# Patient Record
Sex: MALE | Race: WHITE | NOT HISPANIC OR LATINO | URBAN - METROPOLITAN AREA
[De-identification: names, ages, dates, MRNs, and addresses within clinical notes are randomized per-mention and may not be internally consistent; named-entity substitution may affect disease eponyms.]

---

## 2017-08-26 ENCOUNTER — EMERGENCY (EMERGENCY)
Facility: HOSPITAL | Age: 82
LOS: 1 days | Discharge: HOME | End: 2017-08-26
Attending: STUDENT IN AN ORGANIZED HEALTH CARE EDUCATION/TRAINING PROGRAM
Payer: MEDICARE

## 2017-08-26 VITALS
HEIGHT: 72 IN | DIASTOLIC BLOOD PRESSURE: 72 MMHG | WEIGHT: 199.96 LBS | SYSTOLIC BLOOD PRESSURE: 127 MMHG | HEART RATE: 84 BPM | TEMPERATURE: 98 F | OXYGEN SATURATION: 100 % | RESPIRATION RATE: 18 BRPM

## 2017-08-26 VITALS
OXYGEN SATURATION: 100 % | SYSTOLIC BLOOD PRESSURE: 140 MMHG | HEART RATE: 82 BPM | RESPIRATION RATE: 18 BRPM | TEMPERATURE: 98 F | DIASTOLIC BLOOD PRESSURE: 68 MMHG

## 2017-08-26 DIAGNOSIS — Z95.1 PRESENCE OF AORTOCORONARY BYPASS GRAFT: Chronic | ICD-10-CM

## 2017-08-26 PROCEDURE — 99284 EMERGENCY DEPT VISIT MOD MDM: CPT

## 2017-08-26 PROCEDURE — 96374 THER/PROPH/DIAG INJ IV PUSH: CPT

## 2017-08-26 PROCEDURE — 99284 EMERGENCY DEPT VISIT MOD MDM: CPT | Mod: 25

## 2017-08-26 RX ORDER — FAMOTIDINE 10 MG/ML
1 INJECTION INTRAVENOUS
Qty: 5 | Refills: 0 | OUTPATIENT
Start: 2017-08-26 | End: 2017-08-31

## 2017-08-26 RX ORDER — EPINEPHRINE 0.3 MG/.3ML
0.3 INJECTION INTRAMUSCULAR; SUBCUTANEOUS
Qty: 1 | Refills: 0 | OUTPATIENT
Start: 2017-08-26

## 2017-08-26 RX ORDER — DIPHENHYDRAMINE HCL 50 MG
1 CAPSULE ORAL
Qty: 16 | Refills: 0 | OUTPATIENT
Start: 2017-08-26 | End: 2017-08-30

## 2017-08-26 RX ADMIN — Medication 125 MILLIGRAM(S): at 20:43

## 2017-08-26 NOTE — ED ADULT TRIAGE NOTE - CHIEF COMPLAINT QUOTE
Stung by bee 3x to wrist and arm, pt hypotensive upon EMS arrival, pt took own benadryl 180mg which was , and was given Benadryl 50mg IV, 18g present to left AC. Received 700ml NS bolus PTA

## 2017-08-26 NOTE — ED PROVIDER NOTE - SKIN, MLM
Swelling with surround erythema to left forearm in three locations consistent with sting. no signs of infection. small area of swelling with surround erythema to left forearm in three locations consistent with sting. no signs of infection.

## 2017-08-26 NOTE — ED PROVIDER NOTE - OBJECTIVE STATEMENT
This is an 81 y/o M presenting to the ED complaining of bee sting to left forearm x 3 episodes today at 1630. He states that he was out in the yard when he disturbed a wasp nest. Pt states that he then ran into the house, put baking soda on the wound, wife gave fexofenadine and half an oxycodone. Patient reports pain was slightly relieved with oxycodone. Patient then got up to walk and then started to feel dizzy.  Since sting, pt reports swelling and pain to left forearm. EMS was then called and wife reports pt was spitting up sputum.  PCP: Dr. Melchor in [place] This is an 81 y/o M presenting to the ED complaining of sting to left forearm x 3 stings today at 1630. He states that he was out in the yard when he disturbed a wasp nest. Pt states that he then ran into the house, put baking soda on the wound, wife gave fexofenadine and half an oxycodone. Patient reports pain was slightly relieved with oxycodone. Patient then got up to walk and then started to feel dizzy. Since sting, pt reports swelling and pain to left forearm. EMS was then called and wife reports pt was spitting up sputum. At present, patient reports slight pain to LUE. Denies difficulty breathing, scratchy throat, and rash.  PCP: Dr. Melchor in [place]

## 2017-08-26 NOTE — ED ADULT NURSE NOTE - CHPI ED SYMPTOMS NEG
no nausea/no cough/no throat itching/no wheezing/no difficulty swallowing/no shortness of breath/no swelling of face, tongue/no vomiting/no difficulty breathing

## 2017-08-26 NOTE — ED ADULT NURSE NOTE - OBJECTIVE STATEMENT
Pt care assumed at 1855, presents to ED awake, alert and oriented x3 c/o allergic reaction. Pt reports he was working outside at his home and got stung by 3 wasps to his bilateral arms. Pt with redness and mild swelling to bilateral arms, warm to touch. Pt denies any throat itching or swelling. Pt denies any chest pain, sob or difficulty breathing. Pt denies any other complaints at this time, respirations even and unlabored. MD Zhong at bedside. Will continue to observe for any changes in symptoms.

## 2017-08-26 NOTE — ED PROVIDER NOTE - CHPI ED SYMPTOMS NEG
no wheezing/no difficulty swallowing/no nausea no difficulty swallowing/no difficulty breathing/no wheezing/no nausea/no rash/no throat itching

## 2017-08-26 NOTE — ED ADULT NURSE REASSESSMENT NOTE - NS ED NURSE REASSESS COMMENT FT1
Patient resting in bed, awake, alert and oriented X3, resp even/unlabored, lungs CTAB, VS stable, afebrile, denies any SOB, reports soreness to sting sites, will continue to monitor patient.

## 2018-07-29 ENCOUNTER — INPATIENT (INPATIENT)
Facility: HOSPITAL | Age: 83
LOS: 4 days | Discharge: NURSING, REHAB OR LONG-TERM CARE | DRG: 378 | End: 2018-08-03
Attending: INTERNAL MEDICINE | Admitting: GENERAL ACUTE CARE HOSPITAL
Payer: MEDICARE

## 2018-07-29 VITALS
HEART RATE: 84 BPM | WEIGHT: 154.98 LBS | RESPIRATION RATE: 18 BRPM | TEMPERATURE: 96 F | DIASTOLIC BLOOD PRESSURE: 54 MMHG | HEIGHT: 68 IN | SYSTOLIC BLOOD PRESSURE: 108 MMHG | OXYGEN SATURATION: 97 %

## 2018-07-29 DIAGNOSIS — K92.2 GASTROINTESTINAL HEMORRHAGE, UNSPECIFIED: ICD-10-CM

## 2018-07-29 DIAGNOSIS — Z95.1 PRESENCE OF AORTOCORONARY BYPASS GRAFT: Chronic | ICD-10-CM

## 2018-07-29 LAB
ALBUMIN SERPL ELPH-MCNC: 3.4 G/DL — SIGNIFICANT CHANGE UP (ref 3.3–5.2)
ALP SERPL-CCNC: 83 U/L — SIGNIFICANT CHANGE UP (ref 40–120)
ALT FLD-CCNC: 25 U/L — SIGNIFICANT CHANGE UP
ANION GAP SERPL CALC-SCNC: 15 MMOL/L — SIGNIFICANT CHANGE UP (ref 5–17)
ANISOCYTOSIS BLD QL: SLIGHT — SIGNIFICANT CHANGE UP
APTT BLD: 36.7 SEC — SIGNIFICANT CHANGE UP (ref 27.5–37.4)
AST SERPL-CCNC: 39 U/L — SIGNIFICANT CHANGE UP
BASOPHILS # BLD AUTO: 0 K/UL — SIGNIFICANT CHANGE UP (ref 0–0.2)
BASOPHILS # BLD AUTO: 0 K/UL — SIGNIFICANT CHANGE UP (ref 0–0.2)
BASOPHILS NFR BLD AUTO: 0 % — SIGNIFICANT CHANGE UP (ref 0–2)
BASOPHILS NFR BLD AUTO: 0.1 % — SIGNIFICANT CHANGE UP (ref 0–2)
BILIRUB SERPL-MCNC: 1.2 MG/DL — SIGNIFICANT CHANGE UP (ref 0.4–2)
BLD GP AB SCN SERPL QL: SIGNIFICANT CHANGE UP
BUN SERPL-MCNC: 61 MG/DL — HIGH (ref 8–20)
CALCIUM SERPL-MCNC: 8.8 MG/DL — SIGNIFICANT CHANGE UP (ref 8.6–10.2)
CHLORIDE SERPL-SCNC: 93 MMOL/L — LOW (ref 98–107)
CO2 SERPL-SCNC: 21 MMOL/L — LOW (ref 22–29)
CREAT SERPL-MCNC: 1.52 MG/DL — HIGH (ref 0.5–1.3)
EOSINOPHIL # BLD AUTO: 0 K/UL — SIGNIFICANT CHANGE UP (ref 0–0.5)
EOSINOPHIL # BLD AUTO: 0 K/UL — SIGNIFICANT CHANGE UP (ref 0–0.5)
EOSINOPHIL NFR BLD AUTO: 0 % — SIGNIFICANT CHANGE UP (ref 0–5)
EOSINOPHIL NFR BLD AUTO: 0 % — SIGNIFICANT CHANGE UP (ref 0–6)
GLUCOSE SERPL-MCNC: 138 MG/DL — HIGH (ref 70–115)
HCT VFR BLD CALC: 27.2 % — LOW (ref 42–52)
HCT VFR BLD CALC: 27.5 % — LOW (ref 42–52)
HCT VFR BLD CALC: 27.7 % — LOW (ref 42–52)
HCT VFR BLD CALC: 30.9 % — LOW (ref 42–52)
HGB BLD-MCNC: 10.4 G/DL — LOW (ref 14–18)
HGB BLD-MCNC: 9 G/DL — LOW (ref 14–18)
HGB BLD-MCNC: 9.3 G/DL — LOW (ref 14–18)
HGB BLD-MCNC: 9.4 G/DL — LOW (ref 14–18)
INR BLD: 3.28 RATIO — HIGH (ref 0.88–1.16)
LYMPHOCYTES # BLD AUTO: 0.9 K/UL — LOW (ref 1–4.8)
LYMPHOCYTES # BLD AUTO: 1 K/UL — SIGNIFICANT CHANGE UP (ref 1–4.8)
LYMPHOCYTES # BLD AUTO: 5 % — LOW (ref 20–55)
LYMPHOCYTES # BLD AUTO: 8.2 % — LOW (ref 20–55)
MACROCYTES BLD QL: SLIGHT — SIGNIFICANT CHANGE UP
MCHC RBC-ENTMCNC: 27.4 PG — SIGNIFICANT CHANGE UP (ref 27–31)
MCHC RBC-ENTMCNC: 28.1 PG — SIGNIFICANT CHANGE UP (ref 27–31)
MCHC RBC-ENTMCNC: 28.3 PG — SIGNIFICANT CHANGE UP (ref 27–31)
MCHC RBC-ENTMCNC: 33.1 G/DL — SIGNIFICANT CHANGE UP (ref 32–36)
MCHC RBC-ENTMCNC: 33.7 G/DL — SIGNIFICANT CHANGE UP (ref 32–36)
MCHC RBC-ENTMCNC: 33.9 G/DL — SIGNIFICANT CHANGE UP (ref 32–36)
MCV RBC AUTO: 82.9 FL — SIGNIFICANT CHANGE UP (ref 80–94)
MCV RBC AUTO: 83.4 FL — SIGNIFICANT CHANGE UP (ref 80–94)
MCV RBC AUTO: 83.5 FL — SIGNIFICANT CHANGE UP (ref 80–94)
MICROCYTES BLD QL: SLIGHT — SIGNIFICANT CHANGE UP
MONOCYTES # BLD AUTO: 0.9 K/UL — HIGH (ref 0–0.8)
MONOCYTES # BLD AUTO: 1.2 K/UL — HIGH (ref 0–0.8)
MONOCYTES NFR BLD AUTO: 6 % — SIGNIFICANT CHANGE UP (ref 3–10)
MONOCYTES NFR BLD AUTO: 7.5 % — SIGNIFICANT CHANGE UP (ref 3–10)
NEUTROPHILS # BLD AUTO: 10.5 K/UL — HIGH (ref 1.8–8)
NEUTROPHILS # BLD AUTO: 11.8 K/UL — HIGH (ref 1.8–8)
NEUTROPHILS NFR BLD AUTO: 83.9 % — HIGH (ref 37–73)
NEUTROPHILS NFR BLD AUTO: 89 % — HIGH (ref 37–73)
OB PNL STL: POSITIVE
OVALOCYTES BLD QL SMEAR: SLIGHT — SIGNIFICANT CHANGE UP
PLAT MORPH BLD: ABNORMAL
PLATELET # BLD AUTO: 204 K/UL — SIGNIFICANT CHANGE UP (ref 150–400)
PLATELET # BLD AUTO: 217 K/UL — SIGNIFICANT CHANGE UP (ref 150–400)
PLATELET # BLD AUTO: 232 K/UL — SIGNIFICANT CHANGE UP (ref 150–400)
PLATELET CLUMP BLD QL SMEAR: SIGNIFICANT CHANGE UP
POIKILOCYTOSIS BLD QL AUTO: SLIGHT — SIGNIFICANT CHANGE UP
POTASSIUM SERPL-MCNC: 5 MMOL/L — SIGNIFICANT CHANGE UP (ref 3.5–5.3)
POTASSIUM SERPL-SCNC: 5 MMOL/L — SIGNIFICANT CHANGE UP (ref 3.5–5.3)
PROT SERPL-MCNC: 7.3 G/DL — SIGNIFICANT CHANGE UP (ref 6.6–8.7)
PROTHROM AB SERPL-ACNC: 37 SEC — HIGH (ref 9.8–12.7)
RBC # BLD: 3.28 M/UL — LOW (ref 4.6–6.2)
RBC # BLD: 3.32 M/UL — LOW (ref 4.6–6.2)
RBC # BLD: 3.7 M/UL — LOW (ref 4.6–6.2)
RBC # FLD: 18.1 % — HIGH (ref 11–15.6)
RBC # FLD: 18.2 % — HIGH (ref 11–15.6)
RBC # FLD: 18.4 % — HIGH (ref 11–15.6)
RBC BLD AUTO: ABNORMAL
SCHISTOCYTES BLD QL AUTO: SLIGHT — SIGNIFICANT CHANGE UP
SODIUM SERPL-SCNC: 129 MMOL/L — LOW (ref 135–145)
TYPE + AB SCN PNL BLD: SIGNIFICANT CHANGE UP
WBC # BLD: 12.6 K/UL — HIGH (ref 4.8–10.8)
WBC # BLD: 13.3 K/UL — HIGH (ref 4.8–10.8)
WBC # BLD: 14 K/UL — HIGH (ref 4.8–10.8)
WBC # FLD AUTO: 12.6 K/UL — HIGH (ref 4.8–10.8)
WBC # FLD AUTO: 13.3 K/UL — HIGH (ref 4.8–10.8)
WBC # FLD AUTO: 14 K/UL — HIGH (ref 4.8–10.8)

## 2018-07-29 PROCEDURE — 99223 1ST HOSP IP/OBS HIGH 75: CPT

## 2018-07-29 PROCEDURE — 93010 ELECTROCARDIOGRAM REPORT: CPT

## 2018-07-29 PROCEDURE — 99285 EMERGENCY DEPT VISIT HI MDM: CPT

## 2018-07-29 PROCEDURE — 70450 CT HEAD/BRAIN W/O DYE: CPT | Mod: 26

## 2018-07-29 PROCEDURE — 74174 CTA ABD&PLVS W/CONTRAST: CPT | Mod: 26

## 2018-07-29 RX ORDER — INSULIN LISPRO 100/ML
VIAL (ML) SUBCUTANEOUS
Qty: 0 | Refills: 0 | Status: DISCONTINUED | OUTPATIENT
Start: 2018-07-29 | End: 2018-08-03

## 2018-07-29 RX ORDER — DEXTROSE 50 % IN WATER 50 %
25 SYRINGE (ML) INTRAVENOUS ONCE
Qty: 0 | Refills: 0 | Status: DISCONTINUED | OUTPATIENT
Start: 2018-07-29 | End: 2018-08-03

## 2018-07-29 RX ORDER — LISINOPRIL 2.5 MG/1
2.5 TABLET ORAL DAILY
Qty: 0 | Refills: 0 | Status: DISCONTINUED | OUTPATIENT
Start: 2018-07-29 | End: 2018-07-29

## 2018-07-29 RX ORDER — SODIUM CHLORIDE 9 MG/ML
1000 INJECTION INTRAMUSCULAR; INTRAVENOUS; SUBCUTANEOUS
Qty: 0 | Refills: 0 | Status: DISCONTINUED | OUTPATIENT
Start: 2018-07-29 | End: 2018-07-31

## 2018-07-29 RX ORDER — DEXTROSE 50 % IN WATER 50 %
15 SYRINGE (ML) INTRAVENOUS ONCE
Qty: 0 | Refills: 0 | Status: DISCONTINUED | OUTPATIENT
Start: 2018-07-29 | End: 2018-08-03

## 2018-07-29 RX ORDER — METOPROLOL TARTRATE 50 MG
25 TABLET ORAL
Qty: 0 | Refills: 0 | Status: DISCONTINUED | OUTPATIENT
Start: 2018-07-29 | End: 2018-08-03

## 2018-07-29 RX ORDER — SODIUM CHLORIDE 9 MG/ML
1000 INJECTION, SOLUTION INTRAVENOUS
Qty: 0 | Refills: 0 | Status: DISCONTINUED | OUTPATIENT
Start: 2018-07-29 | End: 2018-08-03

## 2018-07-29 RX ORDER — WARFARIN SODIUM 2.5 MG/1
4 TABLET ORAL ONCE
Qty: 0 | Refills: 0 | Status: DISCONTINUED | OUTPATIENT
Start: 2018-07-29 | End: 2018-07-29

## 2018-07-29 RX ORDER — DEXTROSE 50 % IN WATER 50 %
12.5 SYRINGE (ML) INTRAVENOUS ONCE
Qty: 0 | Refills: 0 | Status: DISCONTINUED | OUTPATIENT
Start: 2018-07-29 | End: 2018-08-03

## 2018-07-29 RX ORDER — ALLOPURINOL 300 MG
75 TABLET ORAL DAILY
Qty: 0 | Refills: 0 | Status: DISCONTINUED | OUTPATIENT
Start: 2018-07-29 | End: 2018-07-30

## 2018-07-29 RX ORDER — SODIUM CHLORIDE 9 MG/ML
500 INJECTION, SOLUTION INTRAVENOUS ONCE
Qty: 0 | Refills: 0 | Status: COMPLETED | OUTPATIENT
Start: 2018-07-29 | End: 2018-07-29

## 2018-07-29 RX ORDER — ACETAMINOPHEN 500 MG
650 TABLET ORAL EVERY 6 HOURS
Qty: 0 | Refills: 0 | Status: DISCONTINUED | OUTPATIENT
Start: 2018-07-29 | End: 2018-08-03

## 2018-07-29 RX ORDER — ATORVASTATIN CALCIUM 80 MG/1
40 TABLET, FILM COATED ORAL AT BEDTIME
Qty: 0 | Refills: 0 | Status: DISCONTINUED | OUTPATIENT
Start: 2018-07-29 | End: 2018-08-03

## 2018-07-29 RX ORDER — GLUCAGON INJECTION, SOLUTION 0.5 MG/.1ML
1 INJECTION, SOLUTION SUBCUTANEOUS ONCE
Qty: 0 | Refills: 0 | Status: DISCONTINUED | OUTPATIENT
Start: 2018-07-29 | End: 2018-08-03

## 2018-07-29 RX ORDER — PANTOPRAZOLE SODIUM 20 MG/1
40 TABLET, DELAYED RELEASE ORAL
Qty: 0 | Refills: 0 | Status: DISCONTINUED | OUTPATIENT
Start: 2018-07-29 | End: 2018-08-02

## 2018-07-29 RX ADMIN — SODIUM CHLORIDE 500 MILLILITER(S): 9 INJECTION, SOLUTION INTRAVENOUS at 13:43

## 2018-07-29 RX ADMIN — PANTOPRAZOLE SODIUM 40 MILLIGRAM(S): 20 TABLET, DELAYED RELEASE ORAL at 21:26

## 2018-07-29 RX ADMIN — SODIUM CHLORIDE 75 MILLILITER(S): 9 INJECTION INTRAMUSCULAR; INTRAVENOUS; SUBCUTANEOUS at 21:27

## 2018-07-29 RX ADMIN — Medication 75 MILLIGRAM(S): at 21:26

## 2018-07-29 RX ADMIN — ATORVASTATIN CALCIUM 40 MILLIGRAM(S): 80 TABLET, FILM COATED ORAL at 21:26

## 2018-07-29 RX ADMIN — SODIUM CHLORIDE 1500 MILLILITER(S): 9 INJECTION, SOLUTION INTRAVENOUS at 12:57

## 2018-07-29 RX ADMIN — Medication 25 MILLIGRAM(S): at 21:26

## 2018-07-29 NOTE — ED PROVIDER NOTE - GASTROINTESTINAL, MLM
Abdomen soft, non-tender, no guarding.  Rectal:  External hemorrhoid, nonbleeding.  Brown stool with bloody streaking

## 2018-07-29 NOTE — ED PROVIDER NOTE - OBJECTIVE STATEMENT
82 y/o M, with hx of DM, mitral valve replacement, packemaker, and CABG, presents to the ED c/o rectal bleeding, onset this morning.  As per wife, pt has been passing large amounts of blood from his rectum.  Wife notes bright red blood, with some small clots, but states that pt's stool is dark brown, not black.  Wife also states that pt has had diarrhea for the past 6 weeks.  Pt denies a colonoscopy 2 weeks ago.  Pt currently takes 4mg of Warfarin.  Pt recently stopped Warfarin 5 days ago due to complications and started again yesterday on a lower dose.  Denies hx of GI bleed.  Wife also states that pt appears to be losing weight.  Denies hx of abd surgery.  Wife also states that pt recently had a CT and was diagnosed with mild emphysema and a small node on his right lung.  Wife also notes recent urinary incontinence.  Cardiologist: Dr. Ewing.  Denies f/c/n/v/cp/sob/palpitations/ cough/rash/headache/dizziness/abd.pain/c/dysuria/hematuria 82 y/o M, with hx of DM, mitral valve replacement, packemaker, and CABG, presents to the ED c/o rectal bleeding, onset this morning.  As per wife, pt has been passing large amounts of blood from his rectum.  Wife notes bright red blood, with some small clots, but states that pt's stool is dark brown, not black.  Wife also states that pt has had diarrhea for the past 6 weeks.  Pt declined a colonoscopy 2 weeks ago.  Pt currently takes 4mg of Warfarin.  Pt recently stopped Warfarin 5 days ago due to complications INR 6 at that time and started again yesterday on a lower dose of 4mg.  Denies hx of GI bleed.  Wife also states that pt appears to be losing weight and getting more weak.  Denies hx of abd surgery.  Wife also states that pt recently had a CT and was diagnosed with mild emphysema and a small node on his right lung.  Wife also notes recent urinary incontinence.  Cardiologist: Dr. Ewing.  Denies f/c/n/v/cp/sob/palpitations/ cough/rash/headache/dizziness/abd.pain/c/dysuria/hematuria

## 2018-07-29 NOTE — ED PROVIDER NOTE - NS ED ROS FT
Denies f/c/n/v/cp/sob/palpitations/ cough/rash/headache/dizziness/abd.pain/c/dysuria/hematuria   +GENERALIZED WEAKNESS, RECTAL BLEEDING, DIARRHEA/ WEIGHT LOSS Denies f/c/n/v/cp/sob/palpitations/ cough/rash/headache/dizziness/abd.pain/c/dysuria/hematuria   +GENERALIZED WEAKNESS, RECTAL BLEEDING, DIARRHEA/ WEIGHT LOSS/URINARY INCONTINENCE

## 2018-07-29 NOTE — CONSULT NOTE ADULT - ATTENDING COMMENTS
as above    # No VTE on therapeutic coumadin    # social work re discharge planning as wife cannot manage aone    # will need PT evaluation once GIB stable as above    # No VTE on therapeutic coumadin    # social work re discharge planning as wife cannot manage aone    # will need PT evaluation once GIB stable    # med rec and inpatient certification done as above    # No VTE on therapeutic coumadin    # social work re discharge planning as wife cannot manage alone    # will need PT evaluation once GIB stable    # med rec and inpatient certification done

## 2018-07-29 NOTE — H&P ADULT - ASSESSMENT
Patient is a 83 yr old male with PMH of CAD s/p CABG, DM II, s/p MVR on coumadin, and s/p PPM who presented to [facility] ED with complaints of bright red blood per rectum since yesterday evening.    1. Lower GI bleed  -admit to monitored bed with   -patient with 6 week history of diarrhea and now with BRBPR  -Hb 10.4 --> 9.0  -Occult blood positive   -CTA negative   -Protonix IV BID  -Hold Coumadin   -Clear liquid diet  -No active bleeding or abdominal pain in the ED; patient currently hemodynamically stable  -Type and Screen & Serial H/H  -Judicious hydration  -GI consulted    2. 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 MVR  -on Coumadin but therapeutic INR  -hold Coumadin and check INR in AM    4. DM  -check HbA1c  -hold oral agents  -ISS    5. CAD s/p CABG  -asymptomatic  -check TNI and EKG  -not on ASA or plavix  -continue metoprolol and statin    6. Leukocytosis  -likely reactive  -monitor temp curve and WBC count  -check UA, CXR  -blood cultures if febrile    7. Hyponatremia  -likely secondary to poor solute intake and hypovolemia  -check serum osm, urine osm and lytes  -check orthostatics  -trial if NS  -strict I/Os, daily weights  -repeat BMP in AM    8. Forgetfulness  -likely secondary to dementia  -needs formal cognitive function testing as outpatient  -check B12, TSH, RPR  -CT head - negative for acute pathology    DVT prophylaxis - SCDs Patient is a 83 yr old male with PMH of CAD s/p CABG, DM II, s/p MVR on coumadin, and s/p PPM who presented to [facility] ED with complaints of bright red blood per rectum since yesterday evening.    1. Lower GI bleed  -admit to monitored bed with   -patient with 6 week history of diarrhea and now with BRBPR  -Hb 10.4 --> 9.0  -Occult blood positive   -CTA negative   -Protonix IV BID  -Hold Coumadin   -Clear liquid diet  -No active bleeding or abdominal pain in the ED; patient currently hemodynamically stable  -Type and Screen & Serial H/H  -Judicious hydration  -GI consulted    2. 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 MVR  -on Coumadin but therapeutic INR  -hold Coumadin and check INR in AM    4. DM  -check HbA1c  -hold oral agents  -ISS    5. CAD s/p CABG  -asymptomatic  -check TNI and EKG  -not on ASA or plavix  -continue metoprolol and statin    6. Leukocytosis  -likely reactive  -monitor temp curve and WBC count  -check UA, CXR  -blood cultures if febrile    7. Hyponatremia  -likely secondary to poor solute intake and hypovolemia  -check serum osm, urine osm and lytes  -check orthostatics  -trial if NS  -strict I/Os, daily weights  -repeat BMP in AM    8. Forgetfulness  -likely secondary to dementia  -needs formal cognitive function testing as outpatient  -check B12, TSH, RPR  -CT head - negative for acute pathology    9. Chronic pain syndrome  -continue lyrica  -eventual PT evaluation    DVT prophylaxis - SCDs

## 2018-07-29 NOTE — CONSULT NOTE ADULT - SUBJECTIVE AND OBJECTIVE BOX
Wife contributed more to the story than pt    83 yr old male w CAD s/p CABG, DM II, Diarrhea,  s/p MVR on coumadin, PPM who presented to [facility] ED w bright red blood per rectum and recent fall       Wife indicated that pt had 6 weeks of diarrhea that was worked up w CTA as outpt; no reason seen for diarrhea; refused colonoscopy;  last study >10 yrs ago  Suddenly last night and this AM, pt experienced nausea and had multiple BMs w stool and sometimes just blood.  She reported that blood was bright.  Patient had N, no vomiting.  Used 5 rolls of toilet paper trying to clean up from passing the blood.  No real clots seen.  She insisted on the ED  Last had INR 07-24 INR 8;  help coumadin x 3 days then decreased dose from 5 mg to 4 mg as instructed Weakness to the point she cannot get him up when he falls. Memory is "getting forgetful".     In ED, stool brown w guaiac +.  Hb 10.5 then 9 then CTA done; no source for bleeding   Last fell on o7-27 w/o LOC so ED MD ordered head CT- results pending    PCP Dr. Santino Ewing  GI Dr. Mohamud?         PAST MED HX  Anaphylaxis to wasps  CAD s/p CABG   DM II  Diarrhea x 6 weeks CTA outpt neg  Hyperlipidemia HLD  Lung nodule (3 cm seen on CT) w mild COPD  Mental confusion  s/p MVR on coumadin  s/p PPM    PAST SURG HX  CABG 1996  PPM 10 yrs ago  MVR 5 yrs ago    FAM HX  Mother w DM  no hx CAD, HTN, ulcers, colon CA    SOCIAL HX    lives w wife  nonsmoker      ROS  GEN no fever or chills; no travel hx or sick contacts   + intermittent dizziness and weakness  + 8 lb weight loss  HEENT + hit his head w/o LOC on 07-27  RESP no cough or SOB  CARD no palpitations, no chest pain  GI + nausea no vomiting, no abd pain + diarrhea x 6 weeks ,   now blood per rectum; had refused routine colonoscopy and more recent request for study 2 weeks ago  MSK some intermittent joint pain  NEURO  developing more forgetfulness    PHYSiCAL        Tele-evaluation precludes physical exam. Pertinent physical exam findings  Pt appear frail, appears pale, comfortable on stretcher wearing NC/supplemental oxygen, in NAD  Vital Signs Last 24 Hrs  T(C): 36.4 (29 Jul 2018 15:59), Max: 36.7 (29 Jul 2018 11:13)  T(F): 97.5 (29 Jul 2018 15:59), Max: 98 (29 Jul 2018 11:13)  HR: 65 (29 Jul 2018 15:59) (65 - 88)  BP: 110/63 (29 Jul 2018 15:59) (108/54 - 110/63)  BP(mean): --  RR: 18 (29 Jul 2018 15:59) (18 - 18)  SpO2: 94% (29 Jul 2018 15:59) (94% - 98%)        LABS AND IMAGING DATA:                        9.0    12.6  )-----------( 204      ( 29 Jul 2018 14:28 )     down from 10.5             27.2     07-29    129<L>  |  93<L>  |  61.0<H>  ----------------------------<  138<H>  5.0   |  21.0<L>  |  1.52<H>    Ca    8.8      29 Jul 2018 09:37    TPro  7.3  /  Alb  3.4  /  TBili  1.2  /  DBili  x   /  AST  39  /  ALT  25  /  AlkPhos  83  07-       CT ANGIO ABD PELV (W)AW IC                        PROCEDURE DATE:  07/29/2018    INTERPRETATION:  CLINICAL INFORMATION: Bright red blood per rectum.  FINDINGS:    LOWER CHEST: Cardiac valve repair. Partially imaged pacing wires.    LIVER: Within normal limits.  BILE DUCTS: Normal caliber.   GALLBLADDER: Within normal limits.  SPLEEN: Within normal limits.  PANCREAS: Within normal limits.  ADRENALS: Within normal limits.  KIDNEYS/URETERS: Within normal limits.     BLADDER: Within normal limits.  REPRODUCTIVE ORGANS: The prostate is enlarged.    BOWEL: No bowel obstruction. Normal appendix. No active contrast   extravasation to suggest a source of active GI bleed.   PERITONEUM: No ascites.  VESSELS:  Atherosclerotic change of the abdominal aorta and its branches.  RETROPERITONEUM: No lymphadenopathy.    ABDOMINAL WALL: Within normal limits.  BONES: Degenerative changes of the spine. Sternotomy.    IMPRESSION: No active contrast extravasation to suggest a source of   active GI bleed. Wife contributed more to the story than pt    83 yr old male w CAD s/p CABG, DM II, Diarrhea,  s/p MVR on coumadin, PPM who presented to [facility] ED w bright red blood per rectum and recent fall       Wife indicated that pt had 6 weeks of diarrhea that was worked up w CTscan as outpt; no reason seen for diarrhea; refused colonoscopy;  last study >10 yrs ago  Suddenly last night and this AM, pt experienced nausea and had multiple BMs w stool and sometimes just blood.  She reported that blood was bright.  Patient had N, no vomiting.  Used 5 rolls of toilet paper trying to clean up from passing the blood.  No real clots seen.  She insisted on the ED  Last had INR 07-24 INR 8;  help coumadin x 3 days then decreased dose from 5 mg to 4 mg as instructed Weakness to the point she cannot get him up when he falls. Memory is "getting forgetful".     In ED, stool brown w guaiac +.  Hb 10.5 then 9 then CTA done; no source for bleeding   Last fell on o7-27 w/o LOC so ED MD ordered head CT- results pending    PCP Dr. Santino Ewing  GI Dr. Mohamud?         PAST MED HX  Anaphylaxis to wasps  CAD s/p CABG   DM II  Diarrhea x 6 weeks CTA outpt neg  Hyperlipidemia HLD  Lung nodule (3 cm seen on CT) w mild COPD  Mental confusion  s/p MVR on coumadin  s/p PPM    PAST SURG HX  CABG 1996  PPM 10 yrs ago  MVR 5 yrs ago    FAM HX  Mother w DM  no hx CAD, HTN, ulcers, colon CA    SOCIAL HX    lives w wife  nonsmoker      ROS  GEN no fever or chills; no travel hx or sick contacts   + intermittent dizziness and weakness  + 8 lb weight loss  HEENT + hit his head w/o LOC on 07-27  RESP no cough or SOB  CARD no palpitations, no chest pain  GI + nausea no vomiting, no abd pain + diarrhea x 6 weeks ,   now blood per rectum; had refused routine colonoscopy and more recent request for study 2 weeks ago  MSK some intermittent joint pain  NEURO  developing more forgetfulness    PHYSiCAL        Tele-evaluation precludes physical exam. Pertinent physical exam findings  Pt appear frail, appears pale, comfortable on stretcher wearing NC/supplemental oxygen, in NAD  Vital Signs Last 24 Hrs  T(C): 36.4 (29 Jul 2018 15:59), Max: 36.7 (29 Jul 2018 11:13)  T(F): 97.5 (29 Jul 2018 15:59), Max: 98 (29 Jul 2018 11:13)  HR: 65 (29 Jul 2018 15:59) (65 - 88)  BP: 110/63 (29 Jul 2018 15:59) (108/54 - 110/63)  BP(mean): --  RR: 18 (29 Jul 2018 15:59) (18 - 18)  SpO2: 94% (29 Jul 2018 15:59) (94% - 98%)        LABS AND IMAGING DATA:                        9.0    12.6  )-----------( 204      ( 29 Jul 2018 14:28 )     down from 10.5             27.2     07-29    129<L>  |  93<L>  |  61.0<H>  ----------------------------<  138<H>  5.0   |  21.0<L>  |  1.52<H>    Ca    8.8      29 Jul 2018 09:37    TPro  7.3  /  Alb  3.4  /  TBili  1.2  /  DBili  x   /  AST  39  /  ALT  25  /  AlkPhos  83  07-       CT ANGIO ABD PELV (W)AW IC                        PROCEDURE DATE:  07/29/2018    INTERPRETATION:  CLINICAL INFORMATION: Bright red blood per rectum.  FINDINGS:    LOWER CHEST: Cardiac valve repair. Partially imaged pacing wires.    LIVER: Within normal limits.  BILE DUCTS: Normal caliber.   GALLBLADDER: Within normal limits.  SPLEEN: Within normal limits.  PANCREAS: Within normal limits.  ADRENALS: Within normal limits.  KIDNEYS/URETERS: Within normal limits.     BLADDER: Within normal limits.  REPRODUCTIVE ORGANS: The prostate is enlarged.    BOWEL: No bowel obstruction. Normal appendix. No active contrast   extravasation to suggest a source of active GI bleed.   PERITONEUM: No ascites.  VESSELS:  Atherosclerotic change of the abdominal aorta and its branches.  RETROPERITONEUM: No lymphadenopathy.    ABDOMINAL WALL: Within normal limits.  BONES: Degenerative changes of the spine. Sternotomy.    IMPRESSION: No active contrast extravasation to suggest a source of   active GI bleed.      HEAD CT NON-CONTRAST FINDINGS:     There is no evidence of mass or acute intracranial hemorrhage. Chronic   left frontal lobe infarct with associated ex vacuo dilatation of the   frontal horn of the left lateral ventricle. Ventricles and sulci are   otherwise normal in size and configuration for the patient's stated age.   No midline shift or other significant mass effect is noted. There is no   CT evidence of acute territorial infarct.     The visualized paranasal sinuses and tympanomastoid spaces are clear.   Orbits and orbital contents are unremarkable.    There is no depressed calvarial fracture.    IMPRESSION:     No evidence of acute intracranial hemorrhage, midline shift or CT   evidence of acute territorial infarct.    If the patient's symptoms persist, consider short interval follow-up head   CT or brain MRI if there are no MRI contraindications.

## 2018-07-29 NOTE — ED ADULT TRIAGE NOTE - CHIEF COMPLAINT QUOTE
Patient is awake ad oriented times 4, complains of feeling like he has to have a bowel movement and not passing much stool, patient has had some stool and some bright red blood, patient reports the urgency to have a bm in triage

## 2018-07-29 NOTE — CONSULT NOTE ADULT - ASSESSMENT
83 yr old male w CAD s/p CABG, DM II, Diarrhea,  s/p MVR on coumadin, PPM who presented to [facility] ED w bright red blood per rectum and recent fall    1) GI bleed  -reported as bright red blood + recent prolonged diarrhea  -progressive weakness and weight loss  -CT angio no significant findings/bleeding site  no pain  no added BPR in ED  1. admit to telemetry  2. CBC tonight and in AM  3. clears  4. protonix 40 mg IV q 12  5. to consider GI consult  6. bedrest  7. continue pulse ox  8. continue supplemental O2    2) recent fall s/o injury to head  CT pending       3)s/p MVR on coumadin  mechanical valve reported  1. continue coumadin 4 mg  2. repeat PT/INR in AM  3. Monitor for rectal bleeding and or abd or chest pain  4. telemetry  5. EKG    4) CAD s/p CABG, PPM  1. continue home meds  metoprolol  pravastatin 40 mg  lisinopril 2.5  2. check ekg    5) Chronic pain  1. continue lyrica    6) Gout  continue allopurinol 75 mg 83 yr old male w CAD s/p CABG, DM II, Diarrhea,  s/p MVR on coumadin, PPM who presented to [facility] ED w bright red blood per rectum and recent fall    1) GI bleed  -reported as bright red blood + recent prolonged diarrhea  -progressive weakness and weight loss  -CT angio no significant findings/bleeding site  no pain  no added BPR in ED  1. admit to telemetry  2. CBC tonight and in AM  3. clears  4. protonix 40 mg IV q 12  5. to consider GI consult  6. bedrest  7. continue pulse ox  8. continue supplemental O2    2) recent fall s/o injury to head  CT pending       3)s/p MVR on coumadin  mechanical valve reported  1. continue coumadin 4 mg  2. repeat PT/INR in AM  3. Monitor for rectal bleeding and or abd or chest pain  4. telemetry  5. EKG    4) CAD s/p CABG, PPM  1. continue home meds  metoprolol  pravastatin 40 mg  lisinopril 2.5  2. check ekg    5) Chronic pain  1. continue lyrica    6) DM II  1. hold metformin  2. clear po  3. BGM  4. ISS only for now    7) Gout  continue allopurinol 75 mg 83 yr old male w CAD s/p CABG, DM II, Diarrhea,  s/p MVR on coumadin, PPM who presented to [facility] ED w bright red blood per rectum and recent fall    1) GI bleed  -reported as bright red blood + recent prolonged diarrhea  -progressive weakness and weight loss  -CT angio no significant findings/bleeding site  no pain  no added BPR in ED  1. admit to telemetry  2. CBC tonight and in AM  3. clears  4. protonix 40 mg IV q 12  5. to consider GI consult  6. bedrest  7. continue pulse ox  8. continue supplemental O2    2) recent fall s/o injury to head  CT no acute change       3)s/p MVR on coumadin  mechanical valve reported  1. continue coumadin 4 mg  2. repeat PT/INR in AM  3. Monitor for rectal bleeding and or abd or chest pain  4. telemetry  5. EKG    4) CAD s/p CABG, PPM  1. continue home meds  metoprolol  pravastatin 40 mg  lisinopril 2.5  2. check ekg    5) Chronic pain  1. continue lyrica    6) DM II  1. hold metformin  2. clear po  3. BGM  4. ISS only for now    7) Gout  continue allopurinol 75 mg

## 2018-07-29 NOTE — H&P ADULT - HISTORY OF PRESENT ILLNESS
Patient is a 83 yr old male with PMH of CAD s/p CABG, DM II, s/p MVR on coumadin, and s/p PPM who presented to [facility] ED with complaints of bright red blood per rectum since yesterday evening. Patient is a poor historian and likely has dementia, therefore most information was obtained from the patient's wife. Patient recently has been having ongoing diarrhea (non-bloody) for approximately 6 weeks for which he had an outpatient CT scan that did not reveal any specific etiology of the diarrhea. Patient had refused an outpatient colonoscopy (last study > 10 years ago). As per patient's wife, patient had multiple episodes of bloody bowel movements (formed) and nausea yesterday evening and this morning. Patient described the blood as bright red. Denies associated vomiting or abdominal pain. Recently, patient also sustained a fall and hit the left side of his head. Patient denied any prodromal symptoms or LOC at that time. On further evaluation, patient recently had his Coumadin dose decreased after having a supratherapeutic INR. Specifically, his last INR on 7/24 was 8 and he was instructed to hold his Coumadin for 3 days. In the ED, patient had a positive occult blood. Initial Hb was 10.5 and repeat was 9. Vitals remained stable with no evidence of bleeding in the ED. CTA was done which did not reveal a source of bleeding.  Patient is hemodynamically stable and currently denies chest pain, SOB, nausea, vomiting, abdominal pain, dizziness or lightheadedness.

## 2018-07-29 NOTE — INPATIENT CERTIFICATION FOR MEDICARE PATIENTS - RISKS OF ADVERSE EVENTS
Concern for neurologic deterioration/Concern for renal deterioration/Other:/Concern for delay in diagnosis and treatment/risk of hemorrhaging/Concern for cardiopulmonary deterioration Other:/Concern for cardiopulmonary deterioration/Concern for neurologic deterioration/bleeding/Concern for delay in diagnosis and treatment/Concern for renal deterioration

## 2018-07-30 DIAGNOSIS — K92.2 GASTROINTESTINAL HEMORRHAGE, UNSPECIFIED: ICD-10-CM

## 2018-07-30 LAB
ANION GAP SERPL CALC-SCNC: 11 MMOL/L — SIGNIFICANT CHANGE UP (ref 5–17)
APPEARANCE UR: CLEAR — SIGNIFICANT CHANGE UP
BASOPHILS # BLD AUTO: 0 K/UL — SIGNIFICANT CHANGE UP (ref 0–0.2)
BASOPHILS NFR BLD AUTO: 0.1 % — SIGNIFICANT CHANGE UP (ref 0–2)
BILIRUB UR-MCNC: NEGATIVE — SIGNIFICANT CHANGE UP
BUN SERPL-MCNC: 40 MG/DL — HIGH (ref 8–20)
CALCIUM SERPL-MCNC: 8.3 MG/DL — LOW (ref 8.6–10.2)
CHLORIDE SERPL-SCNC: 99 MMOL/L — SIGNIFICANT CHANGE UP (ref 98–107)
CO2 SERPL-SCNC: 23 MMOL/L — SIGNIFICANT CHANGE UP (ref 22–29)
COLOR SPEC: YELLOW — SIGNIFICANT CHANGE UP
CORTIS AM PEAK SERPL-MCNC: 16.6 UG/DL — SIGNIFICANT CHANGE UP (ref 6–18.4)
CREAT ?TM UR-MCNC: 77 MG/DL — SIGNIFICANT CHANGE UP
CREAT SERPL-MCNC: 1.19 MG/DL — SIGNIFICANT CHANGE UP (ref 0.5–1.3)
DIFF PNL FLD: ABNORMAL
EOSINOPHIL # BLD AUTO: 0 K/UL — SIGNIFICANT CHANGE UP (ref 0–0.5)
EOSINOPHIL NFR BLD AUTO: 0.1 % — SIGNIFICANT CHANGE UP (ref 0–5)
EPI CELLS # UR: SIGNIFICANT CHANGE UP
GLUCOSE BLDC GLUCOMTR-MCNC: 132 MG/DL — HIGH (ref 70–99)
GLUCOSE BLDC GLUCOMTR-MCNC: 135 MG/DL — HIGH (ref 70–99)
GLUCOSE BLDC GLUCOMTR-MCNC: 328 MG/DL — HIGH (ref 70–99)
GLUCOSE SERPL-MCNC: 129 MG/DL — HIGH (ref 70–115)
GLUCOSE UR QL: NEGATIVE MG/DL — SIGNIFICANT CHANGE UP
HBA1C BLD-MCNC: 6.2 % — HIGH (ref 4–5.6)
HCT VFR BLD CALC: 26.3 % — LOW (ref 42–52)
HCT VFR BLD CALC: 27 % — LOW (ref 42–52)
HCT VFR BLD CALC: 29.4 % — LOW (ref 42–52)
HGB BLD-MCNC: 8.8 G/DL — LOW (ref 14–18)
HGB BLD-MCNC: 9 G/DL — LOW (ref 14–18)
HGB BLD-MCNC: 9.7 G/DL — LOW (ref 14–18)
INR BLD: 3.72 RATIO — HIGH (ref 0.88–1.16)
KETONES UR-MCNC: NEGATIVE — SIGNIFICANT CHANGE UP
LACTATE SERPL-SCNC: 0.8 MMOL/L — SIGNIFICANT CHANGE UP (ref 0.5–2)
LEUKOCYTE ESTERASE UR-ACNC: NEGATIVE — SIGNIFICANT CHANGE UP
LYMPHOCYTES # BLD AUTO: 0.9 K/UL — LOW (ref 1–4.8)
LYMPHOCYTES # BLD AUTO: 8 % — LOW (ref 20–55)
MAGNESIUM SERPL-MCNC: 2 MG/DL — SIGNIFICANT CHANGE UP (ref 1.6–2.6)
MCHC RBC-ENTMCNC: 27.9 PG — SIGNIFICANT CHANGE UP (ref 27–31)
MCHC RBC-ENTMCNC: 33.3 G/DL — SIGNIFICANT CHANGE UP (ref 32–36)
MCV RBC AUTO: 83.6 FL — SIGNIFICANT CHANGE UP (ref 80–94)
MONOCYTES # BLD AUTO: 0.7 K/UL — SIGNIFICANT CHANGE UP (ref 0–0.8)
MONOCYTES NFR BLD AUTO: 6.2 % — SIGNIFICANT CHANGE UP (ref 3–10)
NEUTROPHILS # BLD AUTO: 9.9 K/UL — HIGH (ref 1.8–8)
NEUTROPHILS NFR BLD AUTO: 85.3 % — HIGH (ref 37–73)
NITRITE UR-MCNC: NEGATIVE — SIGNIFICANT CHANGE UP
OSMOLALITY SERPL: 310 MOSM/KG — HIGH (ref 280–300)
OSMOLALITY UR: 581 MOSM/KG — SIGNIFICANT CHANGE UP (ref 300–1000)
PH UR: 5 — SIGNIFICANT CHANGE UP (ref 5–8)
PHOSPHATE SERPL-MCNC: 4 MG/DL — SIGNIFICANT CHANGE UP (ref 2.4–4.7)
PLATELET # BLD AUTO: 209 K/UL — SIGNIFICANT CHANGE UP (ref 150–400)
POTASSIUM SERPL-MCNC: 4.5 MMOL/L — SIGNIFICANT CHANGE UP (ref 3.5–5.3)
POTASSIUM SERPL-SCNC: 4.5 MMOL/L — SIGNIFICANT CHANGE UP (ref 3.5–5.3)
PROT UR-MCNC: 30 MG/DL
PROTHROM AB SERPL-ACNC: 42 SEC — HIGH (ref 9.8–12.7)
RBC # BLD: 3.23 M/UL — LOW (ref 4.6–6.2)
RBC # FLD: 18.6 % — HIGH (ref 11–15.6)
RBC CASTS # UR COMP ASSIST: ABNORMAL /HPF (ref 0–4)
SODIUM SERPL-SCNC: 133 MMOL/L — LOW (ref 135–145)
SODIUM UR-SCNC: 32 MMOL/L — SIGNIFICANT CHANGE UP
SP GR SPEC: 1.01 — SIGNIFICANT CHANGE UP (ref 1.01–1.02)
T3 SERPL-MCNC: 63 NG/DL — LOW (ref 80–200)
T4 AB SER-ACNC: 4.7 UG/DL — SIGNIFICANT CHANGE UP (ref 4.5–12)
TROPONIN T SERPL-MCNC: 0.09 NG/ML — HIGH (ref 0–0.06)
TROPONIN T SERPL-MCNC: 0.1 NG/ML — HIGH (ref 0–0.06)
TSH SERPL-MCNC: 4.18 UIU/ML — SIGNIFICANT CHANGE UP (ref 0.27–4.2)
URATE SERPL-MCNC: 5.8 MG/DL — SIGNIFICANT CHANGE UP (ref 3.4–7)
UROBILINOGEN FLD QL: NEGATIVE MG/DL — SIGNIFICANT CHANGE UP
VIT B12 SERPL-MCNC: 585 PG/ML — SIGNIFICANT CHANGE UP (ref 232–1245)
WBC # BLD: 11.6 K/UL — HIGH (ref 4.8–10.8)
WBC # FLD AUTO: 11.6 K/UL — HIGH (ref 4.8–10.8)
WBC UR QL: NEGATIVE — SIGNIFICANT CHANGE UP

## 2018-07-30 PROCEDURE — 93010 ELECTROCARDIOGRAM REPORT: CPT

## 2018-07-30 PROCEDURE — 71045 X-RAY EXAM CHEST 1 VIEW: CPT | Mod: 26

## 2018-07-30 PROCEDURE — 99233 SBSQ HOSP IP/OBS HIGH 50: CPT

## 2018-07-30 PROCEDURE — 99223 1ST HOSP IP/OBS HIGH 75: CPT

## 2018-07-30 RX ORDER — ALLOPURINOL 300 MG
75 TABLET ORAL
Qty: 0 | Refills: 0 | COMMUNITY

## 2018-07-30 RX ORDER — ALLOPURINOL 300 MG
150 TABLET ORAL
Qty: 0 | Refills: 0 | COMMUNITY

## 2018-07-30 RX ORDER — ALLOPURINOL 300 MG
150 TABLET ORAL DAILY
Qty: 0 | Refills: 0 | Status: DISCONTINUED | OUTPATIENT
Start: 2018-07-30 | End: 2018-08-03

## 2018-07-30 RX ORDER — OXYCODONE AND ACETAMINOPHEN 5; 325 MG/1; MG/1
1 TABLET ORAL EVERY 6 HOURS
Qty: 0 | Refills: 0 | Status: DISCONTINUED | OUTPATIENT
Start: 2018-07-30 | End: 2018-08-03

## 2018-07-30 RX ADMIN — Medication 4: at 18:42

## 2018-07-30 RX ADMIN — PANTOPRAZOLE SODIUM 40 MILLIGRAM(S): 20 TABLET, DELAYED RELEASE ORAL at 05:27

## 2018-07-30 RX ADMIN — ATORVASTATIN CALCIUM 40 MILLIGRAM(S): 80 TABLET, FILM COATED ORAL at 21:30

## 2018-07-30 RX ADMIN — OXYCODONE AND ACETAMINOPHEN 1 TABLET(S): 5; 325 TABLET ORAL at 20:04

## 2018-07-30 RX ADMIN — Medication 75 MILLIGRAM(S): at 18:29

## 2018-07-30 RX ADMIN — OXYCODONE AND ACETAMINOPHEN 1 TABLET(S): 5; 325 TABLET ORAL at 21:00

## 2018-07-30 RX ADMIN — Medication 150 MILLIGRAM(S): at 18:30

## 2018-07-30 RX ADMIN — OXYCODONE AND ACETAMINOPHEN 1 TABLET(S): 5; 325 TABLET ORAL at 14:03

## 2018-07-30 RX ADMIN — PANTOPRAZOLE SODIUM 40 MILLIGRAM(S): 20 TABLET, DELAYED RELEASE ORAL at 18:30

## 2018-07-30 RX ADMIN — SODIUM CHLORIDE 75 MILLILITER(S): 9 INJECTION INTRAMUSCULAR; INTRAVENOUS; SUBCUTANEOUS at 05:27

## 2018-07-30 RX ADMIN — Medication 75 MILLIGRAM(S): at 21:30

## 2018-07-30 RX ADMIN — Medication 75 MILLIGRAM(S): at 05:27

## 2018-07-30 RX ADMIN — Medication 650 MILLIGRAM(S): at 01:56

## 2018-07-30 RX ADMIN — Medication 25 MILLIGRAM(S): at 18:29

## 2018-07-30 NOTE — ED ADULT NURSE REASSESSMENT NOTE - COMFORT CARE
repositioned/plan of care explained/warm blanket provided/side rails up/treatment delay explained/wait time explained

## 2018-07-30 NOTE — CONSULT NOTE ADULT - PROBLEM SELECTOR RECOMMENDATION 9
Patient with rectal bleeding. Scant blood on rectal this am  loose yellow stool  check stool studies  clear liquid diet  Bleeding maybe hemorrrhoidal, mild colitis, mass,   H+H fairly stable. Please keep INR in low therapeutic range.   As per chart, pt refused colonoscopy recently. MAy consider virtual colon if stool studies negative and if pt is agreeable ( coumadin would not need to be held)

## 2018-07-30 NOTE — CONSULT NOTE ADULT - SUBJECTIVE AND OBJECTIVE BOX
Patient is a 83y old  Male who presents with a chief complaint of BRBPR (29 Jul 2018 20:26)      HPI:  Patient is a 83 yr old male with PMH of CAD s/p CABG, DM II, s/p MVR on coumadin, and s/p PPM ,?COPD    Poor historian corwin monk presented to [facility] ED with complaints of bright red blood per rectum since yesterday .   Patient knows is here for rectal bleeding, but cannot recall the details. As per Er chart, pt had   at least 6 bloody BM's with significant blood. diarrhea for 6 weeks. Patient is denying diarrhea to me. AS per chart he was offered colonoscopy , but refused. Has not had colonscopy for many  years.   Had supratherapeutic INR last week ( INR of 8 as per primary ). Coumadin was held a few days. Wife told primary  that out patient CT was negative for source of diarrhea. Unknown if he had stool studies as outpatient. Pt denies fever, or abdominal pain. No nausea or vomiting. No CP, no SOB. I  call and left message with wife to call me back.     REVIEW OF SYSTEMS:  Constitutional: No fever, weight loss or fatigue  ENMT:  No difficulty hearing, tinnitus, vertigo; No sinus or throat pain  Respiratory: No cough, wheezing, chills or hemoptysis  Cardiovascular: No chest pain, palpitations, dizziness or leg swelling  Gastrointestinal: No abdominal or epigastric pain. No nausea, vomiting or hematemesis; No diarrhea or constipation. + hematochezia.- possibly diarrhea  Skin: No itching, burning, rashes or lesions   Musculoskeletal: No joint pain or swelling; No muscle, back or extremity pain    PAST MEDICAL & SURGICAL HISTORY:  Chronic pain  Insomnia  Diabetes  Mitral valve replaced  Pacemaker  S/P CABG (coronary artery bypass graft)      FAMILY HISTORY:  No pertinent family history in first degree relatives      SOCIAL HISTORY:  Smoking Status: [ ] Current, [ ] Former, [ ] Never  Pack Years:  [  ] EtOH-no  [  ] IVDA-no    MEDICATIONS:  MEDICATIONS  (STANDING):  allopurinol 75 milliGRAM(s) Oral daily  atorvastatin 40 milliGRAM(s) Oral at bedtime  dextrose 5%. 1000 milliLiter(s) (50 mL/Hr) IV Continuous <Continuous>  dextrose 50% Injectable 12.5 Gram(s) IV Push once  dextrose 50% Injectable 25 Gram(s) IV Push once  dextrose 50% Injectable 25 Gram(s) IV Push once  insulin lispro (HumaLOG) corrective regimen sliding scale   SubCutaneous three times a day before meals  metoprolol tartrate 25 milliGRAM(s) Oral two times a day  pantoprazole  Injectable 40 milliGRAM(s) IV Push two times a day  pregabalin 75 milliGRAM(s) Oral three times a day  sodium chloride 0.9%. 1000 milliLiter(s) (75 mL/Hr) IV Continuous <Continuous>    MEDICATIONS  (PRN):  acetaminophen   Tablet 650 milliGRAM(s) Oral every 6 hours PRN For Temp greater than 38 C (100.4 F)  dextrose 40% Gel 15 Gram(s) Oral once PRN Blood Glucose LESS THAN 70 milliGRAM(s)/deciliter  glucagon  Injectable 1 milliGRAM(s) IntraMuscular once PRN Glucose LESS THAN 70 milligrams/deciliter      Allergies    No Known Allergies    Intolerances        Vital Signs Last 24 Hrs  T(C): 37.1 (30 Jul 2018 04:07), Max: 38.9 (30 Jul 2018 01:59)  T(F): 98.7 (30 Jul 2018 04:07), Max: 102 (30 Jul 2018 01:59)  HR: 64 (30 Jul 2018 05:25) (64 - 88)  BP: 93/58 (30 Jul 2018 05:25) (93/58 - 112/62)  BP(mean): --  RR: 20 (30 Jul 2018 05:25) (18 - 20)  SpO2: 94% (30 Jul 2018 05:25) (92% - 98%)        PHYSICAL EXAM:    General: Well developed; well nourished; in no acute distress  HEENT: MMM, conjunctiva and sclera clear  H- RRR  L- CTA  Gastrointestinal: Soft, non-tender non-distended; Normal bowel sounds; No rebound or guarding  Extremities: Normal range of motion, No clubbing, cyanosis or edema  Neurological: Alert and oriented x3  Skin: Warm and dry. No obvious rash  rectal- yellow stool ,  blood tinged. + internal hemorrhoids      LABS:                        9.3    13.3  )-----------( 217      ( 29 Jul 2018 21:29 )             27.5     30 Jul 2018 06:25    133    |  99     |  40.0   ----------------------------<  129    4.5     |  23.0   |  1.19     Ca    8.3        30 Jul 2018 06:25  Phos  4.0       30 Jul 2018 06:25  Mg     2.0       30 Jul 2018 06:25    TPro  7.3    /  Alb  3.4    /  TBili  1.2    /  DBili  x      /  AST  39     /  ALT  25     /  AlkPhos  83     / Amylase x      /Lipase x      29 Jul 2018 09:37              RADIOLOGY & ADDITIONAL STUDIES:     < from: CT Angio Abdomen and Pelvis w/ IV Cont (07.29.18 @ 11:57) >  IMPRESSION: No active contrast extravasation to suggest a source of   active GI bleed.    < end of copied text >

## 2018-07-30 NOTE — CONSULT NOTE ADULT - SUBJECTIVE AND OBJECTIVE BOX
[facility]                                          [address], [address], [place] [address]                                               PHONE: [phone]    FAX: [phone] [address], [address], [place] [address]                                       PHONE: [phone]    FAX: [phone]  *******************************************************************************    Reason for Consult: Mechanical MVR; GIB    HPI:  DAVID LUIS is a 83y man with significant history of HTN, DMII, HLD, CAD s/p remote CABG, mechanical MVR (2012), SSS s/p PPM (2009), and moderate b/l carotid disease, who presents to the ER for evaluation of bright red blood per rectum. The patient states that he was experiencing bloody bowel movements for the last several days but states that this appears to have stopped at this time.  The patient states that he was told last week that his INR was >8.  He held coumadin for 2 days and subsequent INR was 3.7 and coumadin was restarted at lower dose of 4 mg daily.  He is accompanied by his wife and daughter who state that the patient has been falling frequently and has been increasingly weak.  The patient denies any chest pain, palpitations, shortness of breath or difficulty breathing.  No syncope or near syncope.      PAST MEDICAL & SURGICAL HISTORY:  Chronic pain  Insomnia  Diabetes  Mitral valve replaced  Pacemaker  S/P CABG (coronary artery bypass graft)      No Known Allergies      MEDICATIONS  (STANDING):  allopurinol 150 milliGRAM(s) Oral daily  atorvastatin 40 milliGRAM(s) Oral at bedtime  dextrose 5%. 1000 milliLiter(s) (50 mL/Hr) IV Continuous <Continuous>  dextrose 50% Injectable 12.5 Gram(s) IV Push once  dextrose 50% Injectable 25 Gram(s) IV Push once  dextrose 50% Injectable 25 Gram(s) IV Push once  insulin lispro (HumaLOG) corrective regimen sliding scale   SubCutaneous three times a day before meals  metoprolol tartrate 25 milliGRAM(s) Oral two times a day  pantoprazole  Injectable 40 milliGRAM(s) IV Push two times a day  pregabalin 75 milliGRAM(s) Oral three times a day  sodium chloride 0.9%. 1000 milliLiter(s) (75 mL/Hr) IV Continuous <Continuous>    MEDICATIONS  (PRN):  acetaminophen   Tablet 650 milliGRAM(s) Oral every 6 hours PRN For Temp greater than 38 C (100.4 F)  dextrose 40% Gel 15 Gram(s) Oral once PRN Blood Glucose LESS THAN 70 milliGRAM(s)/deciliter  glucagon  Injectable 1 milliGRAM(s) IntraMuscular once PRN Glucose LESS THAN 70 milligrams/deciliter  oxyCODONE    5 mG/acetaminophen 325 mG 1 Tablet(s) Oral every 6 hours PRN Severe Pain (7 - 10)      Social History: no active tobacco / EtOH / IVDA    Family History: No pertinent family history in first degree relatives      ROS: As noted above, otherwise unremarkable.    Vital Signs Last 24 Hrs  T(C): 37 (30 Jul 2018 08:04), Max: 38.9 (30 Jul 2018 01:59)  T(F): 98.6 (30 Jul 2018 08:04), Max: 102 (30 Jul 2018 01:59)  HR: 66 (30 Jul 2018 08:04) (64 - 71)  BP: 101/50 (30 Jul 2018 08:04) (93/58 - 112/62)  BP(mean): --  RR: 18 (30 Jul 2018 08:04) (18 - 20)  SpO2: 96% (30 Jul 2018 08:04) (92% - 96%)    I&O's Detail    I&O's Summary    PHYSICAL EXAM:  General: Frail elderly man, comfortable in bed in nad  HEENT: Head: normocephalic, atraumatic  Eyes: Pupils equal and reactive  Neck: Supple, no carotid bruit, no JVD, no HJR  CARDIOVASCULAR: Normal S1 and S2, no murmur, rub, or gallop  LUNGS: Clear to auscultation bilaterally, no rales, rhonchi or wheeze  ABDOMEN: Soft, nontender, non-distended, positive bowel sounds, no mass or bruit  EXTREMITIES: No edema, distal pulses WNL  SKIN: Warm and dry with normal turgor  NEURO: Alert & oriented x 3, grossly intact  PSYCH: normal mood and affect    LABS:                        9.7    x     )-----------( x        ( 30 Jul 2018 12:39 )             29.4     07-30    133<L>  |  99  |  40.0<H>  ----------------------------<  129<H>  4.5   |  23.0  |  1.19    Ca    8.3<L>      30 Jul 2018 06:25  Phos  4.0     07-30  Mg     2.0     07-30    TPro  7.3  /  Alb  3.4  /  TBili  1.2  /  DBili  x   /  AST  39  /  ALT  25  /  AlkPhos  83  07-29    CARDIAC MARKERS ( 30 Jul 2018 14:59 )  x     / 0.10 ng/mL / x     / x     / x      CARDIAC MARKERS ( 30 Jul 2018 06:25 )  x     / 0.09 ng/mL / x     / x     / x          PT/INR - ( 30 Jul 2018 06:25 )   PT: 42.0 sec;   INR: 3.72 ratio         PTT - ( 29 Jul 2018 09:37 )  PTT:36.7 sec  Thyroid Stimulating Hormone, Serum: 4.18 uIU/mL (07-30 @ 06:25)      RADIOLOGY & ADDITIONAL STUDIES:    ECG: V paced rhythm @70/min; occasional PVC's    ECHO (06/2017): mild concentric LVH; normal LV systolic function E 55-60%; mildly dilated LA, mechanical MVR; mildly elevated PASP.     NUCLEAR STRESS TEST (09/2016): normal rest-stress myocardial perfusion; normal LV function EF 76%.       Assessment and Plan:  In summary,   DAVID LUIS is a 83y man with significant history of HTN, DMII, HLD, CAD s/p remote CABG, mechanical MVR (2012), SSS s/p PPM (2009), and moderate b/l carotid disease, admitted with bright red blood per rectum and weakness.    - Monitor on telemetry  - The patient has been chest pain free since admission.  Mildly elevated flat-trending troponin is nonspecific.  Doubt acute MI.  Continue medical management fo known severe CAD.   - Echocardiogram from 06/2017 reviewed as above.  No need to repeat at this time.    - No evidence of ischemia or CHF clinically, eventual ischemic evaluation (likely as outpatient)  - Rhythm/hemodynamics stable = continue current doses for now and titrate PRN  - The patient is s/p mechanical mitral valve replacement, maintained on coumadin with goal INR of 2.5-3.5.  This should be continued.  GI following regarding BRBPR. Patient initially refused colonoscopy but now appears willing to proceed.  There is no cardiac contraindication to proceeding with colonoscopy.      We will follow with you as needed.  Please do not hesitate to call with any questions or concerns.    Thank you for allowing me to participate in the care of your patient.      Sincerely,    Olegario Alvarado M.D.

## 2018-07-30 NOTE — ED ADULT NURSE REASSESSMENT NOTE - NS ED NURSE REASSESS COMMENT FT1
Pt with large void in diaper, cleaned, linens and gown changed. Scant amount of red blood when buttocks wiped, no signs of active bleeding. Dressing to lower sacrum/coccyx clean, dry and intact. Pt moves well in bed, all leads changed, am care provided, pt made comfortable on stretcher, call bell within reach, fall precautions in place.
Pt's wife at bedside and states pt refused Endoscopy/Colonoscopy this morning when test was offered per wife pt is agreeing to testing now, Dr Curran' office contacted and message left with Olga . Family made aware.
pt a&ox2-3 at times. pt resting in bed, denies any episode of bloody stool at this time. pt c/o 7/10 bilateral leg pain from bed. labs drawn by , updated on plan of care, call bell in reach
Pt resting comfortably on stretcher, resp even and unlabored, color good, incontinence care provided, linens changed, pt with scant amount of bright red blood when wiping rectum, MD aware, no intervention at this time, red blanchable area to coccyx, mepilex placed for precaution, pt medicated as per MD orders regarding fever, awaiting admit bed, will continue to monitor
pt a&ox2-3, states he is feeling much better. no episodes of rectal bleeding at this time. vss. afebrile. denies any pain/discomfort. will monitor
spoke with ahmet in lab regarding 0400 labs, will send tech
Pt received in the stretcher resting comfortably, no distress noted, no chest pain reported, awaiting blood work results, will continue to  monitor

## 2018-07-30 NOTE — PROGRESS NOTE ADULT - ASSESSMENT
Patient is a 83 yr old male with PMH of CAD s/p CABG, DM II, s/p MVR on coumadin, and s/p PPM who presented to [facility] ED with complaints of bright red blood per rectum since yesterday evening.    1. Lower GI bleed  -patient with 6 week history of diarrhea and now with BRBPR  -Hb 10.4 --> 9.0  -Occult blood positive   -CTA negative   -Protonix IV BID  -Hold Coumadin given supra-therapeutic INR  -Clear liquid diet  -Stool studies per GI  -No active bleeding or abdominal pain in the ED; patient currently hemodynamically stable  -Type and Screen & Serial H/H  -Continue Judicious hydration  -GI consult appreciated; possible colonoscopy if patient is agreeable    2. 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 MVR  -on Coumadin but supra-therapeutic INR  -hold Coumadin and check INR in AM    4. DM  -check HbA1c  -hold oral agents  -ISS    5. CAD s/p CABG  -asymptomatic  -TNI elevated, likely demand ischemia in light of GI bleed  -EKG - occasional PVCs  -cardio recommendations appreciated  -not on ASA or plavix  -continue metoprolol and statin    6. Leukocytosis  -likely reactive  -monitor temp curve and WBC count  -check UA negative   -CXR negative  -blood cultures if febrile    7. Hyponatremia - improving  -likely secondary to poor solute intake and hypovolemia  -urine studies reviewed  -check orthostatics  -continuous judicious hydration  -strict I/Os, daily weights  -repeat BMP in AM    8. Forgetfulness  -likely secondary to dementia  -needs formal cognitive function testing as outpatient  -check B12, TSH WNL  -f/u RPR  -CT head - negative for acute pathology    9. Chronic pain syndrome  -continue lyrica  -add percocet PRN  -eventual PT evaluation    DVT prophylaxis - SCDs

## 2018-07-30 NOTE — PROGRESS NOTE ADULT - SUBJECTIVE AND OBJECTIVE BOX
CHIEF COMPLAINT/INTERVAL HISTORY:    Patient is a 83y old  Male who presents with a chief complaint of BRBPR (2018 20:26)    SUBJECTIVE & OBJECTIVE: Pt seen and examined at bedside. No overnight events. No further episodes of bright red per rectum. H/H stable. Seen by GI, stool studies ordered. Cardio consulted given elevated TNI.     ROS: No chest pain, palpitations, SOB, light headedness, dizziness, headache, nausea/vomiting, fevers/chills, abdominal pain, dysuria or increased urinary frequency.    ICU Vital Signs Last 24 Hrs  T(C): 37 (2018 18:18), Max: 38.9 (2018 01:59)  T(F): 98.6 (2018 18:18), Max: 102 (2018 01:59)  HR: 62 (2018 18:18) (62 - 71)  BP: 110/54 (2018 18:18) (93/58 - 112/62)  RR: 18 (2018 18:18) (18 - 20)  SpO2: 96% (2018 18:18) (92% - 96%)    MEDICATIONS  (STANDING):  allopurinol 150 milliGRAM(s) Oral daily  atorvastatin 40 milliGRAM(s) Oral at bedtime  dextrose 5%. 1000 milliLiter(s) (50 mL/Hr) IV Continuous <Continuous>  dextrose 50% Injectable 12.5 Gram(s) IV Push once  dextrose 50% Injectable 25 Gram(s) IV Push once  dextrose 50% Injectable 25 Gram(s) IV Push once  insulin lispro (HumaLOG) corrective regimen sliding scale   SubCutaneous three times a day before meals  metoprolol tartrate 25 milliGRAM(s) Oral two times a day  pantoprazole  Injectable 40 milliGRAM(s) IV Push two times a day  pregabalin 75 milliGRAM(s) Oral three times a day  sodium chloride 0.9%. 1000 milliLiter(s) (75 mL/Hr) IV Continuous <Continuous>    MEDICATIONS  (PRN):  acetaminophen   Tablet 650 milliGRAM(s) Oral every 6 hours PRN For Temp greater than 38 C (100.4 F)  dextrose 40% Gel 15 Gram(s) Oral once PRN Blood Glucose LESS THAN 70 milliGRAM(s)/deciliter  glucagon  Injectable 1 milliGRAM(s) IntraMuscular once PRN Glucose LESS THAN 70 milligrams/deciliter  oxyCODONE    5 mG/acetaminophen 325 mG 1 Tablet(s) Oral every 6 hours PRN Severe Pain (7 - 10)      LABS:                        9.7    x     )-----------( x        ( 2018 12:39 )             29.4     07-30    133<L>  |  99  |  40.0<H>  ----------------------------<  129<H>  4.5   |  23.0  |  1.19    Ca    8.3<L>      2018 06:25  Phos  4.0     07-30  Mg     2.0     07-30    TPro  7.3  /  Alb  3.4  /  TBili  1.2  /  DBili  x   /  AST  39  /  ALT  25  /  AlkPhos  83  07-29    PT/INR - ( 2018 06:25 )   PT: 42.0 sec;   INR: 3.72 ratio         PTT - ( 2018 09:37 )  PTT:36.7 sec  Urinalysis Basic - ( 2018 01:54 )    Color: Yellow / Appearance: Clear / S.010 / pH: x  Gluc: x / Ketone: Negative  / Bili: Negative / Urobili: Negative mg/dL   Blood: x / Protein: 30 mg/dL / Nitrite: Negative   Leuk Esterase: Negative / RBC: 6-10 /HPF / WBC Negative   Sq Epi: x / Non Sq Epi: Occasional / Bacteria: x        CAPILLARY BLOOD GLUCOSE      POCT Blood Glucose.: 328 mg/dL (2018 17:37)  POCT Blood Glucose.: 132 mg/dL (2018 13:13)  POCT Blood Glucose.: 135 mg/dL (2018 08:09)      PHYSICAL EXAM:    GENERAL: elderly male, laying in bed, NAD  HEAD:  Atraumatic, Normocephalic  EYES: EOMI, PERRLA, conjunctiva and sclera clear  ENMT: Moist mucous membranes  NECK: Supple  NERVOUS SYSTEM:  Alert & Oriented X3  CHEST/LUNG: Clear to auscultation bilaterally  HEART: Regular rate and rhythm; +S1/S2  ABDOMEN: Soft, Nontender, Nondistended; Bowel sounds present  EXTREMITIES:  + pedal edema

## 2018-07-31 DIAGNOSIS — R19.7 DIARRHEA, UNSPECIFIED: ICD-10-CM

## 2018-07-31 LAB
ACETONE SERPL-MCNC: NEGATIVE — SIGNIFICANT CHANGE UP
ANION GAP SERPL CALC-SCNC: 14 MMOL/L — SIGNIFICANT CHANGE UP (ref 5–17)
APTT BLD: 45.8 SEC — HIGH (ref 27.5–37.4)
BASOPHILS # BLD AUTO: 0 K/UL — SIGNIFICANT CHANGE UP (ref 0–0.2)
BASOPHILS NFR BLD AUTO: 0.1 % — SIGNIFICANT CHANGE UP (ref 0–2)
BUN SERPL-MCNC: 32 MG/DL — HIGH (ref 8–20)
CALCIUM SERPL-MCNC: 7.9 MG/DL — LOW (ref 8.6–10.2)
CHLORIDE SERPL-SCNC: 98 MMOL/L — SIGNIFICANT CHANGE UP (ref 98–107)
CO2 SERPL-SCNC: 20 MMOL/L — LOW (ref 22–29)
CREAT SERPL-MCNC: 1.27 MG/DL — SIGNIFICANT CHANGE UP (ref 0.5–1.3)
EOSINOPHIL # BLD AUTO: 0 K/UL — SIGNIFICANT CHANGE UP (ref 0–0.5)
EOSINOPHIL NFR BLD AUTO: 0 % — SIGNIFICANT CHANGE UP (ref 0–5)
GLUCOSE BLDC GLUCOMTR-MCNC: 147 MG/DL — HIGH (ref 70–99)
GLUCOSE BLDC GLUCOMTR-MCNC: 171 MG/DL — HIGH (ref 70–99)
GLUCOSE BLDC GLUCOMTR-MCNC: 204 MG/DL — HIGH (ref 70–99)
GLUCOSE SERPL-MCNC: 134 MG/DL — HIGH (ref 70–115)
HCT VFR BLD CALC: 26.6 % — LOW (ref 42–52)
HGB BLD-MCNC: 8.9 G/DL — LOW (ref 14–18)
INR BLD: 6.21 RATIO — CRITICAL HIGH (ref 0.88–1.16)
LYMPHOCYTES # BLD AUTO: 0.6 K/UL — LOW (ref 1–4.8)
LYMPHOCYTES # BLD AUTO: 4.9 % — LOW (ref 20–55)
MAGNESIUM SERPL-MCNC: 1.7 MG/DL — SIGNIFICANT CHANGE UP (ref 1.6–2.6)
MCHC RBC-ENTMCNC: 28 PG — SIGNIFICANT CHANGE UP (ref 27–31)
MCHC RBC-ENTMCNC: 33.5 G/DL — SIGNIFICANT CHANGE UP (ref 32–36)
MCV RBC AUTO: 83.6 FL — SIGNIFICANT CHANGE UP (ref 80–94)
MONOCYTES # BLD AUTO: 1 K/UL — HIGH (ref 0–0.8)
MONOCYTES NFR BLD AUTO: 7.6 % — SIGNIFICANT CHANGE UP (ref 3–10)
NEUTROPHILS # BLD AUTO: 11.7 K/UL — HIGH (ref 1.8–8)
NEUTROPHILS NFR BLD AUTO: 87.1 % — HIGH (ref 37–73)
PHOSPHATE SERPL-MCNC: 3.6 MG/DL — SIGNIFICANT CHANGE UP (ref 2.4–4.7)
PLATELET # BLD AUTO: 192 K/UL — SIGNIFICANT CHANGE UP (ref 150–400)
POTASSIUM SERPL-MCNC: 4.8 MMOL/L — SIGNIFICANT CHANGE UP (ref 3.5–5.3)
POTASSIUM SERPL-SCNC: 4.8 MMOL/L — SIGNIFICANT CHANGE UP (ref 3.5–5.3)
PROCALCITONIN SERPL-MCNC: 1.18 NG/ML — SIGNIFICANT CHANGE UP (ref 0–0.04)
PROTHROM AB SERPL-ACNC: 70.9 SEC — HIGH (ref 9.8–12.7)
RAPID RVP RESULT: SIGNIFICANT CHANGE UP
RBC # BLD: 3.18 M/UL — LOW (ref 4.6–6.2)
RBC # FLD: 18.8 % — HIGH (ref 11–15.6)
SODIUM SERPL-SCNC: 132 MMOL/L — LOW (ref 135–145)
T PALLIDUM AB TITR SER: NEGATIVE — SIGNIFICANT CHANGE UP
TROPONIN T SERPL-MCNC: 0.1 NG/ML — HIGH (ref 0–0.06)
TROPONIN T SERPL-MCNC: 0.16 NG/ML — HIGH (ref 0–0.06)
TROPONIN T SERPL-MCNC: 0.22 NG/ML — HIGH (ref 0–0.06)
WBC # BLD: 13.4 K/UL — HIGH (ref 4.8–10.8)
WBC # FLD AUTO: 13.4 K/UL — HIGH (ref 4.8–10.8)

## 2018-07-31 PROCEDURE — 99233 SBSQ HOSP IP/OBS HIGH 50: CPT

## 2018-07-31 PROCEDURE — 93010 ELECTROCARDIOGRAM REPORT: CPT

## 2018-07-31 RX ORDER — PHYTONADIONE (VIT K1) 5 MG
5 TABLET ORAL ONCE
Qty: 0 | Refills: 0 | Status: COMPLETED | OUTPATIENT
Start: 2018-07-31 | End: 2018-07-31

## 2018-07-31 RX ORDER — MAGNESIUM SULFATE 500 MG/ML
1 VIAL (ML) INJECTION ONCE
Qty: 0 | Refills: 0 | Status: COMPLETED | OUTPATIENT
Start: 2018-07-31 | End: 2018-07-31

## 2018-07-31 RX ADMIN — SODIUM CHLORIDE 75 MILLILITER(S): 9 INJECTION INTRAMUSCULAR; INTRAVENOUS; SUBCUTANEOUS at 05:14

## 2018-07-31 RX ADMIN — Medication 75 MILLIGRAM(S): at 22:49

## 2018-07-31 RX ADMIN — OXYCODONE AND ACETAMINOPHEN 1 TABLET(S): 5; 325 TABLET ORAL at 08:56

## 2018-07-31 RX ADMIN — Medication 50 GRAM(S): at 11:38

## 2018-07-31 RX ADMIN — ATORVASTATIN CALCIUM 40 MILLIGRAM(S): 80 TABLET, FILM COATED ORAL at 22:49

## 2018-07-31 RX ADMIN — Medication 75 MILLIGRAM(S): at 13:43

## 2018-07-31 RX ADMIN — Medication 25 MILLIGRAM(S): at 05:13

## 2018-07-31 RX ADMIN — Medication 25 MILLIGRAM(S): at 22:49

## 2018-07-31 RX ADMIN — Medication 1: at 18:05

## 2018-07-31 RX ADMIN — PANTOPRAZOLE SODIUM 40 MILLIGRAM(S): 20 TABLET, DELAYED RELEASE ORAL at 18:06

## 2018-07-31 RX ADMIN — OXYCODONE AND ACETAMINOPHEN 1 TABLET(S): 5; 325 TABLET ORAL at 07:56

## 2018-07-31 RX ADMIN — Medication 2: at 12:46

## 2018-07-31 RX ADMIN — OXYCODONE AND ACETAMINOPHEN 1 TABLET(S): 5; 325 TABLET ORAL at 16:05

## 2018-07-31 RX ADMIN — Medication 75 MILLIGRAM(S): at 05:13

## 2018-07-31 RX ADMIN — OXYCODONE AND ACETAMINOPHEN 1 TABLET(S): 5; 325 TABLET ORAL at 22:49

## 2018-07-31 RX ADMIN — Medication 101 MILLIGRAM(S): at 09:45

## 2018-07-31 RX ADMIN — PANTOPRAZOLE SODIUM 40 MILLIGRAM(S): 20 TABLET, DELAYED RELEASE ORAL at 05:13

## 2018-07-31 RX ADMIN — Medication 650 MILLIGRAM(S): at 05:13

## 2018-07-31 NOTE — PHYSICAL THERAPY INITIAL EVALUATION ADULT - ADDITIONAL COMMENTS
Pt lives with wife in a 1 story house with  steps to enter. Pt amb with RW and reports being Modified Independent with all PTA, without devices.

## 2018-07-31 NOTE — PROGRESS NOTE ADULT - SUBJECTIVE AND OBJECTIVE BOX
CHIEF COMPLAINT/INTERVAL HISTORY:    Patient is a 83y old  Male who presents with a chief complaint of BRBPR (2018 20:26)    SUBJECTIVE & OBJECTIVE: Pt seen and examined at bedside. No overnight events. Denies any active complaints, including chest pain, SOB, abdominal pain or further BRBPR. Diet advanced today. TNI trending up but asymptomatic; obtain EKG.    ROS: No chest pain, palpitations, SOB, light headedness, dizziness, headache, nausea/vomiting, fevers/chills, abdominal pain, dysuria or increased urinary frequency.    ICU Vital Signs Last 24 Hrs  T(C): 35.9 (2018 11:56), Max: 38.1 (2018 04:57)  T(F): 96.6 (2018 11:56), Max: 100.5 (2018 04:57)  HR: 61 (2018 11:56) (60 - 84)  BP: 105/55 (2018 11:56) (94/51 - 147/70)  RR: 17 (2018 11:56) (17 - 20)  SpO2: 93% (2018 11:56) (85% - 96%)    MEDICATIONS  (STANDING):  allopurinol 150 milliGRAM(s) Oral daily  atorvastatin 40 milliGRAM(s) Oral at bedtime  dextrose 5%. 1000 milliLiter(s) (50 mL/Hr) IV Continuous <Continuous>  dextrose 50% Injectable 12.5 Gram(s) IV Push once  dextrose 50% Injectable 25 Gram(s) IV Push once  dextrose 50% Injectable 25 Gram(s) IV Push once  insulin lispro (HumaLOG) corrective regimen sliding scale   SubCutaneous three times a day before meals  metoprolol tartrate 25 milliGRAM(s) Oral two times a day  pantoprazole  Injectable 40 milliGRAM(s) IV Push two times a day  pregabalin 75 milliGRAM(s) Oral three times a day    MEDICATIONS  (PRN):  acetaminophen   Tablet 650 milliGRAM(s) Oral every 6 hours PRN For Temp greater than 38 C (100.4 F)  dextrose 40% Gel 15 Gram(s) Oral once PRN Blood Glucose LESS THAN 70 milliGRAM(s)/deciliter  glucagon  Injectable 1 milliGRAM(s) IntraMuscular once PRN Glucose LESS THAN 70 milligrams/deciliter  oxyCODONE    5 mG/acetaminophen 325 mG 1 Tablet(s) Oral every 6 hours PRN Severe Pain (7 - 10)      LABS:                        8.9    13.4  )-----------( 192      ( 2018 07:44 )             26.6     07-    132<L>  |  98  |  32.0<H>  ----------------------------<  134<H>  4.8   |  20.0<L>  |  1.27    Ca    7.9<L>      2018 07:44  Phos  3.6       Mg     1.7     31      PT/INR - ( 2018 07:44 )   PT: 70.9 sec;   INR: 6.21 ratio         PTT - ( 2018 07:44 )  PTT:45.8 sec  Urinalysis Basic - ( 2018 01:54 )    Color: Yellow / Appearance: Clear / S.010 / pH: x  Gluc: x / Ketone: Negative  / Bili: Negative / Urobili: Negative mg/dL   Blood: x / Protein: 30 mg/dL / Nitrite: Negative   Leuk Esterase: Negative / RBC: 6-10 /HPF / WBC Negative   Sq Epi: x / Non Sq Epi: Occasional / Bacteria: x        CAPILLARY BLOOD GLUCOSE      POCT Blood Glucose.: 204 mg/dL (2018 11:43)  POCT Blood Glucose.: 147 mg/dL (2018 08:01)  POCT Blood Glucose.: 328 mg/dL (2018 17:37)  POCT Blood Glucose.: 132 mg/dL (2018 13:13)      PHYSICAL EXAM:    GENERAL: elderly male, laying in bed, NAD  HEAD:  Atraumatic, Normocephalic  EYES: EOMI, PERRLA, conjunctiva and sclera clear  ENMT: Moist mucous membranes  NECK: Supple  NERVOUS SYSTEM:  Alert & Oriented X3  CHEST/LUNG: Clear to auscultation bilaterally  HEART: Regular rate and rhythm; +S1/S2  ABDOMEN: Soft, Nontender, Nondistended; Bowel sounds present  EXTREMITIES:  + pedal edema

## 2018-07-31 NOTE — PROGRESS NOTE ADULT - SUBJECTIVE AND OBJECTIVE BOX
Pt seen and examined. He has had no further BM's or hematochezia. No stool studies collected as pt. has had no BM's.     REVIEW OF SYSTEMS:  Constitutional: No fever, weight loss or fatigue  Cardiovascular: No chest pain, palpitations, dizziness or leg swelling  Gastrointestinal: No abdominal or epigastric pain. No nausea, vomiting or hematemesis; No diarrhea or constipation. No melena or hematochezia.  Skin: No itching, burning, rashes or lesions       MEDICATIONS:  MEDICATIONS  (STANDING):  allopurinol 150 milliGRAM(s) Oral daily  atorvastatin 40 milliGRAM(s) Oral at bedtime  dextrose 5%. 1000 milliLiter(s) (50 mL/Hr) IV Continuous <Continuous>  dextrose 50% Injectable 12.5 Gram(s) IV Push once  dextrose 50% Injectable 25 Gram(s) IV Push once  dextrose 50% Injectable 25 Gram(s) IV Push once  insulin lispro (HumaLOG) corrective regimen sliding scale   SubCutaneous three times a day before meals  metoprolol tartrate 25 milliGRAM(s) Oral two times a day  pantoprazole  Injectable 40 milliGRAM(s) IV Push two times a day  pregabalin 75 milliGRAM(s) Oral three times a day  sodium chloride 0.9%. 1000 milliLiter(s) (75 mL/Hr) IV Continuous <Continuous>    MEDICATIONS  (PRN):  acetaminophen   Tablet 650 milliGRAM(s) Oral every 6 hours PRN For Temp greater than 38 C (100.4 F)  dextrose 40% Gel 15 Gram(s) Oral once PRN Blood Glucose LESS THAN 70 milliGRAM(s)/deciliter  glucagon  Injectable 1 milliGRAM(s) IntraMuscular once PRN Glucose LESS THAN 70 milligrams/deciliter  oxyCODONE    5 mG/acetaminophen 325 mG 1 Tablet(s) Oral every 6 hours PRN Severe Pain (7 - 10)      Allergies    No Known Allergies    Intolerances        Vital Signs Last 24 Hrs  T(C): 38.1 (2018 04:57), Max: 38.1 (2018 04:57)  T(F): 100.5 (2018 04:57), Max: 100.5 (2018 04:57)  HR: 84 (2018 04:57) (61 - 84)  BP: 147/70 (2018 04:57) (100/52 - 147/70)  BP(mean): --  RR: 18 (2018 04:57) (18 - 18)  SpO2: 85% (2018 04:57) (85% - 96%)      PHYSICAL EXAM:    General: Well developed; well nourished; in no acute distress  HEENT: MMM, conjunctiva pink and sclera anicteric.  Lungs: clear to auscultation bilaterally.  Cor: RRR S1, S2 only.  Gastrointestinal: Abdomen: Soft non-tender non-distended; Normal bowel sounds; No hepatosplenomegaly  Extremities: no cyanosis, clubbing or edema.  Skin: Warm and dry. No obvious rash  Neuro: Pt. a + o x 3    LABS:  CBC Full  -  ( 2018 12:39 )  WBC Count : x  Hemoglobin : 9.7 g/dL  Hematocrit : 29.4 %  Platelet Count - Automated : x  Mean Cell Volume : x  Mean Cell Hemoglobin : x  Mean Cell Hemoglobin Concentration : x  Auto Neutrophil # : x  Auto Lymphocyte # : x  Auto Monocyte # : x  Auto Eosinophil # : x  Auto Basophil # : x  Auto Neutrophil % : x  Auto Lymphocyte % : x  Auto Monocyte % : x  Auto Eosinophil % : x  Auto Basophil % : x    07-30    133<L>  |  99  |  40.0<H>  ----------------------------<  129<H>  4.5   |  23.0  |  1.19    Ca    8.3<L>      2018 06:25  Phos  4.0     07-30  Mg     2.0     07-30    TPro  7.3  /  Alb  3.4  /  TBili  1.2  /  DBili  x   /  AST  39  /  ALT  25  /  AlkPhos  83  07-29    PT/INR - ( 2018 06:25 )   PT: 42.0 sec;   INR: 3.72 ratio         PTT - ( 2018 09:37 )  PTT:36.7 sec      Urinalysis Basic - ( 2018 01:54 )    Color: Yellow / Appearance: Clear / S.010 / pH: x  Gluc: x / Ketone: Negative  / Bili: Negative / Urobili: Negative mg/dL   Blood: x / Protein: 30 mg/dL / Nitrite: Negative   Leuk Esterase: Negative / RBC: 6-10 /HPF / WBC Negative   Sq Epi: x / Non Sq Epi: Occasional / Bacteria: x                RADIOLOGY & ADDITIONAL STUDIES (The following images were personally reviewed):

## 2018-07-31 NOTE — PROVIDER CONTACT NOTE (CRITICAL VALUE NOTIFICATION) - SITUATION
Critical value received , MD made aware, order for STAT EKG received and carry out , pt has no complaints of chest pain at this time
INR: 6.21

## 2018-07-31 NOTE — PROGRESS NOTE ADULT - PROBLEM SELECTOR PLAN 1
Possible ischemic colitis. Pt. is presently asymptomatic. Diet to be advanced to low residue. If tolerated consider D/C home later today with OPT f/u with Dr. Curran in 4 to 6 weeks.

## 2018-07-31 NOTE — PROGRESS NOTE ADULT - ATTENDING COMMENTS
Addendum: EKG Reviewed - Afutter with prolonged QT. Medications reviewed; no obvious offending agents. Will replete Mag and repeat EKG. Continue Telemonitoring and Follow up with cardiology.

## 2018-07-31 NOTE — PROGRESS NOTE ADULT - ASSESSMENT
Patient is a 83 yr old male with PMH of CAD s/p CABG, DM II, s/p MVR on coumadin, and s/p PPM who presented to [facility] ED with complaints of bright red blood per rectum since yesterday evening.    1. Lower GI bleed  -patient with 6 week history of diarrhea and now with BRBPR  -Hb 10.4 --> 8.9  -Occult blood positive   -CTA negative   -Protonix IV BID  -Hold Coumadin given supra-therapeutic INR  -Advance to low residue diet today  -Stool studies per GI  -No active bleeding or abdominal pain in the ED; patient currently hemodynamically stable  -Serial H/H  -GI consult appreciated; no indication for inpatient colonoscopy given resolution of symptoms    2. 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 MVR  -on Coumadin but supra-therapeutic INR  -Coumadin on hold but INR rising; therefore will give 1 dose of IV Vitamin K and check INR in AM    4. DM  -hold oral agents  -ISS    5. CAD s/p CABG  -asymptomatic  -TNI trending up; likely demand ischemia in light of GI bleed  -EKG - occasional PVCs  -not on ASA or plavix  -continue metoprolol and statin  -no indication for repeat EKG and ischemic eval as outpatient per cardio  -f/u further cardio recommendations  today    6. Leukocytosis  -likely reactive  -afebrile and denies any active complaints  -UA negative   -CXR negative  -check RVP  -check blood cultures if febrile    7. Hyponatremia - improved  -likely secondary to poor solute intake    -urine studies reviewed  -orthostatics negative  -d/c IVF and encourage PO intake  -strict I/Os, daily weights  -repeat BMP in AM    8. Forgetfulness  -likely secondary to dementia  -needs formal cognitive function testing as outpatient  -check B12, TSH WNL  -f/u RPR  -CT head - negative for acute pathology    9. Chronic pain syndrome  -continue lyrica  -add percocet PRN  -PT evaluation - ANT    DVT prophylaxis - SCDs    SW on board to arrange for placement      Attending Attestation:   Plan discussed with patient, RN and SW Patient is a 83 yr old male with PMH of CAD s/p CABG, DM II, s/p MVR on coumadin, and s/p PPM who presented to [facility] ED with complaints of bright red blood per rectum since yesterday evening.    1. Lower GI bleed  -patient with 6 week history of diarrhea and now with BRBPR  -Hb 10.4 --> 8.9  -Occult blood positive   -CTA negative   -Protonix IV BID  -Hold Coumadin given supra-therapeutic INR  -Advance to low residue diet today  -Stool studies per GI  -No active bleeding or abdominal pain in the ED; patient currently hemodynamically stable  -Serial H/H  -GI consult appreciated; no indication for inpatient colonoscopy given resolution of symptoms    2. 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 MVR  -on Coumadin but supra-therapeutic INR  -Coumadin on hold but INR rising; therefore will give 1 dose of IV Vitamin K and check INR in AM    4. DM  -hold oral agents  -ISS    5. CAD s/p CABG  -asymptomatic  -TNI trending up; likely demand ischemia in light of GI bleed  -EKG - occasional PVCs  -not on ASA or plavix  -continue metoprolol and statin  -no indication for repeat EKG and ischemic eval as outpatient per cardio  -f/u further cardio recommendations  today    6. Leukocytosis  -likely reactive  -afebrile and denies any active complaints  -UA negative   -CXR negative  -check RVP and procal  -check blood cultures if febrile    7. Hyponatremia - improved  -likely secondary to poor solute intake    -urine studies reviewed  -orthostatics negative  -d/c IVF and encourage PO intake  -strict I/Os, daily weights  -repeat BMP in AM    8. Forgetfulness  -likely secondary to dementia  -needs formal cognitive function testing as outpatient  -check B12, TSH WNL  -RPR - negative  -CT head - negative for acute pathology    9. Chronic pain syndrome  -continue lyrica  -percocet PRN  -PT evaluation - ANT    10. Hypomagnesemia  -replete    DVT prophylaxis - SCDs    SW on board to arrange for placement      Attending Attestation:   Plan discussed with patient, RN and SW

## 2018-08-01 ENCOUNTER — OTHER (OUTPATIENT)
Age: 83
End: 2018-08-01

## 2018-08-01 DIAGNOSIS — K62.5 HEMORRHAGE OF ANUS AND RECTUM: ICD-10-CM

## 2018-08-01 PROBLEM — Z00.00 ENCOUNTER FOR PREVENTIVE HEALTH EXAMINATION: Status: ACTIVE | Noted: 2018-08-01

## 2018-08-01 LAB
ANION GAP SERPL CALC-SCNC: 12 MMOL/L — SIGNIFICANT CHANGE UP (ref 5–17)
ANISOCYTOSIS BLD QL: SLIGHT — SIGNIFICANT CHANGE UP
BASOPHILS # BLD AUTO: 0 K/UL — SIGNIFICANT CHANGE UP (ref 0–0.2)
BUN SERPL-MCNC: 35 MG/DL — HIGH (ref 8–20)
CALCIUM SERPL-MCNC: 7.8 MG/DL — LOW (ref 8.6–10.2)
CHLORIDE SERPL-SCNC: 97 MMOL/L — LOW (ref 98–107)
CO2 SERPL-SCNC: 21 MMOL/L — LOW (ref 22–29)
CREAT SERPL-MCNC: 1.29 MG/DL — SIGNIFICANT CHANGE UP (ref 0.5–1.3)
ELLIPTOCYTES BLD QL SMEAR: SLIGHT — SIGNIFICANT CHANGE UP
EOSINOPHIL # BLD AUTO: 0 K/UL — SIGNIFICANT CHANGE UP (ref 0–0.5)
GLUCOSE BLDC GLUCOMTR-MCNC: 167 MG/DL — HIGH (ref 70–99)
GLUCOSE BLDC GLUCOMTR-MCNC: 181 MG/DL — HIGH (ref 70–99)
GLUCOSE BLDC GLUCOMTR-MCNC: 185 MG/DL — HIGH (ref 70–99)
GLUCOSE BLDC GLUCOMTR-MCNC: 224 MG/DL — HIGH (ref 70–99)
GLUCOSE SERPL-MCNC: 169 MG/DL — HIGH (ref 70–115)
HCT VFR BLD CALC: 26.1 % — LOW (ref 42–52)
HGB BLD-MCNC: 8.9 G/DL — LOW (ref 14–18)
HYPOCHROMIA BLD QL: SLIGHT — SIGNIFICANT CHANGE UP
INR BLD: 1.43 RATIO — HIGH (ref 0.88–1.16)
LYMPHOCYTES # BLD AUTO: 0.9 K/UL — LOW (ref 1–4.8)
LYMPHOCYTES # BLD AUTO: 7 % — LOW (ref 20–55)
MACROCYTES BLD QL: SLIGHT — SIGNIFICANT CHANGE UP
MAGNESIUM SERPL-MCNC: 2 MG/DL — SIGNIFICANT CHANGE UP (ref 1.6–2.6)
MCHC RBC-ENTMCNC: 28.7 PG — SIGNIFICANT CHANGE UP (ref 27–31)
MCHC RBC-ENTMCNC: 34.1 G/DL — SIGNIFICANT CHANGE UP (ref 32–36)
MCV RBC AUTO: 84.2 FL — SIGNIFICANT CHANGE UP (ref 80–94)
MICROCYTES BLD QL: SLIGHT — SIGNIFICANT CHANGE UP
MONOCYTES # BLD AUTO: 1 K/UL — HIGH (ref 0–0.8)
MONOCYTES NFR BLD AUTO: 7 % — SIGNIFICANT CHANGE UP (ref 3–10)
NEUTROPHILS # BLD AUTO: 12.5 K/UL — HIGH (ref 1.8–8)
NEUTROPHILS NFR BLD AUTO: 81 % — HIGH (ref 37–73)
NEUTS BAND # BLD: 4 % — SIGNIFICANT CHANGE UP (ref 0–8)
OVALOCYTES BLD QL SMEAR: SLIGHT — SIGNIFICANT CHANGE UP
PHOSPHATE SERPL-MCNC: 3.9 MG/DL — SIGNIFICANT CHANGE UP (ref 2.4–4.7)
PLAT MORPH BLD: NORMAL — SIGNIFICANT CHANGE UP
PLATELET # BLD AUTO: 198 K/UL — SIGNIFICANT CHANGE UP (ref 150–400)
POIKILOCYTOSIS BLD QL AUTO: SLIGHT — SIGNIFICANT CHANGE UP
POTASSIUM SERPL-MCNC: 4.7 MMOL/L — SIGNIFICANT CHANGE UP (ref 3.5–5.3)
POTASSIUM SERPL-SCNC: 4.7 MMOL/L — SIGNIFICANT CHANGE UP (ref 3.5–5.3)
PROTHROM AB SERPL-ACNC: 15.8 SEC — HIGH (ref 9.8–12.7)
RBC # BLD: 3.1 M/UL — LOW (ref 4.6–6.2)
RBC # FLD: 18.8 % — HIGH (ref 11–15.6)
RBC BLD AUTO: ABNORMAL
SCHISTOCYTES BLD QL AUTO: SLIGHT — SIGNIFICANT CHANGE UP
SODIUM SERPL-SCNC: 130 MMOL/L — LOW (ref 135–145)
TROPONIN T SERPL-MCNC: 0.13 NG/ML — HIGH (ref 0–0.06)
VARIANT LYMPHS # BLD: 1 % — SIGNIFICANT CHANGE UP (ref 0–6)
WBC # BLD: 14.4 K/UL — HIGH (ref 4.8–10.8)
WBC # FLD AUTO: 14.4 K/UL — HIGH (ref 4.8–10.8)

## 2018-08-01 PROCEDURE — 99232 SBSQ HOSP IP/OBS MODERATE 35: CPT

## 2018-08-01 PROCEDURE — 93010 ELECTROCARDIOGRAM REPORT: CPT

## 2018-08-01 RX ORDER — WARFARIN SODIUM 2.5 MG/1
4 TABLET ORAL ONCE
Qty: 0 | Refills: 0 | Status: COMPLETED | OUTPATIENT
Start: 2018-08-01 | End: 2018-08-01

## 2018-08-01 RX ADMIN — Medication 1: at 17:35

## 2018-08-01 RX ADMIN — WARFARIN SODIUM 4 MILLIGRAM(S): 2.5 TABLET ORAL at 11:19

## 2018-08-01 RX ADMIN — Medication 75 MILLIGRAM(S): at 13:27

## 2018-08-01 RX ADMIN — PANTOPRAZOLE SODIUM 40 MILLIGRAM(S): 20 TABLET, DELAYED RELEASE ORAL at 17:28

## 2018-08-01 RX ADMIN — OXYCODONE AND ACETAMINOPHEN 1 TABLET(S): 5; 325 TABLET ORAL at 11:10

## 2018-08-01 RX ADMIN — OXYCODONE AND ACETAMINOPHEN 1 TABLET(S): 5; 325 TABLET ORAL at 17:27

## 2018-08-01 RX ADMIN — ATORVASTATIN CALCIUM 40 MILLIGRAM(S): 80 TABLET, FILM COATED ORAL at 21:41

## 2018-08-01 RX ADMIN — Medication 25 MILLIGRAM(S): at 17:27

## 2018-08-01 RX ADMIN — Medication 2: at 11:17

## 2018-08-01 RX ADMIN — Medication 150 MILLIGRAM(S): at 11:11

## 2018-08-01 RX ADMIN — PANTOPRAZOLE SODIUM 40 MILLIGRAM(S): 20 TABLET, DELAYED RELEASE ORAL at 05:39

## 2018-08-01 RX ADMIN — Medication 75 MILLIGRAM(S): at 21:39

## 2018-08-01 RX ADMIN — Medication 25 MILLIGRAM(S): at 05:39

## 2018-08-01 RX ADMIN — Medication 1: at 07:47

## 2018-08-01 NOTE — PROGRESS NOTE ADULT - PROBLEM SELECTOR PLAN 1
No overt bleeding. Rectal shows no blood today  check hemoglobin today  low fiber diet  Supratherapeutic INR- coumadin held.   No need forurgent  inpatient colonoscopy ( pt with MVR on coumdin.  If family wishes colonoscopy to be done, pt would need virtual colon which could be done as outpatient). will sign off.  F/U in office in 4 weeks

## 2018-08-01 NOTE — PROGRESS NOTE ADULT - SUBJECTIVE AND OBJECTIVE BOX
Patient is a 83y old  Male who presents with a chief complaint of BRBPR (29 Jul 2018 20:26)      HPI:  Patient is a 83 yr old male with PMH of CAD s/p CABG, DM II, s/p MVR on coumadin, and s/p PPM - no further diarrhea or rectal bleeding as per patient ( though he is a poor historian). Hgb today pending. No stool studies were collected. Diet advanced to low fiber and pt states he tolerated low fiber.     REVIEW OF SYSTEMS:  Constitutional: No fever, weight loss or fatigue  ENMT:  No difficulty hearing, tinnitus, vertigo; No sinus or throat pain  Respiratory: No cough, wheezing, chills or hemoptysis  Cardiovascular: No chest pain, palpitations, dizziness or leg swelling  Gastrointestinal: No abdominal or epigastric pain. No nausea, vomiting or hematemesis; No diarrhea or constipation. No melena or hematochezia.  Skin: No itching, burning, rashes or lesions   Musculoskeletal: No joint pain or swelling; No muscle, back or extremity pain    PAST MEDICAL & SURGICAL HISTORY:  Chronic pain  Insomnia  Diabetes  Mitral valve replaced  Pacemaker  S/P CABG (coronary artery bypass graft)      FAMILY HISTORY:  No pertinent family history in first degree relatives      SOCIAL HISTORY:  Smoking Status: [ ] Current, [ ] Former, [ ] Never  Pack Years:  [  ] EtOH-no  [  ] IVDA-no    MEDICATIONS:  MEDICATIONS  (STANDING):  allopurinol 150 milliGRAM(s) Oral daily  atorvastatin 40 milliGRAM(s) Oral at bedtime  dextrose 5%. 1000 milliLiter(s) (50 mL/Hr) IV Continuous <Continuous>  dextrose 50% Injectable 12.5 Gram(s) IV Push once  dextrose 50% Injectable 25 Gram(s) IV Push once  dextrose 50% Injectable 25 Gram(s) IV Push once  insulin lispro (HumaLOG) corrective regimen sliding scale   SubCutaneous three times a day before meals  metoprolol tartrate 25 milliGRAM(s) Oral two times a day  pantoprazole  Injectable 40 milliGRAM(s) IV Push two times a day  pregabalin 75 milliGRAM(s) Oral three times a day    MEDICATIONS  (PRN):  acetaminophen   Tablet 650 milliGRAM(s) Oral every 6 hours PRN For Temp greater than 38 C (100.4 F)  dextrose 40% Gel 15 Gram(s) Oral once PRN Blood Glucose LESS THAN 70 milliGRAM(s)/deciliter  glucagon  Injectable 1 milliGRAM(s) IntraMuscular once PRN Glucose LESS THAN 70 milligrams/deciliter  oxyCODONE    5 mG/acetaminophen 325 mG 1 Tablet(s) Oral every 6 hours PRN Severe Pain (7 - 10)      Allergies    No Known Allergies    Intolerances        Vital Signs Last 24 Hrs  T(C): 37.2 (01 Aug 2018 05:02), Max: 37.2 (01 Aug 2018 05:02)  T(F): 98.9 (01 Aug 2018 05:02), Max: 98.9 (01 Aug 2018 05:02)  HR: 60 (01 Aug 2018 05:02) (60 - 70)  BP: 115/61 (01 Aug 2018 05:02) (94/51 - 141/70)  BP(mean): --  RR: 18 (01 Aug 2018 05:02) (17 - 20)  SpO2: 96% (01 Aug 2018 05:02) (93% - 96%)        PHYSICAL EXAM:    General: Well developed; well nourished; in no acute distress  HEENT: MMM, conjunctiva and sclera clear  H- RRR  L- CTA  Gastrointestinal: Soft, non-tender non-distended; Normal bowel sounds; No rebound or guarding  Extremities: Normal range of motion, No clubbing, cyanosis or edema  Neurological: Alert and oriented x3  Skin: Warm and dry. No obvious rash  rectal - this am - no overt bleeding. + hemorrhoids.       LABS:                        8.9    13.4  )-----------( 192      ( 31 Jul 2018 07:44 )             26.6     31 Jul 2018 07:44    132    |  98     |  32.0   ----------------------------<  134    4.8     |  20.0   |  1.27     Ca    7.9        31 Jul 2018 07:44  Phos  3.6       31 Jul 2018 07:44  Mg     1.7       31 Jul 2018 07:44                RADIOLOGY & ADDITIONAL STUDIES: Please note- pt was seen by Dr Curran. I was not able to invalidate my noted

## 2018-08-01 NOTE — PROGRESS NOTE ADULT - SUBJECTIVE AND OBJECTIVE BOX
Pt seen and examined f/u blood per rectum  This morning he denies any abdominal pain, nausea or vomiting. No BMs that he is aware of. Rectal by myself this AM shows form light brown stool with no blood whatsoever. INR very high. H/H essentially stable as of yesterday.    REVIEW OF SYSTEMS:    CONSTITUTIONAL: No fever, weight loss, or fatigue  EYES: No eye pain, visual disturbances, or discharge  ENMT:  No difficulty hearing, tinnitus, vertigo; No sinus or throat pain  RESPIRATORY: No cough, wheezing, chills or hemoptysis; No shortness of breath  CARDIOVASCULAR: No chest pain, palpitations, dizziness, or leg swelling  GASTROINTESTINAL: No abdominal or epigastric pain. No nausea, vomiting, or hematemesis; No diarrhea or constipation. No melena or hematochezia.    MEDICATIONS:  MEDICATIONS  (STANDING):  allopurinol 150 milliGRAM(s) Oral daily  atorvastatin 40 milliGRAM(s) Oral at bedtime  dextrose 5%. 1000 milliLiter(s) (50 mL/Hr) IV Continuous <Continuous>  dextrose 50% Injectable 12.5 Gram(s) IV Push once  dextrose 50% Injectable 25 Gram(s) IV Push once  dextrose 50% Injectable 25 Gram(s) IV Push once  insulin lispro (HumaLOG) corrective regimen sliding scale   SubCutaneous three times a day before meals  metoprolol tartrate 25 milliGRAM(s) Oral two times a day  pantoprazole  Injectable 40 milliGRAM(s) IV Push two times a day  pregabalin 75 milliGRAM(s) Oral three times a day    MEDICATIONS  (PRN):  acetaminophen   Tablet 650 milliGRAM(s) Oral every 6 hours PRN For Temp greater than 38 C (100.4 F)  dextrose 40% Gel 15 Gram(s) Oral once PRN Blood Glucose LESS THAN 70 milliGRAM(s)/deciliter  glucagon  Injectable 1 milliGRAM(s) IntraMuscular once PRN Glucose LESS THAN 70 milligrams/deciliter  oxyCODONE    5 mG/acetaminophen 325 mG 1 Tablet(s) Oral every 6 hours PRN Severe Pain (7 - 10)      Allergies    No Known Allergies    Intolerances        Vital Signs Last 24 Hrs  T(C): 37.2 (01 Aug 2018 05:02), Max: 37.2 (01 Aug 2018 05:02)  T(F): 98.9 (01 Aug 2018 05:02), Max: 98.9 (01 Aug 2018 05:02)  HR: 60 (01 Aug 2018 05:02) (60 - 70)  BP: 115/61 (01 Aug 2018 05:02) (94/51 - 141/70)  BP(mean): --  RR: 18 (01 Aug 2018 05:02) (17 - 20)  SpO2: 96% (01 Aug 2018 05:02) (93% - 96%)      PHYSICAL EXAM:    General: Elderly Well developed; well nourished; in no acute distress  HEENT: MMM, conjunctiva and sclera clear  Gastrointestinal:Abdomen: Soft non-tender non-distended; Normal bowel sounds; No hepatosplenomegaly  Extremities: no cyanosis, clubbing or edema.  Skin: Warm and dry. No obvious rash  Rectal: firm light brown stool with no blood    LABS:      CBC Full  -  ( 31 Jul 2018 07:44 )  WBC Count : 13.4 K/uL  Hemoglobin : 8.9 g/dL  Hematocrit : 26.6 %  Platelet Count - Automated : 192 K/uL  Mean Cell Volume : 83.6 fl  Mean Cell Hemoglobin : 28.0 pg  Mean Cell Hemoglobin Concentration : 33.5 g/dL  Auto Neutrophil # : 11.7 K/uL  Auto Lymphocyte # : 0.6 K/uL  Auto Monocyte # : 1.0 K/uL  Auto Eosinophil # : 0.0 K/uL  Auto Basophil # : 0.0 K/uL  Auto Neutrophil % : 87.1 %  Auto Lymphocyte % : 4.9 %  Auto Monocyte % : 7.6 %  Auto Eosinophil % : 0.0 %  Auto Basophil % : 0.1 %    07-31    132<L>  |  98  |  32.0<H>  ----------------------------<  134<H>  4.8   |  20.0<L>  |  1.27    Ca    7.9<L>      31 Jul 2018 07:44  Phos  3.6     07-31  Mg     1.7     07-31      PT/INR - ( 31 Jul 2018 07:44 )   PT: 70.9 sec;   INR: 6.21 ratio         PTT - ( 31 Jul 2018 07:44 )  PTT:45.8 sec

## 2018-08-01 NOTE — PROGRESS NOTE ADULT - SUBJECTIVE AND OBJECTIVE BOX
CHIEF COMPLAINT/INTERVAL HISTORY:    Patient is a 83y old  Male who presents with a chief complaint of BRBPR (29 Jul 2018 20:26)    SUBJECTIVE & OBJECTIVE: Pt seen and examined at bedside. No overnight events. Denies any active complaints. Sodium down to 130 but patient is drinking plenty of liquids. INR reversed with vitamin K; 1.43 today. Will start coumadin. SW on board to arrange for ANT.    ROS: No chest pain, palpitations, SOB, light headedness, dizziness, headache, nausea/vomiting, fevers/chills, abdominal pain, dysuria or increased urinary frequency.    ICU Vital Signs Last 24 Hrs  T(C): 36.4 (01 Aug 2018 07:52), Max: 37.2 (01 Aug 2018 05:02)  T(F): 97.6 (01 Aug 2018 07:52), Max: 98.9 (01 Aug 2018 05:02)  HR: 61 (01 Aug 2018 07:52) (60 - 70)  BP: 131/61 (01 Aug 2018 07:52) (115/61 - 141/70)  RR: 18 (01 Aug 2018 07:52) (18 - 18)  SpO2: 93% (01 Aug 2018 07:52) (93% - 96%)    MEDICATIONS  (STANDING):  allopurinol 150 milliGRAM(s) Oral daily  atorvastatin 40 milliGRAM(s) Oral at bedtime  dextrose 5%. 1000 milliLiter(s) (50 mL/Hr) IV Continuous <Continuous>  dextrose 50% Injectable 12.5 Gram(s) IV Push once  dextrose 50% Injectable 25 Gram(s) IV Push once  dextrose 50% Injectable 25 Gram(s) IV Push once  insulin lispro (HumaLOG) corrective regimen sliding scale   SubCutaneous three times a day before meals  metoprolol tartrate 25 milliGRAM(s) Oral two times a day  pantoprazole  Injectable 40 milliGRAM(s) IV Push two times a day  pregabalin 75 milliGRAM(s) Oral three times a day    MEDICATIONS  (PRN):  acetaminophen   Tablet 650 milliGRAM(s) Oral every 6 hours PRN For Temp greater than 38 C (100.4 F)  dextrose 40% Gel 15 Gram(s) Oral once PRN Blood Glucose LESS THAN 70 milliGRAM(s)/deciliter  glucagon  Injectable 1 milliGRAM(s) IntraMuscular once PRN Glucose LESS THAN 70 milligrams/deciliter  oxyCODONE    5 mG/acetaminophen 325 mG 1 Tablet(s) Oral every 6 hours PRN Severe Pain (7 - 10)      LABS:                        8.9    14.4  )-----------( 198      ( 01 Aug 2018 08:01 )             26.1     08-01    130<L>  |  97<L>  |  35.0<H>  ----------------------------<  169<H>  4.7   |  21.0<L>  |  1.29    Ca    7.8<L>      01 Aug 2018 08:01  Phos  3.9     08-01  Mg     2.0     08-01      PT/INR - ( 01 Aug 2018 08:01 )   PT: 15.8 sec;   INR: 1.43 ratio         PTT - ( 31 Jul 2018 07:44 )  PTT:45.8 sec      CAPILLARY BLOOD GLUCOSE      POCT Blood Glucose.: 181 mg/dL (01 Aug 2018 13:18)  POCT Blood Glucose.: 224 mg/dL (01 Aug 2018 11:16)  POCT Blood Glucose.: 185 mg/dL (01 Aug 2018 07:46)  POCT Blood Glucose.: 171 mg/dL (31 Jul 2018 18:02)       PHYSICAL EXAM:    GENERAL: elderly male, laying in bed, NAD  HEAD:  Atraumatic, Normocephalic  EYES: EOMI, PERRLA, conjunctiva and sclera clear  ENMT: Moist mucous membranes  NECK: Supple  NERVOUS SYSTEM:  Alert & Oriented X3  CHEST/LUNG: Clear to auscultation bilaterally  HEART: Regular rate and rhythm; +S1/S2  ABDOMEN: Soft, Nontender, Nondistended; Bowel sounds present  EXTREMITIES:  + pedal edema

## 2018-08-01 NOTE — PROGRESS NOTE ADULT - ASSESSMENT
Patient is a 83 yr old male with PMH of CAD s/p CABG, DM II, s/p MVR on coumadin, and s/p PPM who presented to [facility] ED with complaints of bright red blood per rectum since yesterday evening.    1. Lower GI bleed  -patient with 6 week history of diarrhea and recently BRBPR which has resolved  -Hb 10.4 --> 8.9  -Occult blood positive   -CTA negative   -Change Protonix to daily  -Resume Coumadin given subtherapeutic INR  -Tolerating low residue diet  -Stool studies cancelled; no diarrhea  -No active bleeding or abdominal pain in the ED   -GI consult appreciated; no indication for inpatient colonoscopy given resolution of symptoms    2. 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 MVR  -s/p vitamin K on 7/31 given supratherapeutic INR  -INR 1.43 today; resume coumadin    4. DM  -hold oral agents  -ISS    5. CAD s/p CABG  -asymptomatic  -TNI trending up; likely demand ischemia in light of GI bleed  -EKG - occasional PVCs but with QT prolongation yesterday; repeat EKG ordered   -not on ASA or plavix  -continue metoprolol and statin  -no indication for repeat EKG and ischemic eval as outpatient per cardio  -f/u further cardio recommendations     6. Leukocytosis  -likely reactive due to bleed  -afebrile and denies any active complaints  -UA negative   -CXR negative  -RVP negative  -procal elevated but likely reactive  -check blood cultures if febrile    7. Hyponatremia   -likely secondary to poor solute intake  which initially improved with IVF  -however, recent drop in sodium due to increased intake of free water  -fluid restrict  -repeat urine studies   -orthostatics negative  -strict I/Os, daily weights  -repeat BMP in AM    8. Forgetfulness  -likely secondary to dementia  -needs formal cognitive function testing as outpatient  -B12, TSH WNL  -RPR - negative  -CT head - negative for acute pathology    9. Chronic pain syndrome  -continue lyrica  -percocet PRN  -PT evaluation - ANT    10. Hypomagnesemia  -repleted    DVT prophylaxis - resume Coumadin    SW on board to arrange for placement

## 2018-08-01 NOTE — PROGRESS NOTE ADULT - PROBLEM SELECTOR PLAN 1
resolved and probably hemorrhoidal but cant be certain without colonoscopy which he has refused. Currently asymptomatic but INR is very high. Suggest PO pantoprazole for cytoprotection and maximize therapeutic INR status. No plans for further GI intervention at this time. OK to D/C from GI standpoint once INR is somwhat lower. F/U with Dr. Curran as outpatient. resolved and probably hemorrhoidal but cant be certain without colonoscopy which he has refused. Diarrhea, if ever present, has resolved with firm stool in vault. Currently asymptomatic but INR is very high. Suggest PO pantoprazole for cytoprotection and maximize therapeutic INR status. No plans for further GI intervention at this time. OK to D/C from GI standpoint once INR is somwhat lower. F/U with Dr. Curran as outpatient.

## 2018-08-02 LAB
ANION GAP SERPL CALC-SCNC: 11 MMOL/L — SIGNIFICANT CHANGE UP (ref 5–17)
APTT BLD: 33.2 SEC — SIGNIFICANT CHANGE UP (ref 27.5–37.4)
BASOPHILS # BLD AUTO: 0 K/UL — SIGNIFICANT CHANGE UP (ref 0–0.2)
BASOPHILS NFR BLD AUTO: 0.1 % — SIGNIFICANT CHANGE UP (ref 0–2)
BUN SERPL-MCNC: 34 MG/DL — HIGH (ref 8–20)
CALCIUM SERPL-MCNC: 7.9 MG/DL — LOW (ref 8.6–10.2)
CHLORIDE SERPL-SCNC: 98 MMOL/L — SIGNIFICANT CHANGE UP (ref 98–107)
CO2 SERPL-SCNC: 21 MMOL/L — LOW (ref 22–29)
CREAT SERPL-MCNC: 1.12 MG/DL — SIGNIFICANT CHANGE UP (ref 0.5–1.3)
EOSINOPHIL # BLD AUTO: 0.1 K/UL — SIGNIFICANT CHANGE UP (ref 0–0.5)
EOSINOPHIL NFR BLD AUTO: 1.1 % — SIGNIFICANT CHANGE UP (ref 0–5)
GLUCOSE BLDC GLUCOMTR-MCNC: 135 MG/DL — HIGH (ref 70–99)
GLUCOSE BLDC GLUCOMTR-MCNC: 154 MG/DL — HIGH (ref 70–99)
GLUCOSE BLDC GLUCOMTR-MCNC: 172 MG/DL — HIGH (ref 70–99)
GLUCOSE BLDC GLUCOMTR-MCNC: 218 MG/DL — HIGH (ref 70–99)
GLUCOSE SERPL-MCNC: 140 MG/DL — HIGH (ref 70–115)
HCT VFR BLD CALC: 27.9 % — LOW (ref 42–52)
HGB BLD-MCNC: 9.2 G/DL — LOW (ref 14–18)
INR BLD: 1.73 RATIO — HIGH (ref 0.88–1.16)
LYMPHOCYTES # BLD AUTO: 0.8 K/UL — LOW (ref 1–4.8)
LYMPHOCYTES # BLD AUTO: 6.8 % — LOW (ref 20–55)
MAGNESIUM SERPL-MCNC: 2.1 MG/DL — SIGNIFICANT CHANGE UP (ref 1.6–2.6)
MCHC RBC-ENTMCNC: 27.8 PG — SIGNIFICANT CHANGE UP (ref 27–31)
MCHC RBC-ENTMCNC: 33 G/DL — SIGNIFICANT CHANGE UP (ref 32–36)
MCV RBC AUTO: 84.3 FL — SIGNIFICANT CHANGE UP (ref 80–94)
MONOCYTES # BLD AUTO: 0.7 K/UL — SIGNIFICANT CHANGE UP (ref 0–0.8)
MONOCYTES NFR BLD AUTO: 5.7 % — SIGNIFICANT CHANGE UP (ref 3–10)
NEUTROPHILS # BLD AUTO: 10.6 K/UL — HIGH (ref 1.8–8)
NEUTROPHILS NFR BLD AUTO: 86 % — HIGH (ref 37–73)
PHOSPHATE SERPL-MCNC: 3.5 MG/DL — SIGNIFICANT CHANGE UP (ref 2.4–4.7)
PLATELET # BLD AUTO: 200 K/UL — SIGNIFICANT CHANGE UP (ref 150–400)
POTASSIUM SERPL-MCNC: 5.3 MMOL/L — SIGNIFICANT CHANGE UP (ref 3.5–5.3)
POTASSIUM SERPL-SCNC: 5.3 MMOL/L — SIGNIFICANT CHANGE UP (ref 3.5–5.3)
PROTHROM AB SERPL-ACNC: 19.2 SEC — HIGH (ref 9.8–12.7)
RBC # BLD: 3.31 M/UL — LOW (ref 4.6–6.2)
RBC # FLD: 19.4 % — HIGH (ref 11–15.6)
SODIUM SERPL-SCNC: 130 MMOL/L — LOW (ref 135–145)
WBC # BLD: 12.3 K/UL — HIGH (ref 4.8–10.8)
WBC # FLD AUTO: 12.3 K/UL — HIGH (ref 4.8–10.8)

## 2018-08-02 PROCEDURE — 99232 SBSQ HOSP IP/OBS MODERATE 35: CPT

## 2018-08-02 PROCEDURE — 93010 ELECTROCARDIOGRAM REPORT: CPT

## 2018-08-02 RX ORDER — WARFARIN SODIUM 2.5 MG/1
4 TABLET ORAL ONCE
Qty: 0 | Refills: 0 | Status: COMPLETED | OUTPATIENT
Start: 2018-08-02 | End: 2018-08-02

## 2018-08-02 RX ORDER — PANTOPRAZOLE SODIUM 20 MG/1
40 TABLET, DELAYED RELEASE ORAL
Qty: 0 | Refills: 0 | Status: DISCONTINUED | OUTPATIENT
Start: 2018-08-02 | End: 2018-08-03

## 2018-08-02 RX ADMIN — PANTOPRAZOLE SODIUM 40 MILLIGRAM(S): 20 TABLET, DELAYED RELEASE ORAL at 17:17

## 2018-08-02 RX ADMIN — Medication 150 MILLIGRAM(S): at 17:18

## 2018-08-02 RX ADMIN — Medication 2: at 17:19

## 2018-08-02 RX ADMIN — WARFARIN SODIUM 4 MILLIGRAM(S): 2.5 TABLET ORAL at 22:47

## 2018-08-02 RX ADMIN — Medication 75 MILLIGRAM(S): at 22:48

## 2018-08-02 RX ADMIN — OXYCODONE AND ACETAMINOPHEN 1 TABLET(S): 5; 325 TABLET ORAL at 22:00

## 2018-08-02 RX ADMIN — OXYCODONE AND ACETAMINOPHEN 1 TABLET(S): 5; 325 TABLET ORAL at 00:50

## 2018-08-02 RX ADMIN — Medication 25 MILLIGRAM(S): at 17:17

## 2018-08-02 RX ADMIN — Medication 1: at 08:15

## 2018-08-02 RX ADMIN — Medication 25 MILLIGRAM(S): at 05:54

## 2018-08-02 RX ADMIN — PANTOPRAZOLE SODIUM 40 MILLIGRAM(S): 20 TABLET, DELAYED RELEASE ORAL at 05:54

## 2018-08-02 RX ADMIN — OXYCODONE AND ACETAMINOPHEN 1 TABLET(S): 5; 325 TABLET ORAL at 21:06

## 2018-08-02 RX ADMIN — ATORVASTATIN CALCIUM 40 MILLIGRAM(S): 80 TABLET, FILM COATED ORAL at 22:48

## 2018-08-02 RX ADMIN — Medication 75 MILLIGRAM(S): at 05:54

## 2018-08-02 NOTE — PROGRESS NOTE ADULT - ASSESSMENT
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 No further episodes of bleeding in the hospital. Cleared for discharge from GI perspective with outpatient colonsocopy. Hospital course complicated by hyponatremia which initially appropriately corrected with NS but has started to decrease due to increased intake of free water; patient placed on free water restriction but not adhering. Also, patient developed QT prolongation; no offending agents or electrolyte issues identified. Discussed with [facility] cardiology today; patient is paced therefore no further cardiac intervention needed. Patient is now medically cleared for discharge; SW on board to arrange for ANT.    1. Lower GI bleed - resolved  -patient with 6 week history of diarrhea and recently BRBPR which has resolved  -Hb 10.4 --> 9.2  -Occult blood positive   -CTA negative   -Change Protonix to daily  -Coumadin resumed and INR appropriately increase  -Tolerating low residue diet  -Stool studies cancelled; no diarrhea  -No active bleeding or abdominal pain    -GI consult appreciated; no indication for inpatient colonoscopy given resolution of symptoms    2. 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 MVR  -s/p vitamin K on 7/31 given supratherapeutic INR  -INR 1.7 today; continue coumadin to keep INR 2.5-3.5    4. DM  -hold oral agents  -ISS    5. CAD s/p CABG  -asymptomatic  -elevated TNI likely demand ischemia due to GI bleed  -EKG - occasional PVCs but with QT prolongation; paced rhythm   -not on ASA or plavix  -continue metoprolol and statin  -no indication for repeat EKG and ischemic eval as outpatient per cardio  -discussed with Dr. Alvarado today; cleared for discharge from cardiac prespective    6. Leukocytosis  -likely reactive due to bleed  -afebrile and denies any active complaints  -UA negative   -CXR negative  -RVP negative  -procal elevated but likely reactive  -check blood cultures if febrile    7. Hyponatremia   -likely secondary to poor solute intake  which initially improved with IVF  -however, recent drop in sodium due to increased intake of free water  -fluid restrict (patient with over 2 liters of fluid at bedside today)  -repeat urine studies   -orthostatics negative  -strict I/Os, daily weights    8. Forgetfulness  -likely secondary to dementia  -needs formal cognitive function testing as outpatient  -B12, TSH WNL  -RPR - negative  -CT head - negative for acute pathology    9. Chronic pain syndrome  -continue lyrica  -percocet PRN  -PT evaluation - ANT    DVT prophylaxis - Coumadin    Medically cleared for discharge. SW on board to arrange for placement --> ANT. Wife updated.      Attending Attestation:   Plan discussed with patient, RN, SW

## 2018-08-02 NOTE — PROGRESS NOTE ADULT - SUBJECTIVE AND OBJECTIVE BOX
CHIEF COMPLAINT/INTERVAL HISTORY:    Patient is a 83y old  Male who presents with a chief complaint of BRBPR (29 Jul 2018 20:26)    SUBJECTIVE & OBJECTIVE: Pt seen and examined at bedside. No overnight events. Denies any active complaints. Still drinking plenty of fluids therefore sodium remains at 130. INR improving. QTc prolonged but discussed with cardiology; EKG paced therefore patient is stable for discharge. SW on board to arrange for ANT.     ROS: No chest pain, palpitations, SOB, light headedness, dizziness, headache, nausea/vomiting, fevers/chills, abdominal pain, dysuria or increased urinary frequency.    ICU Vital Signs Last 24 Hrs  T(C): 36.4 (02 Aug 2018 07:41), Max: 36.9 (01 Aug 2018 15:54)  T(F): 97.6 (02 Aug 2018 07:41), Max: 98.4 (01 Aug 2018 15:54)  HR: 82 (02 Aug 2018 12:25) (59 - 82)  BP: 105/48 (02 Aug 2018 07:41) (103/58 - 118/55)  RR: 18 (02 Aug 2018 07:41) (18 - 20)  SpO2: 93% (02 Aug 2018 12:25) (93% - 97%)    MEDICATIONS  (STANDING):  allopurinol 150 milliGRAM(s) Oral daily  atorvastatin 40 milliGRAM(s) Oral at bedtime  dextrose 5%. 1000 milliLiter(s) (50 mL/Hr) IV Continuous <Continuous>  dextrose 50% Injectable 12.5 Gram(s) IV Push once  dextrose 50% Injectable 25 Gram(s) IV Push once  dextrose 50% Injectable 25 Gram(s) IV Push once  insulin lispro (HumaLOG) corrective regimen sliding scale   SubCutaneous three times a day before meals  metoprolol tartrate 25 milliGRAM(s) Oral two times a day  pantoprazole  Injectable 40 milliGRAM(s) IV Push two times a day  pregabalin 75 milliGRAM(s) Oral three times a day  warfarin 4 milliGRAM(s) Oral once    MEDICATIONS  (PRN):  acetaminophen   Tablet 650 milliGRAM(s) Oral every 6 hours PRN For Temp greater than 38 C (100.4 F)  dextrose 40% Gel 15 Gram(s) Oral once PRN Blood Glucose LESS THAN 70 milliGRAM(s)/deciliter  glucagon  Injectable 1 milliGRAM(s) IntraMuscular once PRN Glucose LESS THAN 70 milligrams/deciliter  oxyCODONE    5 mG/acetaminophen 325 mG 1 Tablet(s) Oral every 6 hours PRN Severe Pain (7 - 10)      LABS:                        9.2    12.3  )-----------( 200      ( 02 Aug 2018 08:23 )             27.9     08-02    130<L>  |  98  |  34.0<H>  ----------------------------<  140<H>  5.3   |  21.0<L>  |  1.12    Ca    7.9<L>      02 Aug 2018 08:23  Phos  3.5     08-02  Mg     2.1     08-02      PT/INR - ( 02 Aug 2018 08:23 )   PT: 19.2 sec;   INR: 1.73 ratio         PTT - ( 02 Aug 2018 08:23 )  PTT:33.2 sec      CAPILLARY BLOOD GLUCOSE      POCT Blood Glucose.: 135 mg/dL (02 Aug 2018 11:43)  POCT Blood Glucose.: 154 mg/dL (02 Aug 2018 07:48)  POCT Blood Glucose.: 167 mg/dL (01 Aug 2018 17:34)  POCT Blood Glucose.: 181 mg/dL (01 Aug 2018 13:18)      PHYSICAL EXAM:    GENERAL: elderly male, laying in bed, NAD  HEAD:  Atraumatic, Normocephalic  EYES: EOMI, PERRLA, conjunctiva and sclera clear  ENMT: Moist mucous membranes  NECK: Supple  NERVOUS SYSTEM:  Alert & Oriented X3  CHEST/LUNG: Clear to auscultation bilaterally  HEART: Irregular; +S1/S2  ABDOMEN: Soft, Nontender, Nondistended; Bowel sounds present  EXTREMITIES:  + pedal edema

## 2018-08-03 ENCOUNTER — TRANSCRIPTION ENCOUNTER (OUTPATIENT)
Age: 83
End: 2018-08-03

## 2018-08-03 VITALS
SYSTOLIC BLOOD PRESSURE: 112 MMHG | HEART RATE: 62 BPM | OXYGEN SATURATION: 94 % | DIASTOLIC BLOOD PRESSURE: 62 MMHG | TEMPERATURE: 98 F | RESPIRATION RATE: 18 BRPM

## 2018-08-03 LAB
ANION GAP SERPL CALC-SCNC: 8 MMOL/L — SIGNIFICANT CHANGE UP (ref 5–17)
APTT BLD: 35.8 SEC — SIGNIFICANT CHANGE UP (ref 27.5–37.4)
BASOPHILS # BLD AUTO: 0 K/UL — SIGNIFICANT CHANGE UP (ref 0–0.2)
BASOPHILS NFR BLD AUTO: 0.1 % — SIGNIFICANT CHANGE UP (ref 0–2)
BUN SERPL-MCNC: 31 MG/DL — HIGH (ref 8–20)
CALCIUM SERPL-MCNC: 7.7 MG/DL — LOW (ref 8.6–10.2)
CHLORIDE SERPL-SCNC: 98 MMOL/L — SIGNIFICANT CHANGE UP (ref 98–107)
CO2 SERPL-SCNC: 24 MMOL/L — SIGNIFICANT CHANGE UP (ref 22–29)
CREAT SERPL-MCNC: 1.27 MG/DL — SIGNIFICANT CHANGE UP (ref 0.5–1.3)
EOSINOPHIL # BLD AUTO: 0.1 K/UL — SIGNIFICANT CHANGE UP (ref 0–0.5)
EOSINOPHIL NFR BLD AUTO: 0.5 % — SIGNIFICANT CHANGE UP (ref 0–5)
GLUCOSE BLDC GLUCOMTR-MCNC: 151 MG/DL — HIGH (ref 70–99)
GLUCOSE BLDC GLUCOMTR-MCNC: 167 MG/DL — HIGH (ref 70–99)
GLUCOSE BLDC GLUCOMTR-MCNC: 227 MG/DL — HIGH (ref 70–99)
GLUCOSE SERPL-MCNC: 131 MG/DL — HIGH (ref 70–115)
HCT VFR BLD CALC: 26.3 % — LOW (ref 42–52)
HGB BLD-MCNC: 8.8 G/DL — LOW (ref 14–18)
INR BLD: 2.5 RATIO — HIGH (ref 0.88–1.16)
LYMPHOCYTES # BLD AUTO: 1.1 K/UL — SIGNIFICANT CHANGE UP (ref 1–4.8)
LYMPHOCYTES # BLD AUTO: 8.9 % — LOW (ref 20–55)
MAGNESIUM SERPL-MCNC: 1.9 MG/DL — SIGNIFICANT CHANGE UP (ref 1.8–2.6)
MCHC RBC-ENTMCNC: 28 PG — SIGNIFICANT CHANGE UP (ref 27–31)
MCHC RBC-ENTMCNC: 33.5 G/DL — SIGNIFICANT CHANGE UP (ref 32–36)
MCV RBC AUTO: 83.8 FL — SIGNIFICANT CHANGE UP (ref 80–94)
MONOCYTES # BLD AUTO: 0.8 K/UL — SIGNIFICANT CHANGE UP (ref 0–0.8)
MONOCYTES NFR BLD AUTO: 6.3 % — SIGNIFICANT CHANGE UP (ref 3–10)
NEUTROPHILS # BLD AUTO: 10 K/UL — HIGH (ref 1.8–8)
NEUTROPHILS NFR BLD AUTO: 83.9 % — HIGH (ref 37–73)
PHOSPHATE SERPL-MCNC: 3.2 MG/DL — SIGNIFICANT CHANGE UP (ref 2.4–4.7)
PLATELET # BLD AUTO: 186 K/UL — SIGNIFICANT CHANGE UP (ref 150–400)
POTASSIUM SERPL-MCNC: 4.6 MMOL/L — SIGNIFICANT CHANGE UP (ref 3.5–5.3)
POTASSIUM SERPL-SCNC: 4.6 MMOL/L — SIGNIFICANT CHANGE UP (ref 3.5–5.3)
PROTHROM AB SERPL-ACNC: 28 SEC — HIGH (ref 9.8–12.7)
RBC # BLD: 3.14 M/UL — LOW (ref 4.6–6.2)
RBC # FLD: 19.6 % — HIGH (ref 11–15.6)
SODIUM SERPL-SCNC: 130 MMOL/L — LOW (ref 135–145)
WBC # BLD: 11.9 K/UL — HIGH (ref 4.8–10.8)
WBC # FLD AUTO: 11.9 K/UL — HIGH (ref 4.8–10.8)

## 2018-08-03 PROCEDURE — 99239 HOSP IP/OBS DSCHRG MGMT >30: CPT

## 2018-08-03 RX ORDER — WARFARIN SODIUM 2.5 MG/1
2 TABLET ORAL ONCE
Qty: 0 | Refills: 0 | Status: DISCONTINUED | OUTPATIENT
Start: 2018-08-03 | End: 2018-08-03

## 2018-08-03 RX ORDER — PANTOPRAZOLE SODIUM 20 MG/1
1 TABLET, DELAYED RELEASE ORAL
Qty: 0 | Refills: 0 | COMMUNITY
Start: 2018-08-03

## 2018-08-03 RX ADMIN — PANTOPRAZOLE SODIUM 40 MILLIGRAM(S): 20 TABLET, DELAYED RELEASE ORAL at 05:12

## 2018-08-03 RX ADMIN — Medication 1: at 08:23

## 2018-08-03 RX ADMIN — OXYCODONE AND ACETAMINOPHEN 1 TABLET(S): 5; 325 TABLET ORAL at 10:57

## 2018-08-03 RX ADMIN — Medication 150 MILLIGRAM(S): at 11:04

## 2018-08-03 RX ADMIN — Medication 75 MILLIGRAM(S): at 05:13

## 2018-08-03 RX ADMIN — Medication 2: at 11:05

## 2018-08-03 RX ADMIN — Medication 75 MILLIGRAM(S): at 14:33

## 2018-08-03 NOTE — PROGRESS NOTE ADULT - ASSESSMENT
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 No further episodes of bleeding in the hospital. Cleared for discharge from GI perspective with outpatient colonsocopy. Hospital course complicated by hyponatremia which initially appropriately corrected with NS but has started to decrease due to increased intake of free water; patient placed on free water restriction but not adhering. Also, patient developed QT prolongation; no offending agents or electrolyte issues identified. Discussed with [facility] cardiology today; patient is paced therefore no further cardiac intervention needed. Patient is now medically cleared for discharge; SW on board to arrange for ANT.    1. Lower GI bleed - resolved  -patient with 6 week history of diarrhea and recently BRBPR which has resolved  -Hb 10.4 --> 9.2  -Occult blood positive   -CTA negative   -Change Protonix to daily  -Coumadin resumed and INR appropriately increase  -Tolerating low residue diet  -Stool studies cancelled; no diarrhea  -No active bleeding or abdominal pain    -GI consult appreciated; no indication for inpatient colonoscopy given resolution of symptoms    2. 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 MVR  -s/p vitamin K on 7/31 given supratherapeutic INR  -continue coumadin to keep INR 2.5-3.5    4. DM  -hold oral agents  -ISS    5. CAD s/p CABG  -asymptomatic  -elevated TNI likely demand ischemia due to GI bleed  -EKG - occasional PVCs but with QT prolongation; paced rhythm   -not on ASA or plavix  -continue metoprolol and statin  -no indication for repeat EKG and ischemic eval as outpatient per cardio  -discussed with Dr. Alvarado ; cleared for discharge from cardiac perspective    6. Leukocytosis  -likely reactive due to bleed  -afebrile and denies any active complaints  -UA negative   -CXR negative  -RVP negative  -procal elevated but likely reactive    7. Hyponatremia   -likely secondary to poor solute intake  which initially improved with IVF  -however, recent drop in sodium due to increased intake of free water  -fluid restrict (patient with over 2 liters of fluid at bedside today)  -repeat urine studies   -orthostatics negative  -strict I/Os, daily weights    8. Forgetfulness  -likely secondary to dementia  -needs formal cognitive function testing as outpatient  -B12, TSH WNL  -RPR - negative  -CT head - negative for acute pathology    9. Chronic pain syndrome  -continue lyrica  -percocet PRN  -PT evaluation - ANT    DVT prophylaxis - Coumadin    Medically cleared for discharge. SW on board to arrange for placement --> ANT.

## 2018-08-03 NOTE — DISCHARGE NOTE ADULT - CARE PROVIDER_API CALL
Juju Curran (DO), Gastroenterology  [address]  [place] [address]  Phone: [phone]  Fax: [phone]    Olegario Alvarado), Cardiovascular Disease; Internal Medicine  [address]  [place] [address]  Phone: [phone]  Fax: [phone]

## 2018-08-03 NOTE — DISCHARGE NOTE ADULT - CARE PLAN
Principal Discharge DX:	Gastrointestinal hemorrhage, unspecified gastrointestinal hemorrhage type  Goal:	monitor for recurrent symptoms  Assessment and plan of treatment:	PO protonix to continue  Secondary Diagnosis:	Mitral valve replaced  Assessment and plan of treatment:	Continue coumadin. Monitor pt/inr  INR goal of 2.5-3.5

## 2018-08-03 NOTE — DISCHARGE NOTE ADULT - HOSPITAL COURSE
Patient is a 83 yr old male with PMH of CAD s/p CABG, DM II, s/p MVR on coumadin, and s/p PPM who presented to [facility] ED with complaints of bright red blood per rectum and found to have a supratherapeutic INR requiring reversal with IV Vitamin K. No further episodes of bleeding in the hospital. Cleared for discharge from GI perspective with outpatient colonsocopy. Hospital course complicated by hyponatremia which initially appropriately corrected with NS but has started to decrease due to increased intake of free water; patient placed on free water restriction but not adhering. Also, patient developed QT prolongation; no offending agents or electrolyte issues identified. Discussed with [facility] cardiology today; patient is paced therefore no further cardiac intervention needed. Patient is now medically cleared for discharge; SW on board to arrange for ANT.    50 mins  spent coordinating care and discharge. Patient is a 83 yr old male with PMH of CAD s/p CABG, DM II, s/p MVR on coumadin, and s/p PPM who presented to [facility] ED with complaints of bright red blood per rectum and found to have a supratherapeutic INR requiring reversal with IV Vitamin K. No further episodes of bleeding in the hospital. Cleared for discharge from GI perspective with outpatient colonsocopy. Hospital course complicated by hyponatremia which initially appropriately corrected with NS but has started to decrease due to increased intake of free water; patient placed on free water restriction but not adhering. Also, patient developed QT prolongation; no offending agents or electrolyte issues identified. Discussed with [facility] cardiology today; patient is paced therefore no further cardiac intervention needed. Patient is now medically cleared for discharge; SW on board to arrange for ANT.    50 mins  spent coordinating care and discharge. Plan discussed with patient and his wife in detail

## 2018-08-03 NOTE — PROGRESS NOTE ADULT - SUBJECTIVE AND OBJECTIVE BOX
CC: Hip pain    INTERVAL HPI/OVERNIGHT EVENTS:  Patient seen and examined, complaints of bilateral hip pain which he has had for a few months now. Denies abdominal pain, nausea or vomiting. No urinary or bowel complaints.     Vital Signs Last 24 Hrs  T(C): 36.6 (03 Aug 2018 08:02), Max: 37.4 (02 Aug 2018 23:10)  T(F): 97.9 (03 Aug 2018 08:02), Max: 99.4 (02 Aug 2018 23:10)  HR: 86 (03 Aug 2018 08:26) (55 - 86)  BP: 107/53 (03 Aug 2018 08:02) (107/53 - 118/56)  BP(mean): --  RR: 18 (03 Aug 2018 08:02) (18 - 18)  SpO2: 94% (03 Aug 2018 08:26) (92% - 97%)    PHYSICAL EXAM:    GENERAL: NAD, AOX3  HEAD:  Atraumatic, Normocephalic  EYES: EOMI, PERRLA, conjunctiva and sclera clear  ENMT: Moist mucous membranes  CHEST/LUNG: Clear to auscultation bilaterally; No rales, rhonchi, wheezing, or rubs  HEART: Regular rate and rhythm; No murmurs, rubs, or gallops  ABDOMEN: Soft, Nontender, Nondistended; Bowel sounds present  EXTREMITIES:  2+ Peripheral Pulses, No clubbing, cyanosis, or edema        MEDICATIONS  (STANDING):  allopurinol 150 milliGRAM(s) Oral daily  atorvastatin 40 milliGRAM(s) Oral at bedtime  dextrose 5%. 1000 milliLiter(s) (50 mL/Hr) IV Continuous <Continuous>  dextrose 50% Injectable 12.5 Gram(s) IV Push once  dextrose 50% Injectable 25 Gram(s) IV Push once  dextrose 50% Injectable 25 Gram(s) IV Push once  insulin lispro (HumaLOG) corrective regimen sliding scale   SubCutaneous three times a day before meals  metoprolol tartrate 25 milliGRAM(s) Oral two times a day  pantoprazole    Tablet 40 milliGRAM(s) Oral before breakfast  pregabalin 75 milliGRAM(s) Oral three times a day    MEDICATIONS  (PRN):  acetaminophen   Tablet 650 milliGRAM(s) Oral every 6 hours PRN For Temp greater than 38 C (100.4 F)  dextrose 40% Gel 15 Gram(s) Oral once PRN Blood Glucose LESS THAN 70 milliGRAM(s)/deciliter  glucagon  Injectable 1 milliGRAM(s) IntraMuscular once PRN Glucose LESS THAN 70 milligrams/deciliter  oxyCODONE    5 mG/acetaminophen 325 mG 1 Tablet(s) Oral every 6 hours PRN Severe Pain (7 - 10)      Allergies    No Known Allergies    Intolerances          LABS:                          8.8    11.9  )-----------( 186      ( 03 Aug 2018 07:42 )             26.3     08-03    130<L>  |  98  |  31.0<H>  ----------------------------<  131<H>  4.6   |  24.0  |  1.27    Ca    7.7<L>      03 Aug 2018 07:42  Phos  3.2     08-03  Mg     1.9     08-03      PT/INR - ( 03 Aug 2018 07:42 )   PT: 28.0 sec;   INR: 2.50 ratio         PTT - ( 03 Aug 2018 07:42 )  PTT:35.8 sec      RADIOLOGY & ADDITIONAL TESTS:

## 2018-08-03 NOTE — PROGRESS NOTE ADULT - PROVIDER SPECIALTY LIST ADULT
Gastroenterology
Gastroenterology
Hospitalist
Hospitalist
Internal Medicine
Hospitalist
Hospitalist
Gastroenterology

## 2018-08-03 NOTE — DISCHARGE NOTE ADULT - MEDICATION SUMMARY - MEDICATIONS TO TAKE
I will START or STAY ON the medications listed below when I get home from the hospital:    lisinopril 2.5 mg oral tablet  -- 1 tab(s) by mouth once a day (in the morning)  -- Indication: For hypertension    warfarin 4 mg oral tablet  -- 1 tab(s) by mouth once a day  -- Indication: For MVR    Lyrica 75 mg oral capsule  --  by mouth 3 times a day  -- Indication: For neuropathy    metFORMIN 500 mg oral tablet  -- 1 tab(s) by mouth 2 times a day  -- Indication: For diabetes    allopurinol  -- 150 milligram(s) by mouth once a day (in the morning)  -- Indication: For Gout    atorvastatin 40 mg oral tablet  -- 1 tab(s) by mouth once a day (in the morning)  -- Indication: For hyperlipidemia    metoprolol tartrate 25 mg oral tablet  -- 1 tab(s) by mouth 2 times a day  -- Indication: For hypertension    pantoprazole 40 mg oral delayed release tablet  -- 1 tab(s) by mouth once a day (before a meal)  -- Indication: For Gerd

## 2018-08-03 NOTE — DISCHARGE NOTE ADULT - PLAN OF CARE
monitor for recurrent symptoms PO protonix to continue Continue coumadin. Monitor pt/inr  INR goal of 2.5-3.5

## 2018-08-03 NOTE — DISCHARGE NOTE ADULT - PATIENT PORTAL LINK FT
You can access the [facility] Patient Portal, offered by [facility], by registering with the following website: [email or web address]

## 2018-08-05 PROCEDURE — 86901 BLOOD TYPING SEROLOGIC RH(D): CPT

## 2018-08-05 PROCEDURE — 82272 OCCULT BLD FECES 1-3 TESTS: CPT

## 2018-08-05 PROCEDURE — 83036 HEMOGLOBIN GLYCOSYLATED A1C: CPT

## 2018-08-05 PROCEDURE — 84480 ASSAY TRIIODOTHYRONINE (T3): CPT

## 2018-08-05 PROCEDURE — 99285 EMERGENCY DEPT VISIT HI MDM: CPT | Mod: 25

## 2018-08-05 PROCEDURE — 87581 M.PNEUMON DNA AMP PROBE: CPT

## 2018-08-05 PROCEDURE — 84100 ASSAY OF PHOSPHORUS: CPT

## 2018-08-05 PROCEDURE — 85027 COMPLETE CBC AUTOMATED: CPT

## 2018-08-05 PROCEDURE — 85730 THROMBOPLASTIN TIME PARTIAL: CPT

## 2018-08-05 PROCEDURE — 96360 HYDRATION IV INFUSION INIT: CPT

## 2018-08-05 PROCEDURE — 84550 ASSAY OF BLOOD/URIC ACID: CPT

## 2018-08-05 PROCEDURE — 84436 ASSAY OF TOTAL THYROXINE: CPT

## 2018-08-05 PROCEDURE — 82962 GLUCOSE BLOOD TEST: CPT

## 2018-08-05 PROCEDURE — 84484 ASSAY OF TROPONIN QUANT: CPT

## 2018-08-05 PROCEDURE — 71045 X-RAY EXAM CHEST 1 VIEW: CPT

## 2018-08-05 PROCEDURE — 93005 ELECTROCARDIOGRAM TRACING: CPT

## 2018-08-05 PROCEDURE — 87633 RESP VIRUS 12-25 TARGETS: CPT

## 2018-08-05 PROCEDURE — 36415 COLL VENOUS BLD VENIPUNCTURE: CPT

## 2018-08-05 PROCEDURE — 85610 PROTHROMBIN TIME: CPT

## 2018-08-05 PROCEDURE — 83605 ASSAY OF LACTIC ACID: CPT

## 2018-08-05 PROCEDURE — 97163 PT EVAL HIGH COMPLEX 45 MIN: CPT

## 2018-08-05 PROCEDURE — 86850 RBC ANTIBODY SCREEN: CPT

## 2018-08-05 PROCEDURE — 87486 CHLMYD PNEUM DNA AMP PROBE: CPT

## 2018-08-05 PROCEDURE — 86900 BLOOD TYPING SEROLOGIC ABO: CPT

## 2018-08-05 PROCEDURE — 84145 PROCALCITONIN (PCT): CPT

## 2018-08-05 PROCEDURE — 86780 TREPONEMA PALLIDUM: CPT

## 2018-08-05 PROCEDURE — 80053 COMPREHEN METABOLIC PANEL: CPT

## 2018-08-05 PROCEDURE — 82607 VITAMIN B-12: CPT

## 2018-08-05 PROCEDURE — 87798 DETECT AGENT NOS DNA AMP: CPT

## 2018-08-05 PROCEDURE — 82009 KETONE BODYS QUAL: CPT

## 2018-08-05 PROCEDURE — 74174 CTA ABD&PLVS W/CONTRAST: CPT

## 2018-08-05 PROCEDURE — 85014 HEMATOCRIT: CPT

## 2018-08-05 PROCEDURE — 84443 ASSAY THYROID STIM HORMONE: CPT

## 2018-08-05 PROCEDURE — 83735 ASSAY OF MAGNESIUM: CPT

## 2018-08-05 PROCEDURE — 80048 BASIC METABOLIC PNL TOTAL CA: CPT

## 2018-08-05 PROCEDURE — 82570 ASSAY OF URINE CREATININE: CPT

## 2018-08-05 PROCEDURE — 70450 CT HEAD/BRAIN W/O DYE: CPT

## 2018-08-05 PROCEDURE — 81001 URINALYSIS AUTO W/SCOPE: CPT

## 2018-08-05 PROCEDURE — 85018 HEMOGLOBIN: CPT

## 2018-08-05 PROCEDURE — 83935 ASSAY OF URINE OSMOLALITY: CPT

## 2018-08-05 PROCEDURE — 84300 ASSAY OF URINE SODIUM: CPT

## 2018-08-05 PROCEDURE — 82533 TOTAL CORTISOL: CPT

## 2018-08-05 PROCEDURE — 83930 ASSAY OF BLOOD OSMOLALITY: CPT

## 2018-08-17 ENCOUNTER — INPATIENT (INPATIENT)
Facility: HOSPITAL | Age: 83
LOS: 9 days | Discharge: NURSING, REHAB OR LONG-TERM CARE | DRG: 811 | End: 2018-08-27
Attending: INTERNAL MEDICINE | Admitting: GENERAL ACUTE CARE HOSPITAL
Payer: MEDICARE

## 2018-08-17 VITALS — HEIGHT: 68 IN | WEIGHT: 154.98 LBS

## 2018-08-17 DIAGNOSIS — Z95.1 PRESENCE OF AORTOCORONARY BYPASS GRAFT: Chronic | ICD-10-CM

## 2018-08-17 DIAGNOSIS — N17.9 ACUTE KIDNEY FAILURE, UNSPECIFIED: ICD-10-CM

## 2018-08-17 DIAGNOSIS — I25.10 ATHEROSCLEROTIC HEART DISEASE OF NATIVE CORONARY ARTERY WITHOUT ANGINA PECTORIS: ICD-10-CM

## 2018-08-17 DIAGNOSIS — E11.9 TYPE 2 DIABETES MELLITUS WITHOUT COMPLICATIONS: ICD-10-CM

## 2018-08-17 DIAGNOSIS — F03.90 UNSPECIFIED DEMENTIA WITHOUT BEHAVIORAL DISTURBANCE: ICD-10-CM

## 2018-08-17 DIAGNOSIS — Z95.2 PRESENCE OF PROSTHETIC HEART VALVE: ICD-10-CM

## 2018-08-17 DIAGNOSIS — L89.159 PRESSURE ULCER OF SACRAL REGION, UNSPECIFIED STAGE: ICD-10-CM

## 2018-08-17 DIAGNOSIS — D64.9 ANEMIA, UNSPECIFIED: ICD-10-CM

## 2018-08-17 LAB
ALBUMIN SERPL ELPH-MCNC: 2.7 G/DL — LOW (ref 3.3–5.2)
ALP SERPL-CCNC: 76 U/L — SIGNIFICANT CHANGE UP (ref 40–120)
ALT FLD-CCNC: 12 U/L — SIGNIFICANT CHANGE UP
ANION GAP SERPL CALC-SCNC: 14 MMOL/L — SIGNIFICANT CHANGE UP (ref 5–17)
APTT BLD: 39.8 SEC — HIGH (ref 27.5–37.4)
AST SERPL-CCNC: 35 U/L — SIGNIFICANT CHANGE UP
BASOPHILS # BLD AUTO: 0 K/UL — SIGNIFICANT CHANGE UP (ref 0–0.2)
BASOPHILS NFR BLD AUTO: 0.3 % — SIGNIFICANT CHANGE UP (ref 0–2)
BILIRUB SERPL-MCNC: 1 MG/DL — SIGNIFICANT CHANGE UP (ref 0.4–2)
BUN SERPL-MCNC: 43 MG/DL — HIGH (ref 8–20)
CALCIUM SERPL-MCNC: 8.2 MG/DL — LOW (ref 8.6–10.2)
CHLORIDE SERPL-SCNC: 100 MMOL/L — SIGNIFICANT CHANGE UP (ref 98–107)
CO2 SERPL-SCNC: 24 MMOL/L — SIGNIFICANT CHANGE UP (ref 22–29)
CREAT SERPL-MCNC: 1.71 MG/DL — HIGH (ref 0.5–1.3)
EOSINOPHIL # BLD AUTO: 0 K/UL — SIGNIFICANT CHANGE UP (ref 0–0.5)
EOSINOPHIL NFR BLD AUTO: 0.2 % — SIGNIFICANT CHANGE UP (ref 0–5)
GLUCOSE SERPL-MCNC: 108 MG/DL — SIGNIFICANT CHANGE UP (ref 70–115)
HCT VFR BLD CALC: 20.7 % — CRITICAL LOW (ref 42–52)
HGB BLD-MCNC: 6.8 G/DL — CRITICAL LOW (ref 14–18)
INR BLD: 3.27 RATIO — HIGH (ref 0.88–1.16)
LACTATE BLDV-MCNC: 2.7 MMOL/L — HIGH (ref 0.5–2)
LYMPHOCYTES # BLD AUTO: 0.8 K/UL — LOW (ref 1–4.8)
LYMPHOCYTES # BLD AUTO: 13.2 % — LOW (ref 20–55)
MCHC RBC-ENTMCNC: 29.3 PG — SIGNIFICANT CHANGE UP (ref 27–31)
MCHC RBC-ENTMCNC: 32.9 G/DL — SIGNIFICANT CHANGE UP (ref 32–36)
MCV RBC AUTO: 89.2 FL — SIGNIFICANT CHANGE UP (ref 80–94)
MONOCYTES # BLD AUTO: 0.6 K/UL — SIGNIFICANT CHANGE UP (ref 0–0.8)
MONOCYTES NFR BLD AUTO: 10.8 % — HIGH (ref 3–10)
NEUTROPHILS # BLD AUTO: 4.4 K/UL — SIGNIFICANT CHANGE UP (ref 1.8–8)
NEUTROPHILS NFR BLD AUTO: 75.5 % — HIGH (ref 37–73)
OB PNL STL: NEGATIVE — SIGNIFICANT CHANGE UP
PLATELET # BLD AUTO: 143 K/UL — LOW (ref 150–400)
POTASSIUM SERPL-MCNC: 5 MMOL/L — SIGNIFICANT CHANGE UP (ref 3.5–5.3)
POTASSIUM SERPL-SCNC: 5 MMOL/L — SIGNIFICANT CHANGE UP (ref 3.5–5.3)
PROT SERPL-MCNC: 6.4 G/DL — LOW (ref 6.6–8.7)
PROTHROM AB SERPL-ACNC: 36.9 SEC — HIGH (ref 9.8–12.7)
RBC # BLD: 2.32 M/UL — LOW (ref 4.6–6.2)
RBC # FLD: 21.9 % — HIGH (ref 11–15.6)
SODIUM SERPL-SCNC: 138 MMOL/L — SIGNIFICANT CHANGE UP (ref 135–145)
TYPE + AB SCN PNL BLD: SIGNIFICANT CHANGE UP
WBC # BLD: 5.8 K/UL — SIGNIFICANT CHANGE UP (ref 4.8–10.8)
WBC # FLD AUTO: 5.8 K/UL — SIGNIFICANT CHANGE UP (ref 4.8–10.8)

## 2018-08-17 PROCEDURE — 99223 1ST HOSP IP/OBS HIGH 75: CPT

## 2018-08-17 PROCEDURE — 99285 EMERGENCY DEPT VISIT HI MDM: CPT | Mod: GC

## 2018-08-17 RX ORDER — ONDANSETRON 8 MG/1
4 TABLET, FILM COATED ORAL EVERY 6 HOURS
Qty: 0 | Refills: 0 | Status: DISCONTINUED | OUTPATIENT
Start: 2018-08-17 | End: 2018-08-23

## 2018-08-17 RX ORDER — WARFARIN SODIUM 2.5 MG/1
1 TABLET ORAL
Qty: 0 | Refills: 0 | COMMUNITY

## 2018-08-17 RX ORDER — MIRTAZAPINE 45 MG/1
1 TABLET, ORALLY DISINTEGRATING ORAL
Qty: 0 | Refills: 0 | COMMUNITY

## 2018-08-17 RX ORDER — GLUCAGON INJECTION, SOLUTION 0.5 MG/.1ML
1 INJECTION, SOLUTION SUBCUTANEOUS ONCE
Qty: 0 | Refills: 0 | Status: DISCONTINUED | OUTPATIENT
Start: 2018-08-17 | End: 2018-08-27

## 2018-08-17 RX ORDER — SODIUM CHLORIDE 9 MG/ML
1000 INJECTION, SOLUTION INTRAVENOUS
Qty: 0 | Refills: 0 | Status: DISCONTINUED | OUTPATIENT
Start: 2018-08-17 | End: 2018-08-27

## 2018-08-17 RX ORDER — WARFARIN SODIUM 2.5 MG/1
2.5 TABLET ORAL ONCE
Qty: 0 | Refills: 0 | Status: DISCONTINUED | OUTPATIENT
Start: 2018-08-18 | End: 2018-08-18

## 2018-08-17 RX ORDER — INSULIN LISPRO 100/ML
VIAL (ML) SUBCUTANEOUS
Qty: 0 | Refills: 0 | Status: DISCONTINUED | OUTPATIENT
Start: 2018-08-17 | End: 2018-08-27

## 2018-08-17 RX ORDER — DEXTROSE 50 % IN WATER 50 %
25 SYRINGE (ML) INTRAVENOUS ONCE
Qty: 0 | Refills: 0 | Status: DISCONTINUED | OUTPATIENT
Start: 2018-08-17 | End: 2018-08-27

## 2018-08-17 RX ORDER — PANTOPRAZOLE SODIUM 20 MG/1
40 TABLET, DELAYED RELEASE ORAL DAILY
Qty: 0 | Refills: 0 | Status: DISCONTINUED | OUTPATIENT
Start: 2018-08-17 | End: 2018-08-27

## 2018-08-17 RX ORDER — ONDANSETRON 8 MG/1
4 TABLET, FILM COATED ORAL EVERY 6 HOURS
Qty: 0 | Refills: 0 | Status: DISCONTINUED | OUTPATIENT
Start: 2018-08-17 | End: 2018-08-27

## 2018-08-17 RX ORDER — ALLOPURINOL 300 MG
150 TABLET ORAL DAILY
Qty: 0 | Refills: 0 | Status: DISCONTINUED | OUTPATIENT
Start: 2018-08-17 | End: 2018-08-27

## 2018-08-17 RX ORDER — ATORVASTATIN CALCIUM 80 MG/1
40 TABLET, FILM COATED ORAL AT BEDTIME
Qty: 0 | Refills: 0 | Status: DISCONTINUED | OUTPATIENT
Start: 2018-08-17 | End: 2018-08-27

## 2018-08-17 RX ORDER — SODIUM CHLORIDE 9 MG/ML
3 INJECTION INTRAMUSCULAR; INTRAVENOUS; SUBCUTANEOUS EVERY 8 HOURS
Qty: 0 | Refills: 0 | Status: DISCONTINUED | OUTPATIENT
Start: 2018-08-17 | End: 2018-08-27

## 2018-08-17 RX ORDER — LISINOPRIL 2.5 MG/1
1 TABLET ORAL
Qty: 0 | Refills: 0 | COMMUNITY

## 2018-08-17 RX ORDER — MIRTAZAPINE 45 MG/1
7.5 TABLET, ORALLY DISINTEGRATING ORAL AT BEDTIME
Qty: 0 | Refills: 0 | Status: DISCONTINUED | OUTPATIENT
Start: 2018-08-17 | End: 2018-08-27

## 2018-08-17 RX ORDER — ACETAMINOPHEN 500 MG
650 TABLET ORAL EVERY 6 HOURS
Qty: 0 | Refills: 0 | Status: DISCONTINUED | OUTPATIENT
Start: 2018-08-17 | End: 2018-08-27

## 2018-08-17 RX ORDER — DEXTROSE 50 % IN WATER 50 %
15 SYRINGE (ML) INTRAVENOUS ONCE
Qty: 0 | Refills: 0 | Status: DISCONTINUED | OUTPATIENT
Start: 2018-08-17 | End: 2018-08-27

## 2018-08-17 RX ORDER — DEXTROSE 50 % IN WATER 50 %
12.5 SYRINGE (ML) INTRAVENOUS ONCE
Qty: 0 | Refills: 0 | Status: DISCONTINUED | OUTPATIENT
Start: 2018-08-17 | End: 2018-08-27

## 2018-08-17 RX ORDER — DOCUSATE SODIUM 100 MG
100 CAPSULE ORAL THREE TIMES A DAY
Qty: 0 | Refills: 0 | Status: DISCONTINUED | OUTPATIENT
Start: 2018-08-17 | End: 2018-08-25

## 2018-08-17 RX ADMIN — Medication 100 MILLIGRAM(S): at 22:49

## 2018-08-17 RX ADMIN — MIRTAZAPINE 7.5 MILLIGRAM(S): 45 TABLET, ORALLY DISINTEGRATING ORAL at 22:49

## 2018-08-17 RX ADMIN — SODIUM CHLORIDE 3 MILLILITER(S): 9 INJECTION INTRAMUSCULAR; INTRAVENOUS; SUBCUTANEOUS at 21:48

## 2018-08-17 RX ADMIN — Medication 75 MILLIGRAM(S): at 22:49

## 2018-08-17 RX ADMIN — ATORVASTATIN CALCIUM 40 MILLIGRAM(S): 80 TABLET, FILM COATED ORAL at 22:49

## 2018-08-17 NOTE — ED PROVIDER NOTE - OBJECTIVE STATEMENT
83 YOM pmh GIB on coumadin, recently admitted for BRBPR, BIBEMS for anemia hgb of 6.3 no fever, no chills, no chest pain, no abdominal pain. Pt is AOx2 at baseline, forgetful about recent treatments. No current BRBPR. Pt unsure of last bowel movement. 83 YOM pmh GIB on coumadin, recently admitted for BRBPR, BIBEMS for anemia hgb of 6.3 no fever, no chills, no chest pain, no abdominal pain. Pt is AOx2 at baseline, forgetful about recent treatments and events. No current BRBPR. Pt unsure of last bowel movement. Daughter at bedside, pt has not been symptomatic, not complaining of fatigue or weakness. Pt is on home oxygen since he was discharged from hospital 2 weeks ago.

## 2018-08-17 NOTE — H&P ADULT - NSHPREVIEWOFSYSTEMS_GEN_ALL_CORE
REVIEW OF SYSTEMS:  CONSTITUTIONAL:  No Fever or chills  HEENT:  No diplopia or blurred vision.  No earache, sore throat or runny nose.  CARDIOVASCULAR:  No pressure, squeezing, strangling, tightness, heaviness or aching about the chest, neck, axilla or epigastrium.  RESPIRATORY:  No cough, shortness of breath  GASTROINTESTINAL:  No nausea, vomiting or diarrhea.  GENITOURINARY:  No flank pain  MUSCULOSKELETAL:  no joint aches, no muscle pain  SKIN:  No change in skin, hair or nails.  NEUROLOGIC:  + weakness.  PSYCHIATRIC:  No disorder of thought or mood.  ENDOCRINE:  No heat or cold intolerance  HEMATOLOGICAL:  No easy bruising or bleeding.

## 2018-08-17 NOTE — ED ADULT NURSE NOTE - NSIMPLEMENTINTERV_GEN_ALL_ED
Implemented All Fall with Harm Risk Interventions:  [place] to call system. Call bell, personal items and telephone within reach. Instruct patient to call for assistance. Room bathroom lighting operational. Non-slip footwear when patient is off stretcher. Physically safe environment: no spills, clutter or unnecessary equipment. Stretcher in lowest position, wheels locked, appropriate side rails in place. Provide visual cue, wrist band, yellow gown, etc. Monitor gait and stability. Monitor for mental status changes and reorient to person, place, and time. Review medications for side effects contributing to fall risk. Reinforce activity limits and safety measures with patient and family. Provide visual clues: red socks.

## 2018-08-17 NOTE — CONSULT NOTE ADULT - ASSESSMENT
1) Acute anemia  A)  Hb 6.3 , repeat in ED 6.8  B) elevated lactate  1. admit telemetry  2. monitor pulse ox  3. transfuse when blood available  4. CBC in AM  5. GI to see  6. Clears po  7. IV protonix  8. holding procrit and fe    2) CAD s/p CABG  will monitor for demand ischemia  1. serial troponin  2. ekg  3. continue beta-blocker    3) Dementia/ depression  1. continue remeron 15 mg    4) DM II  since po intake reduced  1. Hold metformin  2. BGM  3. ISS    5) s/p mechanical MVR on coumadin  1. continue coumadin  2. keep INR closer to 3    6) Gout  continue allopurinol    7) Skin  sacral decub mentioned by wife 1) Acute anemia  A)  Hb 6.3 , repeat in ED 6.8  B) hx rectal bleed last month  1. admit telemetry  2. monitor pulse ox  3. transfuse when blood available  4. CBC in AM  5. GI to see  6. Clears/consistent CHO po  7. IV protonix 40 mg iv daily  8. holding procrit and fe  9. prn zofran    2) Acute renal failure secondary to above   A) with metabolic acidosis w elevated lactate  1. transfusion requested   2. check BMP, Mg, P in AM  3. holding pt's NaCl tablets    3) CAD s/p CABG  will monitor for demand ischemia  1. serial troponin  2. ekg  3. continue atorvastatin    4) Dementia/ depression  1. continue remeron 7.5 mg    5) DM II w neuropathy  since po intake reduced  1. Hold metformin  2. BGM  3. ISS  4. lyrica   5. gabapentin    6) s/p mechanical MVR on coumadin  usual dose of coumadin 3.5;    1. continue coumadin 2.5 mg x 1 tomorrow  2. PT/INR 08-18+19  3. keep INR closer to 2.5-3    7) Gout  continue allopurinol    8) Skin  sacral decub mentioned by wife and on Rehab chart  1. may eed wound care consult

## 2018-08-17 NOTE — ED ADULT NURSE NOTE - OBJECTIVE STATEMENT
pt transferred from [facility] for abnormal labs, per wife " he has internal bleeding since July. His levels keeping going down and they don't know from where". a+0x2-3, skin pale and dry. bilateral clear breath sounds, abd soft nontender .

## 2018-08-17 NOTE — H&P ADULT - PROBLEM SELECTOR PLAN 7
no back pain/no blurred vision/no loss of consciousness/no seizure/no chest wall tenderness/no weakness/no change in level of consciousness/no vomiting/no chest pain Wound Care consult

## 2018-08-17 NOTE — H&P ADULT - NSHPPHYSICALEXAM_GEN_ALL_CORE
Physical Exam:  Vital Signs Last 24 Hrs  T(C): 36.6 (17 Aug 2018 12:49), Max: 36.6 (17 Aug 2018 12:49)  T(F): 97.8 (17 Aug 2018 12:49), Max: 97.8 (17 Aug 2018 12:49)  HR: 87 (17 Aug 2018 16:21) (87 - 98)  BP: 128/74 (17 Aug 2018 16:21) (128/74 - 137/54)  RR: 20 (17 Aug 2018 16:21) (18 - 20)  SpO2: 98% (17 Aug 2018 16:21) (98% - 98%)  Height (cm): 172.72 (08-17 @ 12:53)  Weight (kg): 70.3 (08-17 @ 12:53)  BMI (kg/m2): 23.6 (08-17 @ 12:53)  BSA (m2): 1.83 (08-17 @ 12:53)    GEN: NAD, pleasant  HEENT: normocephalic and atraumatic. EOMI. PERRL.    NECK: Supple.   LUNGS: Clear to auscultation.  HEART: Regular rate and rhythm   ABDOMEN: Soft, nontender, and nondistended.  Positive bowel sounds.    : No CVA tenderness  EXTREMITIES: Without any edema.  MSK: No joint swelling  NEUROLOGIC: Alert and oriented x3  PSYCHIATRIC: Appropriate affect .  SKIN: No rash

## 2018-08-17 NOTE — ED PROVIDER NOTE - ATTENDING CONTRIBUTION TO CARE
83 year old BIBA from NH for anemia.  Patient denies hematemesis or BRBPR.  Patient on home O2 but appears comfortable, lungs clear to auscultation.  Patient noted to have brown stool on rectal exam, guaiac negative.  Labs showed critical anemia.  Patient was consented for blood transfusion and admitted.

## 2018-08-17 NOTE — ED PROVIDER NOTE - MEDICAL DECISION MAKING DETAILS
GIB with anemia hgb 6.3. Pt borderline tachycardic currently on coumadin. Will get repeat labs, monitor H/H and give prbc as needed. Pt will likely be admitted to hospital.

## 2018-08-17 NOTE — ED ADULT NURSE REASSESSMENT NOTE - NS ED NURSE REASSESS COMMENT FT1
diego dinner brought in by family , awaiting blood transfusion and admission to hospital. family updated on plan of care, questions answered.

## 2018-08-17 NOTE — H&P ADULT - HISTORY OF PRESENT ILLNESS
82 y/o M with PMH of CAD s/p CABG, DM II, s/p MVR on coumadin 3.5mg daily, and s/p PPM who presented to [facility] from [facility] with drop in H/H. No noted bleeding noted. Patient was recently admitted with GI bleed, no GI intervention done at that time. Patient was discharged to Rehab on 8/3/18. Per his Wife patient did not participate in rehab much. In the ER patient was hemodynamically stable. Hemoglobin 6.8, INR of 3.2. Patient was given IV fluids and admitted for anemia.

## 2018-08-17 NOTE — CONSULT NOTE ADULT - ATTENDING COMMENTS
as above    VTE on Coumadin    med rec as above    VTE on Coumadin    med rec  inpatient certification done

## 2018-08-17 NOTE — H&P ADULT - NSHPLABSRESULTS_GEN_ALL_CORE
08-17    138  |  100  |  43.0<H>  ----------------------------<  108  5.0   |  24.0  |  1.71<H>    Ca    8.2<L>      17 Aug 2018 15:10    TPro  6.4<L>  /  Alb  2.7<L>  /  TBili  1.0  /  DBili  x   /  AST  35  /  ALT  12  /  AlkPhos  76  08-17               6.8    5.8   )-----------( 143      ( 17 Aug 2018 15:10 )             20.7       PT/INR - ( 17 Aug 2018 15:10 )   PT: 36.9 sec;   INR: 3.27 ratio         PTT - ( 17 Aug 2018 15:10 )  PTT:39.8 sec    LIVER FUNCTIONS - ( 17 Aug 2018 15:10 )  Alb: 2.7 g/dL / Pro: 6.4 g/dL / ALK PHOS: 76 U/L / ALT: 12 U/L / AST: 35 U/L / GGT: x

## 2018-08-17 NOTE — ED PROVIDER NOTE - NS ED ROS FT
CONSTITUTIONAL: No fevers, no chills  Eyes: no visual changes  Ears: no ear drainage, no ear pain  Nose: no nasal congestion  Mouth/Throat: no sore throat  Cardiovascular: No Chest pain  Respiratory: No SOB  Gastrointestinal: no abd pain  Genitourinary: no dysuria, no hematuria  SKIN: no rashes.  NEURO: no headache

## 2018-08-17 NOTE — ED ADULT TRIAGE NOTE - CHIEF COMPLAINT QUOTE
pt [facility] from [facility] for low hemoglobin level, hgb 6.3, on coumadin, hx of GI bleed but denies bloody stool

## 2018-08-17 NOTE — H&P ADULT - ASSESSMENT
84 y/o M with PMH of CAD s/p CABG, DM II, s/p MVR on coumadin 3.5mg daily, and s/p PPM who presented to [facility] ED from [facility] with drop in H/H.

## 2018-08-17 NOTE — INPATIENT CERTIFICATION FOR MEDICARE PATIENTS - RISKS OF ADVERSE EVENTS
Concern for cardiopulmonary deterioration/death given drop in hemoglobin/Concern for renal deterioration/Other:/Concern for neurologic deterioration

## 2018-08-18 LAB
ANION GAP SERPL CALC-SCNC: 11 MMOL/L — SIGNIFICANT CHANGE UP (ref 5–17)
BASOPHILS # BLD AUTO: 0 K/UL — SIGNIFICANT CHANGE UP (ref 0–0.2)
BASOPHILS NFR BLD AUTO: 0.3 % — SIGNIFICANT CHANGE UP (ref 0–2)
BUN SERPL-MCNC: 35 MG/DL — HIGH (ref 8–20)
CALCIUM SERPL-MCNC: 7.9 MG/DL — LOW (ref 8.6–10.2)
CHLORIDE SERPL-SCNC: 102 MMOL/L — SIGNIFICANT CHANGE UP (ref 98–107)
CO2 SERPL-SCNC: 24 MMOL/L — SIGNIFICANT CHANGE UP (ref 22–29)
CREAT SERPL-MCNC: 1.54 MG/DL — HIGH (ref 0.5–1.3)
EOSINOPHIL # BLD AUTO: 0.1 K/UL — SIGNIFICANT CHANGE UP (ref 0–0.5)
EOSINOPHIL NFR BLD AUTO: 1.2 % — SIGNIFICANT CHANGE UP (ref 0–5)
FERRITIN SERPL-MCNC: 371 NG/ML — SIGNIFICANT CHANGE UP (ref 30–400)
GLUCOSE BLDC GLUCOMTR-MCNC: 103 MG/DL — HIGH (ref 70–99)
GLUCOSE BLDC GLUCOMTR-MCNC: 126 MG/DL — HIGH (ref 70–99)
GLUCOSE BLDC GLUCOMTR-MCNC: 135 MG/DL — HIGH (ref 70–99)
GLUCOSE BLDC GLUCOMTR-MCNC: 152 MG/DL — HIGH (ref 70–99)
GLUCOSE SERPL-MCNC: 127 MG/DL — HIGH (ref 70–115)
HCT VFR BLD CALC: 24.8 % — LOW (ref 42–52)
HGB BLD-MCNC: 8.3 G/DL — LOW (ref 14–18)
INR BLD: 3.21 RATIO — HIGH (ref 0.88–1.16)
IRON SATN MFR SERPL: 28 % — SIGNIFICANT CHANGE UP (ref 16–55)
IRON SATN MFR SERPL: 56 UG/DL — LOW (ref 59–158)
LDH SERPL L TO P-CCNC: 253 U/L — HIGH (ref 98–192)
LYMPHOCYTES # BLD AUTO: 1 K/UL — SIGNIFICANT CHANGE UP (ref 1–4.8)
LYMPHOCYTES # BLD AUTO: 15 % — LOW (ref 20–55)
MCHC RBC-ENTMCNC: 29.9 PG — SIGNIFICANT CHANGE UP (ref 27–31)
MCHC RBC-ENTMCNC: 33.5 G/DL — SIGNIFICANT CHANGE UP (ref 32–36)
MCV RBC AUTO: 89.2 FL — SIGNIFICANT CHANGE UP (ref 80–94)
MONOCYTES # BLD AUTO: 0.9 K/UL — HIGH (ref 0–0.8)
MONOCYTES NFR BLD AUTO: 13.4 % — HIGH (ref 3–10)
NEUTROPHILS # BLD AUTO: 4.8 K/UL — SIGNIFICANT CHANGE UP (ref 1.8–8)
NEUTROPHILS NFR BLD AUTO: 70 % — SIGNIFICANT CHANGE UP (ref 37–73)
PHOSPHATE SERPL-MCNC: 2.6 MG/DL — SIGNIFICANT CHANGE UP (ref 2.4–4.7)
PLATELET # BLD AUTO: 124 K/UL — LOW (ref 150–400)
POTASSIUM SERPL-MCNC: 4.9 MMOL/L — SIGNIFICANT CHANGE UP (ref 3.5–5.3)
POTASSIUM SERPL-SCNC: 4.9 MMOL/L — SIGNIFICANT CHANGE UP (ref 3.5–5.3)
PROTHROM AB SERPL-ACNC: 36.2 SEC — HIGH (ref 9.8–12.7)
RBC # BLD: 2.78 M/UL — LOW (ref 4.6–6.2)
RBC # FLD: 19.6 % — HIGH (ref 11–15.6)
SODIUM SERPL-SCNC: 137 MMOL/L — SIGNIFICANT CHANGE UP (ref 135–145)
TIBC SERPL-MCNC: 203 UG/DL — LOW (ref 220–430)
TRANSFERRIN SERPL-MCNC: 142 MG/DL — LOW (ref 180–329)
TROPONIN T SERPL-MCNC: 0.15 NG/ML — HIGH (ref 0–0.06)
TROPONIN T SERPL-MCNC: 0.15 NG/ML — HIGH (ref 0–0.06)
WBC # BLD: 6.9 K/UL — SIGNIFICANT CHANGE UP (ref 4.8–10.8)
WBC # FLD AUTO: 6.9 K/UL — SIGNIFICANT CHANGE UP (ref 4.8–10.8)

## 2018-08-18 PROCEDURE — 71046 X-RAY EXAM CHEST 2 VIEWS: CPT | Mod: 26

## 2018-08-18 PROCEDURE — 99233 SBSQ HOSP IP/OBS HIGH 50: CPT

## 2018-08-18 RX ORDER — IPRATROPIUM/ALBUTEROL SULFATE 18-103MCG
3 AEROSOL WITH ADAPTER (GRAM) INHALATION ONCE
Qty: 0 | Refills: 0 | Status: COMPLETED | OUTPATIENT
Start: 2018-08-18 | End: 2018-08-18

## 2018-08-18 RX ORDER — IPRATROPIUM/ALBUTEROL SULFATE 18-103MCG
3 AEROSOL WITH ADAPTER (GRAM) INHALATION EVERY 6 HOURS
Qty: 0 | Refills: 0 | Status: DISCONTINUED | OUTPATIENT
Start: 2018-08-18 | End: 2018-08-26

## 2018-08-18 RX ADMIN — MIRTAZAPINE 7.5 MILLIGRAM(S): 45 TABLET, ORALLY DISINTEGRATING ORAL at 21:20

## 2018-08-18 RX ADMIN — Medication 3 MILLILITER(S): at 11:33

## 2018-08-18 RX ADMIN — Medication 75 MILLIGRAM(S): at 05:42

## 2018-08-18 RX ADMIN — SODIUM CHLORIDE 3 MILLILITER(S): 9 INJECTION INTRAMUSCULAR; INTRAVENOUS; SUBCUTANEOUS at 11:06

## 2018-08-18 RX ADMIN — Medication 100 MILLIGRAM(S): at 11:13

## 2018-08-18 RX ADMIN — ATORVASTATIN CALCIUM 40 MILLIGRAM(S): 80 TABLET, FILM COATED ORAL at 21:20

## 2018-08-18 RX ADMIN — Medication 75 MILLIGRAM(S): at 15:17

## 2018-08-18 RX ADMIN — Medication 100 MILLIGRAM(S): at 21:20

## 2018-08-18 RX ADMIN — Medication 2: at 18:14

## 2018-08-18 RX ADMIN — Medication 100 MILLIGRAM(S): at 15:18

## 2018-08-18 RX ADMIN — Medication 75 MILLIGRAM(S): at 21:20

## 2018-08-18 RX ADMIN — Medication 200 MILLIGRAM(S): at 21:20

## 2018-08-18 RX ADMIN — Medication 100 MILLIGRAM(S): at 05:42

## 2018-08-18 RX ADMIN — SODIUM CHLORIDE 3 MILLILITER(S): 9 INJECTION INTRAMUSCULAR; INTRAVENOUS; SUBCUTANEOUS at 21:21

## 2018-08-18 RX ADMIN — Medication 3 MILLILITER(S): at 16:01

## 2018-08-18 RX ADMIN — Medication 3 MILLILITER(S): at 20:44

## 2018-08-18 RX ADMIN — SODIUM CHLORIDE 3 MILLILITER(S): 9 INJECTION INTRAMUSCULAR; INTRAVENOUS; SUBCUTANEOUS at 05:42

## 2018-08-18 RX ADMIN — PANTOPRAZOLE SODIUM 40 MILLIGRAM(S): 20 TABLET, DELAYED RELEASE ORAL at 11:11

## 2018-08-18 NOTE — PROGRESS NOTE ADULT - SUBJECTIVE AND OBJECTIVE BOX
Called by RN re: Trop: 0.15 that was ordered by primary team. Patient receiving II Units of PRBC and also here for MARIBEL.  Most likely elevated cardiac biomarker related to demand ischemia due to significant anemia and acute renal failure. Will check ECG and trend Trops overnight. Patient stable w/o any acute complaints.

## 2018-08-18 NOTE — PROGRESS NOTE ADULT - SUBJECTIVE AND OBJECTIVE BOX
CC: Anemia    INTERVAL HPI/OVERNIGHT EVENTS: Patient seen and examined, sob this morning with wheezing improved with neb treatment. Per family at the bedside poor appetite no recent episodes of bleeding noted at rehab.       Vital Signs Last 24 Hrs  T(C): 36.7 (18 Aug 2018 05:36), Max: 37.3 (18 Aug 2018 02:22)  T(F): 98 (18 Aug 2018 05:36), Max: 99.2 (18 Aug 2018 02:22)  HR: 85 (18 Aug 2018 11:34) (60 - 87)  BP: 134/62 (18 Aug 2018 05:36) (96/40 - 134/62)  BP(mean): --  RR: 16 (18 Aug 2018 05:36) (16 - 20)  SpO2: 93% (18 Aug 2018 11:34) (90% - 98%)    PHYSICAL EXAM:    GENERAL: NAD,AOx2  HEAD:  Atraumatic, Normocephalic  EYES: EOMI, PERRLA, conjunctiva and sclera clear  ENMT: Moist mucous membranes  NECK: Supple, No JVD  CHEST/LUNG: Bibasilar crackles   HEART: Regular rate and rhythm; No murmurs, rubs, or gallops  ABDOMEN: Soft, Nontender, Nondistended; Bowel sounds present  EXTREMITIES:  2+ Peripheral Pulses, No clubbing, cyanosis, or edema        MEDICATIONS  (STANDING):  ALBUTerol/ipratropium for Nebulization 3 milliLiter(s) Nebulizer every 6 hours  allopurinol 150 milliGRAM(s) Oral daily  atorvastatin 40 milliGRAM(s) Oral at bedtime  dextrose 5%. 1000 milliLiter(s) (50 mL/Hr) IV Continuous <Continuous>  dextrose 50% Injectable 12.5 Gram(s) IV Push once  dextrose 50% Injectable 25 Gram(s) IV Push once  dextrose 50% Injectable 25 Gram(s) IV Push once  docusate sodium 100 milliGRAM(s) Oral three times a day  insulin lispro (HumaLOG) corrective regimen sliding scale   SubCutaneous three times a day before meals  mirtazapine 7.5 milliGRAM(s) Oral at bedtime  pantoprazole  Injectable 40 milliGRAM(s) IV Push daily  pregabalin 75 milliGRAM(s) Oral three times a day  sodium chloride 0.9% lock flush 3 milliLiter(s) IV Push every 8 hours    MEDICATIONS  (PRN):  acetaminophen   Tablet. 650 milliGRAM(s) Oral every 6 hours PRN T > 100.4 or pain  dextrose 40% Gel 15 Gram(s) Oral once PRN Blood Glucose LESS THAN 70 milliGRAM(s)/deciliter  glucagon  Injectable 1 milliGRAM(s) IntraMuscular once PRN Glucose LESS THAN 70 milligrams/deciliter  ondansetron    Tablet 4 milliGRAM(s) Oral every 6 hours PRN Nausea and/or Vomiting  ondansetron Injectable 4 milliGRAM(s) IV Push every 6 hours PRN Nausea      Allergies    No Known Allergies    Intolerances          LABS:                          8.3    6.9   )-----------( 124      ( 18 Aug 2018 08:30 )             24.8     08-18    137  |  102  |  35.0<H>  ----------------------------<  127<H>  4.9   |  24.0  |  1.54<H>    Ca    7.9<L>      18 Aug 2018 08:30  Phos  2.6     08-18    TPro  6.4<L>  /  Alb  2.7<L>  /  TBili  1.0  /  DBili  x   /  AST  35  /  ALT  12  /  AlkPhos  76  08-17    PT/INR - ( 18 Aug 2018 08:30 )   PT: 36.2 sec;   INR: 3.21 ratio         PTT - ( 17 Aug 2018 15:10 )  PTT:39.8 sec      RADIOLOGY & ADDITIONAL TESTS:

## 2018-08-18 NOTE — PROGRESS NOTE ADULT - ASSESSMENT
82 y/o M with PMH of CAD s/p CABG, DM II, s/p MVR on coumadin 3.5mg daily, and s/p PPM who presented to [facility] ED from [facility] with drop in H/H.

## 2018-08-19 LAB
ANION GAP SERPL CALC-SCNC: 12 MMOL/L — SIGNIFICANT CHANGE UP (ref 5–17)
BUN SERPL-MCNC: 28 MG/DL — HIGH (ref 8–20)
CALCIUM SERPL-MCNC: 8 MG/DL — LOW (ref 8.6–10.2)
CHLORIDE SERPL-SCNC: 101 MMOL/L — SIGNIFICANT CHANGE UP (ref 98–107)
CO2 SERPL-SCNC: 23 MMOL/L — SIGNIFICANT CHANGE UP (ref 22–29)
CREAT SERPL-MCNC: 1.17 MG/DL — SIGNIFICANT CHANGE UP (ref 0.5–1.3)
GLUCOSE BLDC GLUCOMTR-MCNC: 117 MG/DL — HIGH (ref 70–99)
GLUCOSE BLDC GLUCOMTR-MCNC: 122 MG/DL — HIGH (ref 70–99)
GLUCOSE BLDC GLUCOMTR-MCNC: 124 MG/DL — HIGH (ref 70–99)
GLUCOSE BLDC GLUCOMTR-MCNC: 147 MG/DL — HIGH (ref 70–99)
GLUCOSE SERPL-MCNC: 104 MG/DL — SIGNIFICANT CHANGE UP (ref 70–115)
HCT VFR BLD CALC: 26.7 % — LOW (ref 42–52)
HGB BLD-MCNC: 9 G/DL — LOW (ref 14–18)
INR BLD: 3.26 RATIO — HIGH (ref 0.88–1.16)
MCHC RBC-ENTMCNC: 30.5 PG — SIGNIFICANT CHANGE UP (ref 27–31)
MCHC RBC-ENTMCNC: 33.7 G/DL — SIGNIFICANT CHANGE UP (ref 32–36)
MCV RBC AUTO: 90.5 FL — SIGNIFICANT CHANGE UP (ref 80–94)
PLATELET # BLD AUTO: 117 K/UL — LOW (ref 150–400)
POTASSIUM SERPL-MCNC: 5.3 MMOL/L — SIGNIFICANT CHANGE UP (ref 3.5–5.3)
POTASSIUM SERPL-SCNC: 5.3 MMOL/L — SIGNIFICANT CHANGE UP (ref 3.5–5.3)
PROTHROM AB SERPL-ACNC: 36.7 SEC — HIGH (ref 9.8–12.7)
RBC # BLD: 2.95 M/UL — LOW (ref 4.6–6.2)
RBC # FLD: 19.8 % — HIGH (ref 11–15.6)
SODIUM SERPL-SCNC: 136 MMOL/L — SIGNIFICANT CHANGE UP (ref 135–145)
WBC # BLD: 9.6 K/UL — SIGNIFICANT CHANGE UP (ref 4.8–10.8)
WBC # FLD AUTO: 9.6 K/UL — SIGNIFICANT CHANGE UP (ref 4.8–10.8)

## 2018-08-19 PROCEDURE — 99233 SBSQ HOSP IP/OBS HIGH 50: CPT

## 2018-08-19 RX ORDER — BENZOCAINE AND MENTHOL 5; 1 G/100ML; G/100ML
1 LIQUID ORAL
Qty: 0 | Refills: 0 | Status: DISCONTINUED | OUTPATIENT
Start: 2018-08-19 | End: 2018-08-27

## 2018-08-19 RX ORDER — FUROSEMIDE 40 MG
20 TABLET ORAL ONCE
Qty: 0 | Refills: 0 | Status: COMPLETED | OUTPATIENT
Start: 2018-08-19 | End: 2018-08-19

## 2018-08-19 RX ADMIN — MIRTAZAPINE 7.5 MILLIGRAM(S): 45 TABLET, ORALLY DISINTEGRATING ORAL at 22:35

## 2018-08-19 RX ADMIN — Medication 3 MILLILITER(S): at 09:29

## 2018-08-19 RX ADMIN — Medication 20 MILLIGRAM(S): at 17:41

## 2018-08-19 RX ADMIN — Medication 150 MILLIGRAM(S): at 12:07

## 2018-08-19 RX ADMIN — PANTOPRAZOLE SODIUM 40 MILLIGRAM(S): 20 TABLET, DELAYED RELEASE ORAL at 12:07

## 2018-08-19 RX ADMIN — ATORVASTATIN CALCIUM 40 MILLIGRAM(S): 80 TABLET, FILM COATED ORAL at 22:35

## 2018-08-19 RX ADMIN — Medication 100 MILLIGRAM(S): at 22:36

## 2018-08-19 RX ADMIN — Medication 100 MILLIGRAM(S): at 13:46

## 2018-08-19 RX ADMIN — SODIUM CHLORIDE 3 MILLILITER(S): 9 INJECTION INTRAMUSCULAR; INTRAVENOUS; SUBCUTANEOUS at 14:54

## 2018-08-19 RX ADMIN — Medication 75 MILLIGRAM(S): at 13:45

## 2018-08-19 RX ADMIN — Medication 3 MILLILITER(S): at 02:20

## 2018-08-19 RX ADMIN — Medication 75 MILLIGRAM(S): at 06:41

## 2018-08-19 RX ADMIN — SODIUM CHLORIDE 3 MILLILITER(S): 9 INJECTION INTRAMUSCULAR; INTRAVENOUS; SUBCUTANEOUS at 22:33

## 2018-08-19 RX ADMIN — SODIUM CHLORIDE 3 MILLILITER(S): 9 INJECTION INTRAMUSCULAR; INTRAVENOUS; SUBCUTANEOUS at 06:41

## 2018-08-19 RX ADMIN — Medication 3 MILLILITER(S): at 20:52

## 2018-08-19 RX ADMIN — Medication 3 MILLILITER(S): at 15:48

## 2018-08-19 RX ADMIN — Medication 75 MILLIGRAM(S): at 22:36

## 2018-08-19 RX ADMIN — Medication 100 MILLIGRAM(S): at 06:40

## 2018-08-19 NOTE — PROGRESS NOTE ADULT - SUBJECTIVE AND OBJECTIVE BOX
CC: sob    INTERVAL HPI/OVERNIGHT EVENTS: Patient seen and examined, sob improved. + non productive cough. denies abdominal pain, nausea or vomiting. afebrile.       Vital Signs Last 24 Hrs  T(C): 36.7 (19 Aug 2018 06:36), Max: 36.7 (18 Aug 2018 21:17)  T(F): 98.1 (19 Aug 2018 06:36), Max: 98.1 (19 Aug 2018 06:36)  HR: 61 (19 Aug 2018 06:36) (60 - 82)  BP: 116/60 (19 Aug 2018 06:36) (114/56 - 132/60)  BP(mean): --  RR: 18 (19 Aug 2018 06:36) (16 - 18)  SpO2: 99% (19 Aug 2018 06:36) (94% - 99%)    PHYSICAL EXAM:    GENERAL: NAD, AOx2  HEAD:  Atraumatic, Normocephalic  EYES: EOMI, PERRLA, conjunctiva and sclera clear  ENMT: Moist mucous membranes  NECK: Supple, No JVD  CHEST/LUNG: Bibasilar crackles   HEART: Regular rate and rhythm; No murmurs, rubs, or gallops  ABDOMEN: Soft, Nontender, Nondistended; Bowel sounds present  EXTREMITIES:  2+ Peripheral Pulses, No clubbing, cyanosis, or edema        MEDICATIONS  (STANDING):  ALBUTerol/ipratropium for Nebulization 3 milliLiter(s) Nebulizer every 6 hours  allopurinol 150 milliGRAM(s) Oral daily  atorvastatin 40 milliGRAM(s) Oral at bedtime  dextrose 5%. 1000 milliLiter(s) (50 mL/Hr) IV Continuous <Continuous>  dextrose 50% Injectable 12.5 Gram(s) IV Push once  dextrose 50% Injectable 25 Gram(s) IV Push once  dextrose 50% Injectable 25 Gram(s) IV Push once  docusate sodium 100 milliGRAM(s) Oral three times a day  insulin lispro (HumaLOG) corrective regimen sliding scale   SubCutaneous three times a day before meals  mirtazapine 7.5 milliGRAM(s) Oral at bedtime  pantoprazole  Injectable 40 milliGRAM(s) IV Push daily  pregabalin 75 milliGRAM(s) Oral three times a day  sodium chloride 0.9% lock flush 3 milliLiter(s) IV Push every 8 hours    MEDICATIONS  (PRN):  acetaminophen   Tablet. 650 milliGRAM(s) Oral every 6 hours PRN T > 100.4 or pain  dextrose 40% Gel 15 Gram(s) Oral once PRN Blood Glucose LESS THAN 70 milliGRAM(s)/deciliter  glucagon  Injectable 1 milliGRAM(s) IntraMuscular once PRN Glucose LESS THAN 70 milligrams/deciliter  ondansetron    Tablet 4 milliGRAM(s) Oral every 6 hours PRN Nausea and/or Vomiting  ondansetron Injectable 4 milliGRAM(s) IV Push every 6 hours PRN Nausea      Allergies    No Known Allergies    Intolerances          LABS:                          9.0    9.6   )-----------( 117      ( 19 Aug 2018 06:00 )             26.7     08-19    136  |  101  |  28.0<H>  ----------------------------<  104  5.3   |  23.0  |  1.17    Ca    8.0<L>      19 Aug 2018 06:00  Phos  2.6     08-18    TPro  6.4<L>  /  Alb  2.7<L>  /  TBili  1.0  /  DBili  x   /  AST  35  /  ALT  12  /  AlkPhos  76  08-17    PT/INR - ( 19 Aug 2018 06:00 )   PT: 36.7 sec;   INR: 3.26 ratio         PTT - ( 17 Aug 2018 15:10 )  PTT:39.8 sec      RADIOLOGY & ADDITIONAL TESTS:

## 2018-08-19 NOTE — PROGRESS NOTE ADULT - PROBLEM SELECTOR PLAN 1
Hb/hbct stable after transfusion  Cardiac monitoring  Monitor H/H  FOBT negative  Patients familiy requesting GI evaluation for possible colonoscopy inpatient.

## 2018-08-19 NOTE — PROGRESS NOTE ADULT - ASSESSMENT
84 y/o M with PMH of CAD s/p CABG, DM II, s/p MVR on coumadin 3.5mg daily, and s/p PPM who presented to [facility] ED from [facility] with drop in H/H. 82 y/o M with PMH of CAD s/p CABG, DM II, s/p MVR on coumadin 3.5mg daily, and s/p PPM who presented to [facility] ED from [facility] with drop in H/H. No noted bleeding noted. Patient was recently admitted with GI bleed, no GI intervention done at that time. Patient was discharged to Rehab on 8/3/18. Per his Wife patient did not participate in rehab much. In the ER patient was hemodynamically stable. Hemoglobin 6.8, INR of 3.2. Patient was given IV fluids and admitted for anemia.  Transfused 2 units of PRBC.     Assessment/plan:    1. Acute on chronic microcytic anemia: FOBT negative; no s/s of active bleeding at this time  Hb/hbct stable after transfusion  Cardiac monitoring  Patients family requesting GI evaluation for possible colonoscopy inpatient.    2. MARIBEL: Likely azotemia from anemia  Resolved    3. Mitral valve replacement goal of INR at 2.5 to 3  Check INR in AM  Hold coumadin for inr >3    4. CAD Asymptomatic  not on ASA or plavix  continue metoprolol and statin    5. Mild cognitive impairment    6. Diabetes mellitus type 2 Sliding scale   Monitor blood sugar    7. Sacral wound: Wound care consult.     PT evaluation ordered  Plan discussed in detail with patient's wife and daughter at the bedside

## 2018-08-20 LAB
ANION GAP SERPL CALC-SCNC: 12 MMOL/L — SIGNIFICANT CHANGE UP (ref 5–17)
ANION GAP SERPL CALC-SCNC: 12 MMOL/L — SIGNIFICANT CHANGE UP (ref 5–17)
BUN SERPL-MCNC: 22 MG/DL — HIGH (ref 8–20)
BUN SERPL-MCNC: 22 MG/DL — HIGH (ref 8–20)
CALCIUM SERPL-MCNC: 7.8 MG/DL — LOW (ref 8.6–10.2)
CALCIUM SERPL-MCNC: 8.2 MG/DL — LOW (ref 8.6–10.2)
CHLORIDE SERPL-SCNC: 102 MMOL/L — SIGNIFICANT CHANGE UP (ref 98–107)
CHLORIDE SERPL-SCNC: 98 MMOL/L — SIGNIFICANT CHANGE UP (ref 98–107)
CO2 SERPL-SCNC: 25 MMOL/L — SIGNIFICANT CHANGE UP (ref 22–29)
CO2 SERPL-SCNC: 28 MMOL/L — SIGNIFICANT CHANGE UP (ref 22–29)
CREAT SERPL-MCNC: 1.06 MG/DL — SIGNIFICANT CHANGE UP (ref 0.5–1.3)
CREAT SERPL-MCNC: 1.13 MG/DL — SIGNIFICANT CHANGE UP (ref 0.5–1.3)
GLUCOSE BLDC GLUCOMTR-MCNC: 116 MG/DL — HIGH (ref 70–99)
GLUCOSE BLDC GLUCOMTR-MCNC: 143 MG/DL — HIGH (ref 70–99)
GLUCOSE BLDC GLUCOMTR-MCNC: 163 MG/DL — HIGH (ref 70–99)
GLUCOSE SERPL-MCNC: 112 MG/DL — SIGNIFICANT CHANGE UP (ref 70–115)
GLUCOSE SERPL-MCNC: 174 MG/DL — HIGH (ref 70–115)
HCT VFR BLD CALC: 25.5 % — LOW (ref 42–52)
HGB BLD-MCNC: 8.5 G/DL — LOW (ref 14–18)
INR BLD: 4.11 RATIO — HIGH (ref 0.88–1.16)
MAGNESIUM SERPL-MCNC: 1.5 MG/DL — LOW (ref 1.6–2.6)
MCHC RBC-ENTMCNC: 30.5 PG — SIGNIFICANT CHANGE UP (ref 27–31)
MCHC RBC-ENTMCNC: 33.3 G/DL — SIGNIFICANT CHANGE UP (ref 32–36)
MCV RBC AUTO: 91.4 FL — SIGNIFICANT CHANGE UP (ref 80–94)
PLATELET # BLD AUTO: 131 K/UL — LOW (ref 150–400)
POTASSIUM SERPL-MCNC: 3.6 MMOL/L — SIGNIFICANT CHANGE UP (ref 3.5–5.3)
POTASSIUM SERPL-MCNC: 3.8 MMOL/L — SIGNIFICANT CHANGE UP (ref 3.5–5.3)
POTASSIUM SERPL-SCNC: 3.6 MMOL/L — SIGNIFICANT CHANGE UP (ref 3.5–5.3)
POTASSIUM SERPL-SCNC: 3.8 MMOL/L — SIGNIFICANT CHANGE UP (ref 3.5–5.3)
PROTHROM AB SERPL-ACNC: 46.5 SEC — HIGH (ref 9.8–12.7)
RBC # BLD: 2.79 M/UL — LOW (ref 4.6–6.2)
RBC # FLD: 19.8 % — HIGH (ref 11–15.6)
SODIUM SERPL-SCNC: 138 MMOL/L — SIGNIFICANT CHANGE UP (ref 135–145)
SODIUM SERPL-SCNC: 139 MMOL/L — SIGNIFICANT CHANGE UP (ref 135–145)
WBC # BLD: 7.6 K/UL — SIGNIFICANT CHANGE UP (ref 4.8–10.8)
WBC # FLD AUTO: 7.6 K/UL — SIGNIFICANT CHANGE UP (ref 4.8–10.8)

## 2018-08-20 PROCEDURE — 93306 TTE W/DOPPLER COMPLETE: CPT | Mod: 26

## 2018-08-20 PROCEDURE — 99233 SBSQ HOSP IP/OBS HIGH 50: CPT

## 2018-08-20 RX ORDER — MAGNESIUM SULFATE 500 MG/ML
2 VIAL (ML) INJECTION ONCE
Qty: 0 | Refills: 0 | Status: COMPLETED | OUTPATIENT
Start: 2018-08-20 | End: 2018-08-20

## 2018-08-20 RX ORDER — FUROSEMIDE 40 MG
40 TABLET ORAL ONCE
Qty: 0 | Refills: 0 | Status: COMPLETED | OUTPATIENT
Start: 2018-08-20 | End: 2018-08-20

## 2018-08-20 RX ORDER — NYSTATIN CREAM 100000 [USP'U]/G
1 CREAM TOPICAL
Qty: 0 | Refills: 0 | Status: DISCONTINUED | OUTPATIENT
Start: 2018-08-20 | End: 2018-08-27

## 2018-08-20 RX ADMIN — Medication 150 MILLIGRAM(S): at 13:02

## 2018-08-20 RX ADMIN — SODIUM CHLORIDE 3 MILLILITER(S): 9 INJECTION INTRAMUSCULAR; INTRAVENOUS; SUBCUTANEOUS at 05:46

## 2018-08-20 RX ADMIN — PANTOPRAZOLE SODIUM 40 MILLIGRAM(S): 20 TABLET, DELAYED RELEASE ORAL at 13:01

## 2018-08-20 RX ADMIN — Medication 100 MILLIGRAM(S): at 13:03

## 2018-08-20 RX ADMIN — Medication 75 MILLIGRAM(S): at 05:47

## 2018-08-20 RX ADMIN — Medication 100 MILLIGRAM(S): at 21:36

## 2018-08-20 RX ADMIN — SODIUM CHLORIDE 3 MILLILITER(S): 9 INJECTION INTRAMUSCULAR; INTRAVENOUS; SUBCUTANEOUS at 13:03

## 2018-08-20 RX ADMIN — Medication 3 MILLILITER(S): at 15:17

## 2018-08-20 RX ADMIN — Medication 100 MILLIGRAM(S): at 05:47

## 2018-08-20 RX ADMIN — Medication 75 MILLIGRAM(S): at 21:36

## 2018-08-20 RX ADMIN — Medication 40 MILLIGRAM(S): at 09:57

## 2018-08-20 RX ADMIN — MIRTAZAPINE 7.5 MILLIGRAM(S): 45 TABLET, ORALLY DISINTEGRATING ORAL at 21:36

## 2018-08-20 RX ADMIN — Medication 75 MILLIGRAM(S): at 13:02

## 2018-08-20 RX ADMIN — NYSTATIN CREAM 1 APPLICATION(S): 100000 CREAM TOPICAL at 17:58

## 2018-08-20 RX ADMIN — Medication 50 GRAM(S): at 18:30

## 2018-08-20 RX ADMIN — Medication 3 MILLILITER(S): at 20:32

## 2018-08-20 RX ADMIN — SODIUM CHLORIDE 3 MILLILITER(S): 9 INJECTION INTRAMUSCULAR; INTRAVENOUS; SUBCUTANEOUS at 21:44

## 2018-08-20 RX ADMIN — ATORVASTATIN CALCIUM 40 MILLIGRAM(S): 80 TABLET, FILM COATED ORAL at 21:36

## 2018-08-20 RX ADMIN — Medication 2: at 17:56

## 2018-08-20 NOTE — PROGRESS NOTE ADULT - ASSESSMENT
82 y/o M with PMH of CAD s/p CABG, DM II, s/p MVR on coumadin 3.5mg daily, and s/p PPM who presented to [facility] ED from [facility] with drop in H/H. No noted bleeding noted. Patient was recently admitted with GI bleed, no GI intervention done at that time. Patient was discharged to Rehab on 8/3/18. Per his Wife patient did not participate in rehab much. In the ER patient was hemodynamically stable. Hemoglobin 6.8, INR of 3.2. Patient was given IV fluids and admitted for anemia.  Transfused 2 units of PRBC.     Assessment/plan:    1. Acute on chronic microcytic anemia: FOBT negative; no s/s of active bleeding at this time  Hb/hbct stable after transfusion  Advance diet   Patients family requesting GI evaluation for possible colonoscopy inpatient. Dr Bai consulted     2. MARIBEL: Likely azotemia from anemia  Resolved    3. Mitral valve replacement goal of INR at 2.5 to 3  Check INR in AM  Hold coumadin for inr >3. No s/s of active bleeding.     4. CAD Asymptomatic  not on ASA or plavix  continue metoprolol and statin    5. Mild cognitive impairment    6. Diabetes mellitus type 2 Sliding scale   Monitor blood sugar    7. Dyspnea with acute hypoxia  likely acute CHF: Stat BNP; IV lasix x 1; Echocardiogram ordered    PT evaluation ordered 82 y/o M with PMH of CAD s/p CABG, DM II, s/p MVR on coumadin 3.5mg daily, and s/p PPM who presented to [facility] ED from [facility] with drop in H/H. No noted bleeding noted. Patient was recently admitted with GI bleed, no GI intervention done at that time. Patient was discharged to Rehab on 8/3/18. Per his Wife patient did not participate in rehab much. In the ER patient was hemodynamically stable. Hemoglobin 6.8, INR of 3.2. Patient was given IV fluids and admitted for anemia.  Transfused 2 units of PRBC.     Assessment/plan:    1. Acute on chronic microcytic anemia: FOBT negative; no s/s of active bleeding at this time  Hb/hbct stable after transfusion  Spoke with Dr Bai, Gi. Given now second admission for anemia on AC will plan for colonoscopy this admission Will continue clear liquid diet. He will need cardiac clearance and optimization prior to procedure.       2. MARIBEL: Likely azotemia from anemia  Resolved    3. Mitral valve replacement goal of INR at 2.5 to 3. INR now supra therapeutic Coumadin on hold. No s/s of active bleeding.   Repeat INR in AM.     Will discuss need for reversal and IV heparin with cardiology also possible need for pre-op antibiotics prior to colonoscopy with cardiology. [facility] consulted for recommendations.   Will need cardiac clearance prior to GI procedures    4. CAD Asymptomatic  not on ASA or plavix  continue metoprolol and statin    5. Mild cognitive impairment: Decisions and consent deferred to patient's HCP his wife at this time.     6. Diabetes mellitus type 2 Sliding scale   Monitor blood sugar    7. Dyspnea with acute hypoxia  likely acute CHF: Stat BNP; IV lasix x 1; Echocardiogram ordered    PT evaluation ordered 84 y/o M with PMH of CAD s/p CABG, DM II, s/p MVR on coumadin 3.5mg daily, and s/p PPM who presented to [facility] ED from [facility] with drop in H/H. No noted bleeding noted. Patient was recently admitted with GI bleed, no GI intervention done at that time. Patient was discharged to Rehab on 8/3/18. Per his Wife patient did not participate in rehab much. In the ER patient was hemodynamically stable. Hemoglobin 6.8, INR of 3.2. Patient was given IV fluids and admitted for anemia.  Transfused 2 units of PRBC.     Assessment/plan:    1. Acute on chronic microcytic anemia: FOBT negative; no s/s of active bleeding at this time  Hb/hbct stable after transfusion  Spoke with Dr Bai, Gi. Given now second admission for anemia on AC will plan for colonoscopy this admission Will continue clear liquid diet. He will need cardiac clearance and optimization prior to procedure.       2. MARIBEL: Likely azotemia from anemia  Resolved    3. Mitral valve replacement goal of INR at 2.5 to 3. INR now supra therapeutic Coumadin on hold. No s/s of active bleeding.   Repeat INR in AM.     Will discuss need for reversal and IV heparin with cardiology also possible need for pre-op antibiotics prior to colonoscopy with cardiology. [facility] consulted for recommendations. Spoke with Dr Alvarado, to evaluate patient for recommendations    Will need cardiac clearance prior to GI procedures    4. CAD Asymptomatic  not on ASA or plavix  continue metoprolol and statin    5. Mild cognitive impairment: Decisions and consent deferred to patient's HCP his wife at this time.     6. Diabetes mellitus type 2 Sliding scale   Monitor blood sugar    7. Dyspnea with acute hypoxia  likely acute CHF: Stat BNP; IV lasix x 1; Echocardiogram ordered    PT evaluation ordered 84 y/o M with PMH of CAD s/p CABG, DM II, s/p MVR on coumadin 3.5mg daily, and s/p PPM who presented to [facility] ED from [facility] with drop in H/H. No noted bleeding noted. Patient was recently admitted with GI bleed, no GI intervention done at that time. Patient was discharged to Rehab on 8/3/18. Per his Wife patient did not participate in rehab much. In the ER patient was hemodynamically stable. Hemoglobin 6.8, INR of 3.2. Patient was given IV fluids and admitted for anemia.  Transfused 2 units of PRBC.     Assessment/plan:    1. Acute on chronic microcytic anemia: FOBT negative; no s/s of active bleeding at this time  Hb/hbct stable after transfusion  Spoke with Dr Bai, Gi. Given now second admission for anemia on AC will plan for colonoscopy this admission Will continue clear liquid diet. He will need cardiac clearance and optimization prior to procedure.       2. MARIBEL: Likely azotemia from anemia  Resolved    3. Mechanical mitral valve:  goal of INR at 2.5 to 3. INR now supra therapeutic Coumadin on hold. No s/s of active bleeding.   Repeat INR in AM.     Will discuss need for reversal and IV heparin with cardiology also possible need for pre-op antibiotics prior to colonoscopy with cardiology. [facility] consulted for recommendations. Spoke with Dr Alvarado, to evaluate patient for recommendations    Will need cardiac clearance prior to GI procedures    4. CAD Asymptomatic  not on ASA or plavix  continue metoprolol and statin    5. Mild cognitive impairment: Decisions and consent deferred to patient's HCP his wife at this time.     6. Diabetes mellitus type 2 Sliding scale   Monitor blood sugar    7. Dyspnea with acute hypoxia  likely acute CHF: Stat BNP; IV lasix x 1; Echocardiogram ordered    PT evaluation ordered

## 2018-08-20 NOTE — PROGRESS NOTE ADULT - SUBJECTIVE AND OBJECTIVE BOX
CC: SOB    INTERVAL HPI/OVERNIGHT EVENTS: Patient seen and examined this morning, complaints of sob and chest pressure. Spo2 on room air at rest of 88% improved with o2 via nasal cannula. denies abdominal pain, nausea or vomiting. No episodes of rectal bleeidng or melena. Tolerating liquid diet.       Vital Signs Last 24 Hrs  T(C): 36.4 (20 Aug 2018 05:42), Max: 36.7 (19 Aug 2018 12:00)  T(F): 97.6 (20 Aug 2018 05:42), Max: 98.1 (19 Aug 2018 12:00)  HR: 70 (20 Aug 2018 05:42) (68 - 92)  BP: 126/59 (20 Aug 2018 05:42) (105/60 - 126/59)  BP(mean): --  RR: 18 (20 Aug 2018 05:42) (18 - 18)  SpO2: 93% (20 Aug 2018 05:42) (92% - 99%)    PHYSICAL EXAM:    GENERAL: NAD, Aox2  HEAD:  Atraumatic, Normocephalic  EYES: EOMI, PERRLA, conjunctiva and sclera clear  ENMT: Moist mucous membranes  NECK: Supple, No JVD  CHEST/LUNG: Crackles bilaterally   HEART: Regular rate and rhythm; No murmurs, rubs, or gallops  ABDOMEN: Soft, Nontender, Nondistended; Bowel sounds present  EXTREMITIES:  2+ Peripheral Pulses, No clubbing, cyanosis, or edema        MEDICATIONS  (STANDING):  ALBUTerol/ipratropium for Nebulization 3 milliLiter(s) Nebulizer every 6 hours  allopurinol 150 milliGRAM(s) Oral daily  atorvastatin 40 milliGRAM(s) Oral at bedtime  dextrose 5%. 1000 milliLiter(s) (50 mL/Hr) IV Continuous <Continuous>  dextrose 50% Injectable 12.5 Gram(s) IV Push once  dextrose 50% Injectable 25 Gram(s) IV Push once  dextrose 50% Injectable 25 Gram(s) IV Push once  docusate sodium 100 milliGRAM(s) Oral three times a day  insulin lispro (HumaLOG) corrective regimen sliding scale   SubCutaneous three times a day before meals  mirtazapine 7.5 milliGRAM(s) Oral at bedtime  nystatin Powder 1 Application(s) Topical two times a day  pantoprazole  Injectable 40 milliGRAM(s) IV Push daily  pregabalin 75 milliGRAM(s) Oral three times a day  sodium chloride 0.9% lock flush 3 milliLiter(s) IV Push every 8 hours    MEDICATIONS  (PRN):  acetaminophen   Tablet. 650 milliGRAM(s) Oral every 6 hours PRN T > 100.4 or pain  benzocaine 15 mG/menthol 3.6 mG Lozenge 1 Lozenge Oral two times a day PRN Sore Throat  dextrose 40% Gel 15 Gram(s) Oral once PRN Blood Glucose LESS THAN 70 milliGRAM(s)/deciliter  glucagon  Injectable 1 milliGRAM(s) IntraMuscular once PRN Glucose LESS THAN 70 milligrams/deciliter  ondansetron    Tablet 4 milliGRAM(s) Oral every 6 hours PRN Nausea and/or Vomiting  ondansetron Injectable 4 milliGRAM(s) IV Push every 6 hours PRN Nausea      Allergies    No Known Allergies    Intolerances          LABS:                          8.5    7.6   )-----------( 131      ( 20 Aug 2018 05:57 )             25.5     08-20    139  |  102  |  22.0<H>  ----------------------------<  112  3.8   |  25.0  |  1.06    Ca    7.8<L>      20 Aug 2018 05:57      PT/INR - ( 20 Aug 2018 05:57 )   PT: 46.5 sec;   INR: 4.11 ratio               RADIOLOGY & ADDITIONAL TESTS:

## 2018-08-21 LAB
ANION GAP SERPL CALC-SCNC: 13 MMOL/L — SIGNIFICANT CHANGE UP (ref 5–17)
BUN SERPL-MCNC: 22 MG/DL — HIGH (ref 8–20)
CALCIUM SERPL-MCNC: 8.1 MG/DL — LOW (ref 8.6–10.2)
CHLORIDE SERPL-SCNC: 96 MMOL/L — LOW (ref 98–107)
CO2 SERPL-SCNC: 28 MMOL/L — SIGNIFICANT CHANGE UP (ref 22–29)
CREAT SERPL-MCNC: 1.31 MG/DL — HIGH (ref 0.5–1.3)
GLUCOSE BLDC GLUCOMTR-MCNC: 126 MG/DL — HIGH (ref 70–99)
GLUCOSE BLDC GLUCOMTR-MCNC: 136 MG/DL — HIGH (ref 70–99)
GLUCOSE BLDC GLUCOMTR-MCNC: 161 MG/DL — HIGH (ref 70–99)
GLUCOSE BLDC GLUCOMTR-MCNC: 166 MG/DL — HIGH (ref 70–99)
GLUCOSE SERPL-MCNC: 121 MG/DL — HIGH (ref 70–115)
HCT VFR BLD CALC: 26.4 % — LOW (ref 42–52)
HGB BLD-MCNC: 8.7 G/DL — LOW (ref 14–18)
INR BLD: 3.08 RATIO — HIGH (ref 0.88–1.16)
MCHC RBC-ENTMCNC: 29.9 PG — SIGNIFICANT CHANGE UP (ref 27–31)
MCHC RBC-ENTMCNC: 33 G/DL — SIGNIFICANT CHANGE UP (ref 32–36)
MCV RBC AUTO: 90.7 FL — SIGNIFICANT CHANGE UP (ref 80–94)
NT-PROBNP SERPL-SCNC: 4433 PG/ML — HIGH (ref 0–300)
PLATELET # BLD AUTO: 143 K/UL — LOW (ref 150–400)
POTASSIUM SERPL-MCNC: 3.5 MMOL/L — SIGNIFICANT CHANGE UP (ref 3.5–5.3)
POTASSIUM SERPL-SCNC: 3.5 MMOL/L — SIGNIFICANT CHANGE UP (ref 3.5–5.3)
PROTHROM AB SERPL-ACNC: 34.7 SEC — HIGH (ref 9.8–12.7)
RBC # BLD: 2.91 M/UL — LOW (ref 4.6–6.2)
RBC # FLD: 20.1 % — HIGH (ref 11–15.6)
SODIUM SERPL-SCNC: 137 MMOL/L — SIGNIFICANT CHANGE UP (ref 135–145)
WBC # BLD: 7.3 K/UL — SIGNIFICANT CHANGE UP (ref 4.8–10.8)
WBC # FLD AUTO: 7.3 K/UL — SIGNIFICANT CHANGE UP (ref 4.8–10.8)

## 2018-08-21 PROCEDURE — 99233 SBSQ HOSP IP/OBS HIGH 50: CPT

## 2018-08-21 RX ORDER — GENTAMICIN SULFATE 40 MG/ML
80 VIAL (ML) INJECTION ONCE
Qty: 0 | Refills: 0 | Status: DISCONTINUED | OUTPATIENT
Start: 2018-08-22 | End: 2018-08-24

## 2018-08-21 RX ORDER — SOD SULF/SODIUM/NAHCO3/KCL/PEG
1000 SOLUTION, RECONSTITUTED, ORAL ORAL
Qty: 0 | Refills: 0 | Status: COMPLETED | OUTPATIENT
Start: 2018-08-21 | End: 2018-08-21

## 2018-08-21 RX ORDER — PHYTONADIONE (VIT K1) 5 MG
5 TABLET ORAL ONCE
Qty: 0 | Refills: 0 | Status: COMPLETED | OUTPATIENT
Start: 2018-08-21 | End: 2018-08-21

## 2018-08-21 RX ORDER — MULTIVIT WITH MIN/MFOLATE/K2 340-15/3 G
240 POWDER (GRAM) ORAL ONCE
Qty: 0 | Refills: 0 | Status: COMPLETED | OUTPATIENT
Start: 2018-08-21 | End: 2018-08-21

## 2018-08-21 RX ORDER — AMPICILLIN TRIHYDRATE 250 MG
2 CAPSULE ORAL ONCE
Qty: 0 | Refills: 0 | Status: DISCONTINUED | OUTPATIENT
Start: 2018-08-22 | End: 2018-08-24

## 2018-08-21 RX ADMIN — SODIUM CHLORIDE 3 MILLILITER(S): 9 INJECTION INTRAMUSCULAR; INTRAVENOUS; SUBCUTANEOUS at 22:49

## 2018-08-21 RX ADMIN — NYSTATIN CREAM 1 APPLICATION(S): 100000 CREAM TOPICAL at 05:55

## 2018-08-21 RX ADMIN — Medication 3 MILLILITER(S): at 02:58

## 2018-08-21 RX ADMIN — Medication 100 MILLIGRAM(S): at 13:10

## 2018-08-21 RX ADMIN — Medication 100 MILLIGRAM(S): at 05:55

## 2018-08-21 RX ADMIN — Medication 75 MILLIGRAM(S): at 05:55

## 2018-08-21 RX ADMIN — Medication 3 MILLILITER(S): at 15:40

## 2018-08-21 RX ADMIN — Medication 1000 MILLILITER(S): at 19:08

## 2018-08-21 RX ADMIN — SODIUM CHLORIDE 3 MILLILITER(S): 9 INJECTION INTRAMUSCULAR; INTRAVENOUS; SUBCUTANEOUS at 05:54

## 2018-08-21 RX ADMIN — Medication 200 MILLIGRAM(S): at 05:57

## 2018-08-21 RX ADMIN — Medication 150 MILLIGRAM(S): at 13:12

## 2018-08-21 RX ADMIN — Medication 5 MILLIGRAM(S): at 22:51

## 2018-08-21 RX ADMIN — MIRTAZAPINE 7.5 MILLIGRAM(S): 45 TABLET, ORALLY DISINTEGRATING ORAL at 22:51

## 2018-08-21 RX ADMIN — Medication 240 MILLILITER(S): at 19:45

## 2018-08-21 RX ADMIN — NYSTATIN CREAM 1 APPLICATION(S): 100000 CREAM TOPICAL at 19:09

## 2018-08-21 RX ADMIN — Medication 75 MILLIGRAM(S): at 13:10

## 2018-08-21 RX ADMIN — ATORVASTATIN CALCIUM 40 MILLIGRAM(S): 80 TABLET, FILM COATED ORAL at 22:50

## 2018-08-21 RX ADMIN — Medication 650 MILLIGRAM(S): at 05:55

## 2018-08-21 RX ADMIN — Medication 3 MILLILITER(S): at 20:27

## 2018-08-21 RX ADMIN — Medication 100 MILLIGRAM(S): at 22:50

## 2018-08-21 RX ADMIN — Medication 75 MILLIGRAM(S): at 22:50

## 2018-08-21 RX ADMIN — Medication 2: at 13:04

## 2018-08-21 RX ADMIN — Medication 3 MILLILITER(S): at 09:12

## 2018-08-21 RX ADMIN — PANTOPRAZOLE SODIUM 40 MILLIGRAM(S): 20 TABLET, DELAYED RELEASE ORAL at 13:11

## 2018-08-21 RX ADMIN — SODIUM CHLORIDE 3 MILLILITER(S): 9 INJECTION INTRAMUSCULAR; INTRAVENOUS; SUBCUTANEOUS at 13:22

## 2018-08-21 NOTE — PROGRESS NOTE ADULT - ASSESSMENT
I feel that a colonoscopy is necessary at this point. I am aware that his INR is elevated, and he will need pre-colonoscopy antibiotics. I will order his prep now. Need repeat INR prior to the procedure. I will discuss specific antibiotics with the Hospitalist. Colonoscopy is for tomorrow morning.

## 2018-08-21 NOTE — PROGRESS NOTE ADULT - SUBJECTIVE AND OBJECTIVE BOX
The patient is a 83y old male who presents with a chief complaint of Anemia with occult blood   found in his stools. He is scheduled to have a COLONOSCOPY.      (17 Aug 2018 20:50)      PAST MEDICAL HISTORY:  L.V.E.F. = 75%+ TTE of 2018  Chronic pain  Insomnia  Diabetes  Mitral valve replaced  Pacemaker      PAST SURGICAL HISTORY:  S/P CABG (coronary artery bypass graft)      MEDICATIONS  (STANDING):  ALBUTerol/ipratropium for Nebulization 3 milliLiter(s) Nebulizer every 6 hours  allopurinol 150 milliGRAM(s) Oral daily  atorvastatin 40 milliGRAM(s) Oral at bedtime  bisacodyl 5 milliGRAM(s) Oral at bedtime  dextrose 5%. 1000 milliLiter(s) (50 mL/Hr) IV Continuous <Continuous>  dextrose 50% Injectable 12.5 Gram(s) IV Push once  dextrose 50% Injectable 25 Gram(s) IV Push once  dextrose 50% Injectable 25 Gram(s) IV Push once  docusate sodium 100 milliGRAM(s) Oral three times a day  insulin lispro (HumaLOG) corrective regimen sliding scale   SubCutaneous three times a day before meals  mirtazapine 7.5 milliGRAM(s) Oral at bedtime  nystatin Powder 1 Application(s) Topical two times a day  pantoprazole  Injectable 40 milliGRAM(s) IV Push daily  polyethylene glycol/electrolyte Solution 1000 milliLiter(s) Oral every 3 hours  pregabalin 75 milliGRAM(s) Oral three times a day  sodium chloride 0.9% lock flush 3 milliLiter(s) IV Push every 8 hours    MEDICATIONS  (PRN):  acetaminophen   Tablet. 650 milliGRAM(s) Oral every 6 hours PRN T > 100.4 or pain  benzocaine 15 mG/menthol 3.6 mG Lozenge 1 Lozenge Oral two times a day PRN Sore Throat  dextrose 40% Gel 15 Gram(s) Oral once PRN Blood Glucose LESS THAN 70 milliGRAM(s)/deciliter  glucagon  Injectable 1 milliGRAM(s) IntraMuscular once PRN Glucose LESS THAN 70 milligrams/deciliter  ondansetron    Tablet 4 milliGRAM(s) Oral every 6 hours PRN Nausea and/or Vomiting  ondansetron Injectable 4 milliGRAM(s) IV Push every 6 hours PRN Nausea      Allergies:     No Known Drug Allergies      SOCIAL HISTORY:  He drinks alcohol once in a while.  He quit smoking 3 years ago & he never                             used illicit drugs.                          8.7    7.3   )-----------( 143      ( 21 Aug 2018 06:55 )             26.4       PT/INR - ( 21 Aug 2018 06:55 )   PT: 34.7 sec;   INR: 3.08 ratio         PTT - ( 17 Aug 2018 15:10 )  PTT:39.8 sec        137  |  96<L>  |  22.0<H>  ----------------------------<  121<H>  3.5   |  28.0  |  1.31<H>    Ca    8.1<L>      21 Aug 2018 06:55  Mg     1.5     08    [id number]  Ventricular-paced rhythm  Abnormal ECG    TT ECHO:      ASA # =             Mallampati # = The patient is a 83y old male who presents with a chief complaint of Anemia with occult blood   found in his stools. He is scheduled to have a COLONOSCOPY.      (17 Aug 2018 20:50)  The patient is a very poor historian.    PAST MEDICAL HISTORY:  L.V.E.F. = 75%+ TTE of 2018  Chronic pain  Insomnia  Diabetes  Mitral valve replaced  Pacemaker      PAST SURGICAL HISTORY:  S/P CABG (coronary artery bypass graft)      MEDICATIONS  (STANDING):  ALBUTerol/ipratropium for Nebulization 3 milliLiter(s) Nebulizer every 6 hours  allopurinol 150 milliGRAM(s) Oral daily  atorvastatin 40 milliGRAM(s) Oral at bedtime  bisacodyl 5 milliGRAM(s) Oral at bedtime  dextrose 5%. 1000 milliLiter(s) (50 mL/Hr) IV Continuous <Continuous>  dextrose 50% Injectable 12.5 Gram(s) IV Push once  dextrose 50% Injectable 25 Gram(s) IV Push once  dextrose 50% Injectable 25 Gram(s) IV Push once  docusate sodium 100 milliGRAM(s) Oral three times a day  insulin lispro (HumaLOG) corrective regimen sliding scale   SubCutaneous three times a day before meals  mirtazapine 7.5 milliGRAM(s) Oral at bedtime  nystatin Powder 1 Application(s) Topical two times a day  pantoprazole  Injectable 40 milliGRAM(s) IV Push daily  polyethylene glycol/electrolyte Solution 1000 milliLiter(s) Oral every 3 hours  pregabalin 75 milliGRAM(s) Oral three times a day  sodium chloride 0.9% lock flush 3 milliLiter(s) IV Push every 8 hours    MEDICATIONS  (PRN):  acetaminophen   Tablet. 650 milliGRAM(s) Oral every 6 hours PRN T > 100.4 or pain  benzocaine 15 mG/menthol 3.6 mG Lozenge 1 Lozenge Oral two times a day PRN Sore Throat  dextrose 40% Gel 15 Gram(s) Oral once PRN Blood Glucose LESS THAN 70 milliGRAM(s)/deciliter  glucagon  Injectable 1 milliGRAM(s) IntraMuscular once PRN Glucose LESS THAN 70 milligrams/deciliter  ondansetron    Tablet 4 milliGRAM(s) Oral every 6 hours PRN Nausea and/or Vomiting  ondansetron Injectable 4 milliGRAM(s) IV Push every 6 hours PRN Nausea      Allergies:     No Known Drug Allergies      SOCIAL HISTORY:  He drinks alcohol once in a while.  He quit smoking 3 years ago & he never                             used illicit drugs.                          8.7    7.3   )-----------( 143      ( 21 Aug 2018 06:55 )             26.4       PT/INR - ( 21 Aug 2018 06:55 )   PT: 34.7 sec;   INR: 3.08 ratio         PTT - ( 17 Aug 2018 15:10 )  PTT:39.8 sec        137  |  96<L>  |  22.0<H>  ----------------------------<  121<H>  3.5   |  28.0  |  1.31<H>    Ca    8.1<L>      21 Aug 2018 06:55  Mg     1.5         [id number]  Ventricular-paced rhythm  Abnormal ECG    TT ECHO:  -  2018  Summary:   1. Technically difficult study.   2. Hyperdynamic global left ventricular systolic function. Left   ventricular ejection fraction, by visual estimation, is >75%.   3. Moderately increased LV wall thickness.   4. Normal RV size and systolic function.   5. A mechanical valve is present in the mitral position. Normal function.   6. Mild aortic regurgitation.   7. Moderate tricuspid regurgitation.   8. Mild pulmonic insufficiency.   9. Dilated aorta and pulmonary artery.  10. Estimated pulmonary artery systolic pressure is 42.9 mmHg assuming a   right atrial pressure of 3 mmHg, which is consistent with mild pulmonary   hypertension.    CT BRAIN  -  2018  FINDINGS:   There is no evidence of mass or acute intracranial hemorrhage. Chronic   left frontal lobe infarct with associated ex vacuo dilatation of the   frontal horn of the left lateral ventricle.      ASA # = 3   Mallampati # = [id number]

## 2018-08-21 NOTE — PROGRESS NOTE ADULT - ATTENDING COMMENTS
Patient is medically optimized for endoscopy.
PT evaluation ordered  Plan discussed in detail with patient's wife and daughter at the bedside
plan discussed in detial with family at the bedside

## 2018-08-21 NOTE — PROGRESS NOTE ADULT - SUBJECTIVE AND OBJECTIVE BOX
This patient is known to me from the office where I recently saw him for diarrhea and anemia. I have also seen and examined the patient this morning. I have reviewed this hospital record. In July his chief complaint was diarrhea, but this improved at follow up. We discussed a possible colonoscopy, especially in view of an abnormal Cologuard test from March. He and his wife refused the colonoscopy in July. However he has been admitted twice with anemia and possible GI blood loss. His baseline H&H is 11/34. He was recently 9?27, then 7/21. Stool is occult blood negative.INR is 3-4 on Coumadin. CT scan does not show bowel abnormality. He denies nausea, vomiting, abdominal pain or current diarrhea. He has normal bowel sounds and his abdomen is non tender. He cardenas been seen and cleared for colonoscopy by cardiology. He has PPM, CABG, and prosthetic mitral valve.

## 2018-08-21 NOTE — PHYSICAL THERAPY INITIAL EVALUATION ADULT - CRITERIA FOR SKILLED THERAPEUTIC INTERVENTIONS
impairments found/functional limitations in following categories/anticipated discharge recommendation/rehab potential

## 2018-08-21 NOTE — PROGRESS NOTE ADULT - ASSESSMENT
82 y/o M with PMH of CAD s/p CABG, DM II, s/p MVR on coumadin 3.5mg daily, and s/p PPM who presented to [facility] ED from [facility] with drop in H/H. No noted bleeding noted. Patient was recently admitted with GI bleed, no GI intervention done at that time. Patient was discharged to Rehab on 8/3/18. Per his Wife patient did not participate in rehab much. In the ER patient was hemodynamically stable. Hemoglobin 6.8, INR of 3.2. Patient was given IV fluids and admitted for anemia.  Transfused 2 units of PRBC.     1. Acute on chronic microcytic anemia: FOBT negative; no s/s of active bleeding at this time  Hb/hbct stable after transfusion  Spoke with Dr Bai, Gi. Given now second admission for anemia on AC will planned  for colonoscopy tomorrow. Will repeat pt/irn in am , and order prophylactic abx  ampicillin 2g IV X1  and gentamycin 60mg IV X1 to be given 9am tomorrow .       2. MARIBEL: Likely azotemia from anemia  Resolved    3. Mechanical mitral valve:  goal of INR at 2.5 to 3. INR now supra therapeutic Coumadin on hold. No s/s of active bleeding.   Repeat INR in AM.     Will discuss need for reversal and IV heparin with cardiology also possible need for pre-op antibiotics prior to colonoscopy with cardiology. Suf  Cardiac clearance provided by cardiology group    4. CAD Asymptomatic  not on ASA or plavix  continue metoprolol and statin

## 2018-08-21 NOTE — PROGRESS NOTE ADULT - ASSESSMENT
Assessment and Plan:  In summary, DAVID LUIS is a 83y man with significant history of HTN, DMII, HLD, CAD s/p remote CABG, mechanical MVR (2012), SSS s/p PPM (2009), moderate b/l carotid disease, and recent acute blood loss anemia with recent admission (GIB), sent to the ER for evaluation for anemia.    The patient has been chest pain free since admission.  Mildly elevated flat-trending troponin is nonspecific and moreover unchanged from recent admission; doubt acute MI.           Echocardiogram from 08/20/2018 reviewed as above.    No evidence of ischemia or CHF clinically, eventual ischemic evaluation (likely as outpatient).  The patient received a dose of lasix earlier with significant clinical and symptomatic  improvement.  Will hold off on additional lasix for now.  If additional transfusion required, would plan to dose lasix 40 mg post transfusion.     Acute anemia (?blood loss) with recent GIB The patient is s/p mechanical mitral valve replacement, maintained on coumadin with goal INR of 2.5-3.5.  This should be continued.  GI following regarding BRBPR.  There is no cardiac contraindication to proceeding with colonoscopy which may be performed as scheduled.

## 2018-08-21 NOTE — PROGRESS NOTE ADULT - SUBJECTIVE AND OBJECTIVE BOX
CC:   HPI:  82 y/o M with PMH of CAD s/p CABG, DM II, s/p MVR on coumadin 3.5mg daily, and s/p PPM who presented to [facility] ED from [facility] with drop in H/H. No noted bleeding noted. Patient was recently admitted with GI bleed, no GI intervention done at that time. Patient was discharged to Rehab on 8/3/18. Per his Wife patient did not participate in rehab much. In the ER patient was hemodynamically stable. Hemoglobin 6.8, INR of 3.2. Patient was given IV fluids and admitted for anemia. (17 Aug 2018 20:50)    REVIEW OF SYSTEMS:    Patient denied fever, chills, abdominal pain, nausea, vomiting, cough, shortness of breath, chest pain or palpitations    Vital Signs Last 24 Hrs  T(C): 36.6 (21 Aug 2018 05:39), Max: 36.6 (20 Aug 2018 15:07)  T(F): 97.9 (21 Aug 2018 05:39), Max: 97.9 (20 Aug 2018 15:07)  HR: 64 (21 Aug 2018 09:12) (62 - 91)  BP: 119/59 (21 Aug 2018 05:39) (92/50 - 119/59)  BP(mean): --  RR: 18 (21 Aug 2018 05:39) (18 - 18)  SpO2: 100% (21 Aug 2018 09:12) (92% - 100%)I&O's Summary    PHYSICAL EXAM:  GENERAL: NAD, well-groomed  HEENT: PERRL, +EOMI, anicteric, no [place]  NECK: Supple, No JVD   CHEST/LUNG: CTA bilaterally; Normal effort  HEART: S1S2 Normal intensity, no murmurs, gallops or rubs noted  ABDOMEN: Soft, BS Normoactive, NT, ND, no HSM noted  EXTREMITIES:  2+ radial and DP pulses noted, no clubbing, cyanosis, or edema noted, FROM x 4  SKIN: No rashes or lesions noted  NEURO: A&Ox3, no focal deficits noted, CN II-XII intact  PSYCH: normal mood and affect; insight/judgement appropriate  LABS:                        8.7    7.3   )-----------( 143      ( 21 Aug 2018 06:55 )             26.4     08-21    137  |  96<L>  |  22.0<H>  ----------------------------<  121<H>  3.5   |  28.0  |  1.31<H>    Ca    8.1<L>      21 Aug 2018 06:55  Mg     1.5     08-20      PT/INR - ( 21 Aug 2018 06:55 )   PT: 34.7 sec;   INR: 3.08 ratio             RADIOLOGY & ADDITIONAL TESTS:    MEDICATIONS:  MEDICATIONS  (STANDING):  ALBUTerol/ipratropium for Nebulization 3 milliLiter(s) Nebulizer every 6 hours  allopurinol 150 milliGRAM(s) Oral daily  atorvastatin 40 milliGRAM(s) Oral at bedtime  bisacodyl 5 milliGRAM(s) Oral at bedtime  dextrose 5%. 1000 milliLiter(s) (50 mL/Hr) IV Continuous <Continuous>  dextrose 50% Injectable 12.5 Gram(s) IV Push once  dextrose 50% Injectable 25 Gram(s) IV Push once  dextrose 50% Injectable 25 Gram(s) IV Push once  docusate sodium 100 milliGRAM(s) Oral three times a day  insulin lispro (HumaLOG) corrective regimen sliding scale   SubCutaneous three times a day before meals  magnesium citrate Solution 240 milliLiter(s) Oral once  mirtazapine 7.5 milliGRAM(s) Oral at bedtime  nystatin Powder 1 Application(s) Topical two times a day  pantoprazole  Injectable 40 milliGRAM(s) IV Push daily  phytonadione   Solution 5 milliGRAM(s) Oral once  polyethylene glycol/electrolyte Solution 1000 milliLiter(s) Oral every 3 hours  pregabalin 75 milliGRAM(s) Oral three times a day  sodium chloride 0.9% lock flush 3 milliLiter(s) IV Push every 8 hours    MEDICATIONS  (PRN):  acetaminophen   Tablet. 650 milliGRAM(s) Oral every 6 hours PRN T > 100.4 or pain  benzocaine 15 mG/menthol 3.6 mG Lozenge 1 Lozenge Oral two times a day PRN Sore Throat  dextrose 40% Gel 15 Gram(s) Oral once PRN Blood Glucose LESS THAN 70 milliGRAM(s)/deciliter  glucagon  Injectable 1 milliGRAM(s) IntraMuscular once PRN Glucose LESS THAN 70 milligrams/deciliter  ondansetron    Tablet 4 milliGRAM(s) Oral every 6 hours PRN Nausea and/or Vomiting  ondansetron Injectable 4 milliGRAM(s) IV Push every 6 hours PRN Nausea CC: Mitral valve repair on coumadin. Blood loss anemia , planned for Colonoscopy tomorrow.   HPI:  82 y/o M with PMH of CAD s/p CABG, DM II, s/p MVR on coumadin 3.5mg daily, and s/p PPM who presented to [facility] ED from [facility] with drop in H/H. No noted bleeding noted. Patient was recently admitted with GI bleed, no GI intervention done at that time. Patient was discharged to Rehab on 8/3/18. Per his Wife patient did not participate in rehab much. In the ER patient was hemodynamically stable. Hemoglobin 6.8, INR of 3.2. Patient was given IV fluids and admitted for anemia. (17 Aug 2018 20:50)    REVIEW OF SYSTEMS:    Patient denied fever, chills, abdominal pain, nausea, vomiting, cough, shortness of breath, chest pain or palpitations    Vital Signs Last 24 Hrs  T(C): 36.6 (21 Aug 2018 05:39), Max: 36.6 (20 Aug 2018 15:07)  T(F): 97.9 (21 Aug 2018 05:39), Max: 97.9 (20 Aug 2018 15:07)  HR: 64 (21 Aug 2018 09:12) (62 - 91)  BP: 119/59 (21 Aug 2018 05:39) (92/50 - 119/59)  BP(mean): --  RR: 18 (21 Aug 2018 05:39) (18 - 18)  SpO2: 100% (21 Aug 2018 09:12) (92% - 100%)I&O's Summary    PHYSICAL EXAM:  GENERAL: NAD,  HEENT: PERRL, +EOMI, anicteric, no [place]  NECK: Supple, No JVD   CHEST/LUNG: CTA bilaterally; Normal effort  HEART: S1S2 Normal intensity, no murmurs, gallops or rubs noted  ABDOMEN: Soft, BS Normoactive, NT, ND, no HSM noted  EXTREMITIES:  2+ radial and DP pulses noted, no clubbing, cyanosis, or edema noted,   SKIN: No rashes or lesions noted  NEURO: A&Ox3, no focal deficits noted, CN II-XII intact  PSYCH: Depressed mood and affect; insight/judgement appropriate  LABS:                        8.7    7.3   )-----------( 143      ( 21 Aug 2018 06:55 )             26.4     08-21    137  |  96<L>  |  22.0<H>  ----------------------------<  121<H>  3.5   |  28.0  |  1.31<H>    Ca    8.1<L>      21 Aug 2018 06:55  Mg     1.5     08-20      PT/INR - ( 21 Aug 2018 06:55 )   PT: 34.7 sec;   INR: 3.08 ratio             RADIOLOGY & ADDITIONAL TESTS:    MEDICATIONS:  MEDICATIONS  (STANDING):  ALBUTerol/ipratropium for Nebulization 3 milliLiter(s) Nebulizer every 6 hours  allopurinol 150 milliGRAM(s) Oral daily  atorvastatin 40 milliGRAM(s) Oral at bedtime  bisacodyl 5 milliGRAM(s) Oral at bedtime  dextrose 5%. 1000 milliLiter(s) (50 mL/Hr) IV Continuous <Continuous>  dextrose 50% Injectable 12.5 Gram(s) IV Push once  dextrose 50% Injectable 25 Gram(s) IV Push once  dextrose 50% Injectable 25 Gram(s) IV Push once  docusate sodium 100 milliGRAM(s) Oral three times a day  insulin lispro (HumaLOG) corrective regimen sliding scale   SubCutaneous three times a day before meals  magnesium citrate Solution 240 milliLiter(s) Oral once  mirtazapine 7.5 milliGRAM(s) Oral at bedtime  nystatin Powder 1 Application(s) Topical two times a day  pantoprazole  Injectable 40 milliGRAM(s) IV Push daily  phytonadione   Solution 5 milliGRAM(s) Oral once  polyethylene glycol/electrolyte Solution 1000 milliLiter(s) Oral every 3 hours  pregabalin 75 milliGRAM(s) Oral three times a day  sodium chloride 0.9% lock flush 3 milliLiter(s) IV Push every 8 hours    MEDICATIONS  (PRN):  acetaminophen   Tablet. 650 milliGRAM(s) Oral every 6 hours PRN T > 100.4 or pain  benzocaine 15 mG/menthol 3.6 mG Lozenge 1 Lozenge Oral two times a day PRN Sore Throat  dextrose 40% Gel 15 Gram(s) Oral once PRN Blood Glucose LESS THAN 70 milliGRAM(s)/deciliter  glucagon  Injectable 1 milliGRAM(s) IntraMuscular once PRN Glucose LESS THAN 70 milligrams/deciliter  ondansetron    Tablet 4 milliGRAM(s) Oral every 6 hours PRN Nausea and/or Vomiting  ondansetron Injectable 4 milliGRAM(s) IV Push every 6 hours PRN Nausea

## 2018-08-21 NOTE — PROGRESS NOTE ADULT - SUBJECTIVE AND OBJECTIVE BOX
INTERVAL HISTORY:  Denies CP, SOB, palpitation.  	  MEDICATIONS:  ALBUTerol/ipratropium for Nebulization 3 milliLiter(s) Nebulizer every 6 hours  acetaminophen   Tablet. 650 milliGRAM(s) Oral every 6 hours PRN  mirtazapine 7.5 milliGRAM(s) Oral at bedtime  ondansetron    Tablet 4 milliGRAM(s) Oral every 6 hours PRN  ondansetron Injectable 4 milliGRAM(s) IV Push every 6 hours PRN  pregabalin 75 milliGRAM(s) Oral three times a day  bisacodyl 5 milliGRAM(s) Oral at bedtime  docusate sodium 100 milliGRAM(s) Oral three times a day  magnesium citrate Solution 240 milliLiter(s) Oral once  pantoprazole  Injectable 40 milliGRAM(s) IV Push daily  polyethylene glycol/electrolyte Solution 1000 milliLiter(s) Oral every 3 hours  allopurinol 150 milliGRAM(s) Oral daily  atorvastatin 40 milliGRAM(s) Oral at bedtime  dextrose 40% Gel 15 Gram(s) Oral once PRN  dextrose 50% Injectable 12.5 Gram(s) IV Push once  dextrose 50% Injectable 25 Gram(s) IV Push once  dextrose 50% Injectable 25 Gram(s) IV Push once  glucagon  Injectable 1 milliGRAM(s) IntraMuscular once PRN  insulin lispro (HumaLOG) corrective regimen sliding scale   SubCutaneous three times a day before meals  benzocaine 15 mG/menthol 3.6 mG Lozenge 1 Lozenge Oral two times a day PRN  dextrose 5%. 1000 milliLiter(s) IV Continuous <Continuous>  nystatin Powder 1 Application(s) Topical two times a day  phytonadione   Solution 5 milliGRAM(s) Oral once  sodium chloride 0.9% lock flush 3 milliLiter(s) IV Push every 8 hours        PHYSICAL EXAM:  T(C): 36.6 (08-21-18 @ 05:39), Max: 36.6 (08-20-18 @ 15:07)  HR: 65 (08-21-18 @ 05:39) (62 - 91)  BP: 119/59 (08-21-18 @ 05:39) (92/50 - 119/59)  RR: 18 (08-21-18 @ 05:39) (18 - 18)  SpO2: 100% (08-21-18 @ 05:39) (92% - 100%)  Wt(kg): --  I&O's Summary        Appearance: Normal	  HEENT:   Normal oral mucosa  Cardiovascular: Normal S1 S2, No JVD, No murmurs, No edema  Respiratory: Lungs clear to auscultation	  Psychiatry: A & O x 3, Mood & affect appropriate  Gastrointestinal:  Soft, Non-tender, + BS	  Skin: No rashes, No ecchymoses, No cyanosis  Neurologic: Non-focal  Extremities: Normal range of motion, No clubbing, cyanosis or edema  Vascular: Peripheral pulses palpable 2+ bilaterally    TELEMETRY: V paced	      Echocardiogram:  Nl LV EF, mild AI, nl mechanical MV fx    LABS:	 	                        8.7    7.3   )-----------( 143      ( 21 Aug 2018 06:55 )             26.4     08-21    137  |  96<L>  |  22.0<H>  ----------------------------<  121<H>  3.5   |  28.0  |  1.31<H>    Ca    8.1<L>      21 Aug 2018 06:55  Mg     1.5     08-20	    NUCLEAR STRESS TEST (09/2016): normal rest-stress myocardial perfusion; normal LV function EF 76%.

## 2018-08-22 LAB
ANION GAP SERPL CALC-SCNC: 13 MMOL/L — SIGNIFICANT CHANGE UP (ref 5–17)
BUN SERPL-MCNC: 19 MG/DL — SIGNIFICANT CHANGE UP (ref 8–20)
CALCIUM SERPL-MCNC: 8 MG/DL — LOW (ref 8.6–10.2)
CHLORIDE SERPL-SCNC: 101 MMOL/L — SIGNIFICANT CHANGE UP (ref 98–107)
CO2 SERPL-SCNC: 30 MMOL/L — HIGH (ref 22–29)
CREAT SERPL-MCNC: 1.15 MG/DL — SIGNIFICANT CHANGE UP (ref 0.5–1.3)
GLUCOSE BLDC GLUCOMTR-MCNC: 113 MG/DL — HIGH (ref 70–99)
GLUCOSE BLDC GLUCOMTR-MCNC: 127 MG/DL — HIGH (ref 70–99)
GLUCOSE BLDC GLUCOMTR-MCNC: 143 MG/DL — HIGH (ref 70–99)
GLUCOSE BLDC GLUCOMTR-MCNC: 154 MG/DL — HIGH (ref 70–99)
GLUCOSE SERPL-MCNC: 124 MG/DL — HIGH (ref 70–115)
HCT VFR BLD CALC: 27.1 % — LOW (ref 42–52)
HGB BLD-MCNC: 8.7 G/DL — LOW (ref 14–18)
INR BLD: 2.73 RATIO — HIGH (ref 0.88–1.16)
MAGNESIUM SERPL-MCNC: 2 MG/DL — SIGNIFICANT CHANGE UP (ref 1.6–2.6)
MCHC RBC-ENTMCNC: 29.8 PG — SIGNIFICANT CHANGE UP (ref 27–31)
MCHC RBC-ENTMCNC: 32.1 G/DL — SIGNIFICANT CHANGE UP (ref 32–36)
MCV RBC AUTO: 92.8 FL — SIGNIFICANT CHANGE UP (ref 80–94)
PLATELET # BLD AUTO: 159 K/UL — SIGNIFICANT CHANGE UP (ref 150–400)
POTASSIUM SERPL-MCNC: 3.9 MMOL/L — SIGNIFICANT CHANGE UP (ref 3.5–5.3)
POTASSIUM SERPL-SCNC: 3.9 MMOL/L — SIGNIFICANT CHANGE UP (ref 3.5–5.3)
PROTHROM AB SERPL-ACNC: 30.7 SEC — HIGH (ref 9.8–12.7)
RBC # BLD: 2.92 M/UL — LOW (ref 4.6–6.2)
RBC # FLD: 20.2 % — HIGH (ref 11–15.6)
SODIUM SERPL-SCNC: 144 MMOL/L — SIGNIFICANT CHANGE UP (ref 135–145)
WBC # BLD: 5.9 K/UL — SIGNIFICANT CHANGE UP (ref 4.8–10.8)
WBC # FLD AUTO: 5.9 K/UL — SIGNIFICANT CHANGE UP (ref 4.8–10.8)

## 2018-08-22 PROCEDURE — 99222 1ST HOSP IP/OBS MODERATE 55: CPT

## 2018-08-22 PROCEDURE — 99233 SBSQ HOSP IP/OBS HIGH 50: CPT

## 2018-08-22 RX ORDER — FUROSEMIDE 40 MG
40 TABLET ORAL DAILY
Qty: 0 | Refills: 0 | Status: DISCONTINUED | OUTPATIENT
Start: 2018-08-22 | End: 2018-08-23

## 2018-08-22 RX ADMIN — Medication 1000 MILLILITER(S): at 01:04

## 2018-08-22 RX ADMIN — SODIUM CHLORIDE 3 MILLILITER(S): 9 INJECTION INTRAMUSCULAR; INTRAVENOUS; SUBCUTANEOUS at 13:51

## 2018-08-22 RX ADMIN — ATORVASTATIN CALCIUM 40 MILLIGRAM(S): 80 TABLET, FILM COATED ORAL at 22:36

## 2018-08-22 RX ADMIN — Medication 100 MILLIGRAM(S): at 22:36

## 2018-08-22 RX ADMIN — PANTOPRAZOLE SODIUM 40 MILLIGRAM(S): 20 TABLET, DELAYED RELEASE ORAL at 11:41

## 2018-08-22 RX ADMIN — Medication 3 MILLILITER(S): at 08:51

## 2018-08-22 RX ADMIN — Medication 150 MILLIGRAM(S): at 11:41

## 2018-08-22 RX ADMIN — NYSTATIN CREAM 1 APPLICATION(S): 100000 CREAM TOPICAL at 18:06

## 2018-08-22 RX ADMIN — SODIUM CHLORIDE 3 MILLILITER(S): 9 INJECTION INTRAMUSCULAR; INTRAVENOUS; SUBCUTANEOUS at 22:36

## 2018-08-22 RX ADMIN — MIRTAZAPINE 7.5 MILLIGRAM(S): 45 TABLET, ORALLY DISINTEGRATING ORAL at 22:36

## 2018-08-22 RX ADMIN — Medication 100 MILLIGRAM(S): at 06:02

## 2018-08-22 RX ADMIN — Medication 5 MILLIGRAM(S): at 22:36

## 2018-08-22 RX ADMIN — SODIUM CHLORIDE 3 MILLILITER(S): 9 INJECTION INTRAMUSCULAR; INTRAVENOUS; SUBCUTANEOUS at 05:56

## 2018-08-22 RX ADMIN — Medication 3 MILLILITER(S): at 20:20

## 2018-08-22 RX ADMIN — NYSTATIN CREAM 1 APPLICATION(S): 100000 CREAM TOPICAL at 06:02

## 2018-08-22 RX ADMIN — Medication 75 MILLIGRAM(S): at 22:36

## 2018-08-22 RX ADMIN — Medication 75 MILLIGRAM(S): at 06:02

## 2018-08-22 RX ADMIN — Medication 3 MILLILITER(S): at 15:43

## 2018-08-22 NOTE — PROGRESS NOTE ADULT - SUBJECTIVE AND OBJECTIVE BOX
CC: Anemia     INTERVAL HPI/OVERNIGHT EVENTS: Patient seen and examined, noted hypoxia on room air requiring 5 liters via nasal cannula. Noted to desat during the night. Sitting comfortably in bed. Denies chest pain, sob or pressure. Afebrile. Denies abdominal pain, nausea or vomiting.       Vital Signs Last 24 Hrs  T(C): 36.7 (22 Aug 2018 11:35), Max: 36.7 (22 Aug 2018 11:35)  T(F): 98 (22 Aug 2018 11:35), Max: 98 (22 Aug 2018 11:35)  HR: 71 (22 Aug 2018 11:35) (61 - 74)  BP: 118/50 (22 Aug 2018 11:35) (106/66 - 138/55)  BP(mean): --  RR: 18 (22 Aug 2018 11:35) (18 - 18)  SpO2: 95% (22 Aug 2018 11:35) (92% - 100%)    PHYSICAL EXAM:    GENERAL: NAD, AOx2  HEAD:  Atraumatic, Normocephalic  EYES: EOMI, PERRLA, conjunctiva and sclera clear  ENMT: Moist mucous membranes  NECK: Supple, No JVD  CHEST/LUNG: Crackles audible bilateral bases  HEART: Regular rate and rhythm; No murmurs, rubs, or gallops  ABDOMEN: Soft, Nontender, Nondistended; Bowel sounds present  EXTREMITIES:  2+ Peripheral Pulses, No clubbing, cyanosis, or edema        MEDICATIONS  (STANDING):  ALBUTerol/ipratropium for Nebulization 3 milliLiter(s) Nebulizer every 6 hours  allopurinol 150 milliGRAM(s) Oral daily  ampicillin  IVPB 2 Gram(s) IV Intermittent once  atorvastatin 40 milliGRAM(s) Oral at bedtime  bisacodyl 5 milliGRAM(s) Oral at bedtime  dextrose 5%. 1000 milliLiter(s) (50 mL/Hr) IV Continuous <Continuous>  dextrose 50% Injectable 12.5 Gram(s) IV Push once  dextrose 50% Injectable 25 Gram(s) IV Push once  dextrose 50% Injectable 25 Gram(s) IV Push once  docusate sodium 100 milliGRAM(s) Oral three times a day  gentamicin   IVPB 80 milliGRAM(s) IV Intermittent once  insulin lispro (HumaLOG) corrective regimen sliding scale   SubCutaneous three times a day before meals  mirtazapine 7.5 milliGRAM(s) Oral at bedtime  nystatin Powder 1 Application(s) Topical two times a day  pantoprazole  Injectable 40 milliGRAM(s) IV Push daily  pregabalin 75 milliGRAM(s) Oral three times a day  sodium chloride 0.9% lock flush 3 milliLiter(s) IV Push every 8 hours    MEDICATIONS  (PRN):  acetaminophen   Tablet. 650 milliGRAM(s) Oral every 6 hours PRN T > 100.4 or pain  benzocaine 15 mG/menthol 3.6 mG Lozenge 1 Lozenge Oral two times a day PRN Sore Throat  dextrose 40% Gel 15 Gram(s) Oral once PRN Blood Glucose LESS THAN 70 milliGRAM(s)/deciliter  glucagon  Injectable 1 milliGRAM(s) IntraMuscular once PRN Glucose LESS THAN 70 milligrams/deciliter  ondansetron    Tablet 4 milliGRAM(s) Oral every 6 hours PRN Nausea and/or Vomiting  ondansetron Injectable 4 milliGRAM(s) IV Push every 6 hours PRN Nausea      Allergies    No Known Allergies    Intolerances          LABS:                          8.7    5.9   )-----------( 159      ( 22 Aug 2018 05:37 )             27.1     08-22    144  |  101  |  19.0  ----------------------------<  124<H>  3.9   |  30.0<H>  |  1.15    Ca    8.0<L>      22 Aug 2018 05:37  Mg     2.0     08-22      PT/INR - ( 22 Aug 2018 05:37 )   PT: 30.7 sec;   INR: 2.73 ratio               RADIOLOGY & ADDITIONAL TESTS:

## 2018-08-22 NOTE — PROGRESS NOTE ADULT - SUBJECTIVE AND OBJECTIVE BOX
INTERVAL HISTORY:  Denies CP,SOB, palpitation.  	  MEDICATIONS:  ampicillin  IVPB 2 Gram(s) IV Intermittent once  gentamicin   IVPB 80 milliGRAM(s) IV Intermittent once  ALBUTerol/ipratropium for Nebulization 3 milliLiter(s) Nebulizer every 6 hours  acetaminophen   Tablet. 650 milliGRAM(s) Oral every 6 hours PRN  mirtazapine 7.5 milliGRAM(s) Oral at bedtime  ondansetron    Tablet 4 milliGRAM(s) Oral every 6 hours PRN  ondansetron Injectable 4 milliGRAM(s) IV Push every 6 hours PRN  pregabalin 75 milliGRAM(s) Oral three times a day  bisacodyl 5 milliGRAM(s) Oral at bedtime  docusate sodium 100 milliGRAM(s) Oral three times a day  pantoprazole  Injectable 40 milliGRAM(s) IV Push daily  allopurinol 150 milliGRAM(s) Oral daily  atorvastatin 40 milliGRAM(s) Oral at bedtime  dextrose 40% Gel 15 Gram(s) Oral once PRN  dextrose 50% Injectable 12.5 Gram(s) IV Push once  dextrose 50% Injectable 25 Gram(s) IV Push once  dextrose 50% Injectable 25 Gram(s) IV Push once  glucagon  Injectable 1 milliGRAM(s) IntraMuscular once PRN  insulin lispro (HumaLOG) corrective regimen sliding scale   SubCutaneous three times a day before meals  benzocaine 15 mG/menthol 3.6 mG Lozenge 1 Lozenge Oral two times a day PRN  dextrose 5%. 1000 milliLiter(s) IV Continuous <Continuous>  nystatin Powder 1 Application(s) Topical two times a day  sodium chloride 0.9% lock flush 3 milliLiter(s) IV Push every 8 hours        PHYSICAL EXAM:  T(C): 36.5 (08-22-18 @ 05:48), Max: 36.5 (08-22-18 @ 05:48)  HR: 68 (08-22-18 @ 08:51) (61 - 74)  BP: 112/55 (08-22-18 @ 05:48) (106/66 - 138/55)  RR: 18 (08-22-18 @ 05:48) (18 - 18)  SpO2: 96% (08-22-18 @ 08:51) (92% - 100%)  Wt(kg): --  I&O's Summary        Appearance: Normal	  HEENT:   Normal oral mucosa  Cardiovascular: Normal mechanical tone.  Respiratory: Lungs clear to auscultation	  Psychiatry: A & O x 3, Mood & affect appropriate  Gastrointestinal:  Soft, Non-tender, + BS	  Skin: No rashes, No ecchymoses, No cyanosis  Neurologic: Non-focal  Extremities: Normal range of motion, No clubbing, cyanosis or edema  Vascular: diminished LE pulse    TELEMETRY: Paced, AF	      LABS:	 	                        8.7    5.9   )-----------( 159      ( 22 Aug 2018 05:37 )             27.1     08-22    144  |  101  |  19.0  ----------------------------<  124<H>  3.9   |  30.0<H>  |  1.15    Ca    8.0<L>      22 Aug 2018 05:37  Mg     2.0     08-22      ASSESSMENT/PLAN: DAVID LUIS is a 83y man with significant history of HTN, DMII, HLD, CAD s/p remote CABG, mechanical MVR (2012), SSS s/p PPM (2009), moderate b/l carotid disease, and recent acute blood loss anemia with recent admission (GIB), sent to the ER for evaluation for anemia.    The patient has been chest pain free since admission.  Mildly elevated flat-trending troponin is nonspecific and moreover unchanged from recent admission; doubt acute MI.           Echocardiogram from 08/20/2018 reviewed as above.    No evidence of ischemia or CHF clinically, eventual ischemic evaluation (likely as outpatient).  The patient received a dose of lasix earlier with significant clinical and symptomatic  improvement.  Will hold off on additional lasix for now.  If additional transfusion required, would plan to dose lasix 40 mg post transfusion.     Acute anemia (?blood loss) with recent GIB The patient is s/p mechanical mitral valve replacement, maintained on coumadin with goal INR of 2.5-3.5.  This should be continued.  GI following regarding BRBPR.  There is no cardiac contraindication to proceeding with colonoscopy which may be performed as scheduled.

## 2018-08-22 NOTE — CONSULT NOTE ADULT - SUBJECTIVE AND OBJECTIVE BOX
PULMONARY CONSULT NOTE      [id number]    Patient is a 83y old  Male who presents with a chief complaint of Anemia (17 Aug 2018 20:50)      HISTORY OF PRESENT ILLNESS: Hx mainly from chart. PMH of CAD s/p CABG, DM II, s/p MVR on coumadin 3.5mg daily, and s/p PPM who presented to [facility] ED from [facility] with drop in H/H. No noted bleeding noted. Patient was recently admitted with GI bleed, no GI intervention done at that time. Patient was discharged to Rehab on 8/3/18. Per his Wife patient did not participate in rehab much. In the ER patient was hemodynamically stable. Hemoglobin 6.8, INR of 3.2. Patient was given IV fluids and admitted for anemia.  Transfused 2 units of PRBC.  FOBT negative. Noted to have hypoxia with respiratory distress. Chest xray consistent wiht bilateral effusions. Echocardiogram with diastolic dysfunction; normal LVSF. Improved with dose of IV lasix but still hypoxic.  Spoke with GI, pending colonoscopy. He does not seem to be a reliable historian. Says he has 1-2 coughs. Denies SOB. Says he does not feel well lying flat. He reports he smoked until his heart surgery. Per staff, he also has hx of TREV but did not want CPAP.      MEDICATIONS  (STANDING):  ALBUTerol/ipratropium for Nebulization 3 milliLiter(s) Nebulizer every 6 hours  allopurinol 150 milliGRAM(s) Oral daily  ampicillin  IVPB 2 Gram(s) IV Intermittent once  atorvastatin 40 milliGRAM(s) Oral at bedtime  bisacodyl 5 milliGRAM(s) Oral at bedtime  dextrose 5%. 1000 milliLiter(s) (50 mL/Hr) IV Continuous <Continuous>  dextrose 50% Injectable 12.5 Gram(s) IV Push once  dextrose 50% Injectable 25 Gram(s) IV Push once  dextrose 50% Injectable 25 Gram(s) IV Push once  docusate sodium 100 milliGRAM(s) Oral three times a day  gentamicin   IVPB 80 milliGRAM(s) IV Intermittent once  insulin lispro (HumaLOG) corrective regimen sliding scale   SubCutaneous three times a day before meals  mirtazapine 7.5 milliGRAM(s) Oral at bedtime  nystatin Powder 1 Application(s) Topical two times a day  pantoprazole  Injectable 40 milliGRAM(s) IV Push daily  pregabalin 75 milliGRAM(s) Oral three times a day  sodium chloride 0.9% lock flush 3 milliLiter(s) IV Push every 8 hours      MEDICATIONS  (PRN):  acetaminophen   Tablet. 650 milliGRAM(s) Oral every 6 hours PRN T > 100.4 or pain  benzocaine 15 mG/menthol 3.6 mG Lozenge 1 Lozenge Oral two times a day PRN Sore Throat  dextrose 40% Gel 15 Gram(s) Oral once PRN Blood Glucose LESS THAN 70 milliGRAM(s)/deciliter  glucagon  Injectable 1 milliGRAM(s) IntraMuscular once PRN Glucose LESS THAN 70 milligrams/deciliter  ondansetron    Tablet 4 milliGRAM(s) Oral every 6 hours PRN Nausea and/or Vomiting  ondansetron Injectable 4 milliGRAM(s) IV Push every 6 hours PRN Nausea      Allergies    No Known Allergies    Intolerances        PAST MEDICAL & SURGICAL HISTORY:  Chronic pain  Insomnia  Diabetes  Mitral valve replaced  Pacemaker  S/P CABG (coronary artery bypass graft)      FAMILY HISTORY:  No pertinent family history in first degree relatives      SOCIAL HISTORY  Smoking History: former smoker    REVIEW OF SYSTEMS:    attempted; unreliable    Vital Signs Last 24 Hrs  T(C): 36.7 (22 Aug 2018 11:35), Max: 36.7 (22 Aug 2018 11:35)  T(F): 98 (22 Aug 2018 11:35), Max: 98 (22 Aug 2018 11:35)  HR: 71 (22 Aug 2018 11:35) (61 - 74)  BP: 118/50 (22 Aug 2018 11:35) (106/66 - 138/55)  BP(mean): --  RR: 18 (22 Aug 2018 11:35) (18 - 18)  SpO2: 95% (22 Aug 2018 11:35) (92% - 100%)    PHYSICAL EXAMINATION:    GENERAL: The patient is  in no apparent distress.     HEENT: Head is normocephalic and atraumatic.   Mucous membranes are moist.     NECK: Supple.     LUNGS: Crackles 1/3 up b/l, no wheeze, respirations unlabored    HEART: Regular rate and rhythm; systolic murmur, 'click'    ABDOMEN: Soft, nontender, and nondistended.       EXTREMITIES: Without any cyanosis, clubbing, rash, lesions' 2+ edema b/l.    NEUROLOGIC: Grossly intact.      LABS:                        8.7    5.9   )-----------( 159      ( 22 Aug 2018 05:37 )             27.1     08-22    144  |  101  |  19.0  ----------------------------<  124<H>  3.9   |  30.0<H>  |  1.15    Ca    8.0<L>      22 Aug 2018 05:37  Mg     2.0     08-22      PT/INR - ( 22 Aug 2018 05:37 )   PT: 30.7 sec;   INR: 2.73 ratio              Serum Pro-Brain Natriuretic Peptide: 4433 pg/mL (08-21-18 @ 06:55)               RADIOLOGY & ADDITIONAL STUDIES:  < from: Xray Chest 2 Views PA/Lat (08.18.18 @ 15:15) >     EXAM:  XR CHEST PA LAT 2V                          PROCEDURE DATE:  08/18/2018          INTERPRETATION:  Exam Date: 8/18/2018 3:15 PM    History: Shortness of breath    Technique: Frontal and lateral views of the chest with comparison to    7/30/2018    Findings:    Left-sided pacemaker. Prior sternotomy and valve replacement. Heart is   enlarged. Interval development of bilateral increased interstitial   markings predominantly in the lung bases right slightly greater than left   with bilateral pleural effusions likely representing pulmonary edema,   clinically correlate for superimposed pneumonia. Degenerative changes of   the visualized osseous structures.    Impression:     Interval development of bilateral increased interstitial markings   predominantly in the lung bases right slightly greater than left with   bilateral pleural effusions likely representing pulmonary edema,   clinically correlate for superimposed pneumonia                AL BECKFORD M.D., ATTENDING RADIOLOGIST    < end of copied text >
83 yr old male w GI bleed on coumadin, CAD s/p CABG, Dementia, Depression, DM II, s/p mechanical MVR, PPM who presents from [facility] by EMS to [facility] ED for low hemoglobin 6.38    Patient is unable to provide hx.  Cannot recall events    Pt known to me from last admission when he presented w blood per rectum.  Did not have colonoscopy last admission.  Labs revealed Hb 6.38 and he was sent to the ED on 3 L NC for evaluation       In ED exam unremarkable w guaaic neg brown stool reported and Hb 6.8.  Consent for blood transfusion obtained from wife.  Called his wife who reported that patient has not been eating at rehab, only eating a few things that he wants.  He has gotten weaker and so pale.  She indicated he was diagnosed w dementia and depression and just started remeron last week.    Seen By GI Dr. Juju Curarn last admission    PAST MED HX  Anaphylaxis to wasps  CAD s/p CABG  Dementia  Depression   DM II  Diarrhea x 6 weeks CTA outpt neg  GI bleed  Hyperlipidemia HLD  Lung nodule (3 cm seen on CT) w mild COPD  s/p MVR on coumadin  s/p PPM    PAST SURG HX  CABG 1996  PPM 10 yrs ago  MVR 5 yrs ago    FAM HX  Mother w DM  no hx CAD, HTN, ulcers, colon CA    SOCIAL HX    lives w wife, currently at rehab since 08-  nonsmoker    ROS  Unable to obtain as patient answers :I do not really know nydia"  Did deny chest and abdominal pain        PHYSICAL EXAM: Tele-evaluation precludes physical exam. Pertinent physical exam findings     Patient appears frail,  pale, wearing nasal cannula in NAD during exam  Alert, oriented to self, does remember me he states    Vital Signs Last 24 Hrs  T(C): 36.6 (17 Aug 2018 12:49), Max: 36.6 (17 Aug 2018 12:49)  T(F): 97.8 (17 Aug 2018 12:49), Max: 97.8 (17 Aug 2018 12:49)  HR: 98 (17 Aug 2018 12:49) (98 - 98)  BP: 137/54 (17 Aug 2018 12:49) (137/54 - 137/54)  BP(mean): --  RR: 18 (17 Aug 2018 12:49) (18 - 18)  SpO2: 98% (17 Aug 2018 12:49) (98% - 98%)    LABS AND IMAGING DATA:                        6.8    5.8   )-----------( 143      ( 17 Aug 2018 15:10 )             20.7     08-17    138  |  100  |  43.0<H>  ----------------------------<  108  5.0   |  24.0  |  1.71<H>    Ca    8.2<L>      17 Aug 2018 15:10    TPro  6.4<L>  /  Alb  2.7<L>  /  TBili  1.0  /  DBili  x   /  AST  35  /  ALT  12  /  AlkPhos  76  08-17
[facility]                                          [address], [address], [place] [address]                                               PHONE: [phone]    FAX: [phone] [address], [address], [place] [address]                                       PHONE: [phone]    FAX: [phone]  *******************************************************************************    Reason for Consult: Metallic valve/anemia    HPI:   DAVID LUIS is a 83y man with significant history of HTN, DMII, HLD, CAD s/p remote CABG, mechanical MVR (2012), SSS s/p PPM (2009), moderate b/l carotid disease, and recent acute blood loss anemia with recent admission (GIB), sent to the ER for evaluation for anemia. The patient denies any chest pain, palpitations, shortness of breath or difficulty breathing.  No syncope or near syncope.  He denies any bleeding, BRBPR, black or tarry stool.     PAST MEDICAL & SURGICAL HISTORY:  Chronic pain  Insomnia  Diabetes  Mitral valve replaced  Pacemaker  S/P CABG (coronary artery bypass graft)      No Known Allergies      MEDICATIONS  (STANDING):  ALBUTerol/ipratropium for Nebulization 3 milliLiter(s) Nebulizer every 6 hours  allopurinol 150 milliGRAM(s) Oral daily  atorvastatin 40 milliGRAM(s) Oral at bedtime  dextrose 5%. 1000 milliLiter(s) (50 mL/Hr) IV Continuous <Continuous>  dextrose 50% Injectable 12.5 Gram(s) IV Push once  dextrose 50% Injectable 25 Gram(s) IV Push once  dextrose 50% Injectable 25 Gram(s) IV Push once  docusate sodium 100 milliGRAM(s) Oral three times a day  insulin lispro (HumaLOG) corrective regimen sliding scale   SubCutaneous three times a day before meals  mirtazapine 7.5 milliGRAM(s) Oral at bedtime  nystatin Powder 1 Application(s) Topical two times a day  pantoprazole  Injectable 40 milliGRAM(s) IV Push daily  pregabalin 75 milliGRAM(s) Oral three times a day  sodium chloride 0.9% lock flush 3 milliLiter(s) IV Push every 8 hours    MEDICATIONS  (PRN):  acetaminophen   Tablet. 650 milliGRAM(s) Oral every 6 hours PRN T > 100.4 or pain  benzocaine 15 mG/menthol 3.6 mG Lozenge 1 Lozenge Oral two times a day PRN Sore Throat  dextrose 40% Gel 15 Gram(s) Oral once PRN Blood Glucose LESS THAN 70 milliGRAM(s)/deciliter  glucagon  Injectable 1 milliGRAM(s) IntraMuscular once PRN Glucose LESS THAN 70 milligrams/deciliter  ondansetron    Tablet 4 milliGRAM(s) Oral every 6 hours PRN Nausea and/or Vomiting  ondansetron Injectable 4 milliGRAM(s) IV Push every 6 hours PRN Nausea      Social History: no active tobacco / EtOH / IVDA    Family History: No pertinent family history in first degree relatives      ROS: As noted above, otherwise unremarkable.    Vital Signs Last 24 Hrs  T(C): 36.6 (20 Aug 2018 15:07), Max: 36.6 (20 Aug 2018 15:07)  T(F): 97.9 (20 Aug 2018 15:07), Max: 97.9 (20 Aug 2018 15:07)  HR: 64 (20 Aug 2018 15:19) (63 - 92)  BP: 96/48 (20 Aug 2018 15:07) (92/50 - 126/59)  BP(mean): --  RR: 18 (20 Aug 2018 15:07) (18 - 18)  SpO2: 98% (20 Aug 2018 15:19) (93% - 98%)    I&O's Detail    I&O's Summary    PHYSICAL EXAM:  General: Appears well developed, well nourished, no acute distress  HEENT: Head: normocephalic, atraumatic  Eyes: Pupils equal and reactive  Neck: Supple, no carotid bruit, no JVD, no HJR  CARDIOVASCULAR: Normal S1 and S2, metallic valve sound.  LUNGS: Clear to auscultation bilaterally, no rales, rhonchi or wheeze  ABDOMEN: Soft, nontender, non-distended, positive bowel sounds, no mass or bruit  EXTREMITIES: No edema, distal pulses WNL  SKIN: Warm and dry with normal turgor  NEURO: Alert & oriented x 3, grossly intact  PSYCH: normal mood and affect    LABS:                        8.5    7.6   )-----------( 131      ( 20 Aug 2018 05:57 )             25.5     08-20    138  |  98  |  22.0<H>  ----------------------------<  174<H>  3.6   |  28.0  |  1.13    Ca    8.2<L>      20 Aug 2018 15:22  Mg     1.5     08-20      PT/INR - ( 20 Aug 2018 05:57 )   PT: 46.5 sec;   INR: 4.11 ratio      RADIOLOGY & ADDITIONAL STUDIES:    ECG: V paced rhythm @70/min; occasional PVC's    ECHO (06/2017): mild concentric LVH; normal LV systolic function E 55-60%; mildly dilated LA, mechanical MVR; mildly elevated PASP.     ECHO (08/2018):  Summary:   1. Technically difficult study.   2. Hyperdynamic global left ventricular systolic function. Left ventricular ejection fraction, by visual estimation, is >75%.   3. Moderately increased LV wall thickness.   4. Normal RV size and systolic function.   5. A mechanical valve is present in the mitral position. Normal function.   6. Mild aortic regurgitation.   7. Moderate tricuspid regurgitation.   8. Mild pulmonic insufficiency.   9. Dilated aorta and pulmonary artery.  10. Estimated pulmonary artery systolic pressure is 42.9 mmHg assuming a right atrial pressure of 3 mmHg, which is consistent with mild pulmonary hypertension.  11. There is no evidence of pericardial effusion.  12. ** No prior echocardiograms available for comparison.      NUCLEAR STRESS TEST (09/2016): normal rest-stress myocardial perfusion; normal LV function EF 76%.     Assessment and Plan:  In summary, DAVID LUIS is a 83y man with significant history of HTN, DMII, HLD, CAD s/p remote CABG, mechanical MVR (2012), SSS s/p PPM (2009), moderate b/l carotid disease, and recent acute blood loss anemia with recent admission (GIB), sent to the ER for evaluation for anemia.     - Monitor on telemetry  - The patient has been chest pain free since admission.  Mildly elevated flat-trending troponin is nonspecific and moreover unchanged from recent admission; doubt acute MI.  Continue medical management fo known severe CAD.   - Echocardiogram from 08/20/2018 reviewed as above.   - No evidence of ischemia or CHF clinically, eventual ischemic evaluation (likely as outpatient).  The patient received a dose of lasix earlier with significant clinical and symptomatic improvement.  Will hold off on additional lasix for now.  If additional transfusion required, would plan to dose lasix 40 mg post transfusion.    - Rhythm/hemodynamics stable = continue current doses for now and titrate PRN  - Acute anemia (?blood loss) with recent GIB The patient is s/p mechanical mitral valve replacement, maintained on coumadin with goal INR of 2.5-3.5.  This should be continued.  GI following regarding BRBPR. Patient initially refused colonoscopy but now appears willing to proceed.  There is no cardiac contraindication to proceeding with colonoscopy which may be performed as scheduled.   - The patient is s/p 2 units pRBC with appropriate response; continue management per medicine.   - Despite coumadin being held since admission INR appears to be rising.  Continue to hold for now. Will readdress pending results of colonoscopy and/or when INR drifts below 2.5.  If not contraindicated, would start aspirin 81 mg daily.     We will follow with you.  Thank you for allowing me to participate in the care of your patient.      Sincerely,    Olegario Alvarado M.D.

## 2018-08-22 NOTE — CONSULT NOTE ADULT - ASSESSMENT
-Hypoxic resp failure; see below  -Pulm edema on cxr, edema, crackles, elevated BNP after 2U PRBC; likely diastolic dysfxn with fluid overload  -Hx smoking; possible underlying COPD but that does not seem to be playing major role though could be contributing  -GI bleed  -On coumadin; unlikely PE  -Anemia    RECC:  Diurese further. Repeat CXR am. O2. Nebs prn. Follow H/H. GI f/u. Hold on colonoscopy until further stabilized from respiratory standpoint. Cardiology f/u.     D/W Dr Jimenez. -Hypoxic resp failure; see below  -Pulm edema on cxr, edema, crackles, elevated BNP after 2U PRBC; likely diastolic dysfxn with fluid overload  -Hx smoking; possible underlying COPD but that does not seem to be playing major role though could be contributing  -GI bleed  -Hx TREV.  -On coumadin; unlikely PE  -Anemia    RECC:  Diurese further. Repeat CXR am. O2. Nebs prn. Follow H/H. GI f/u. Empiric CPAP. Hold on colonoscopy until further stabilized from respiratory standpoint. Cardiology f/u.     D/W Dr Jimenez.

## 2018-08-22 NOTE — PROGRESS NOTE ADULT - ASSESSMENT
82 y/o M with PMH of CAD s/p CABG, DM II, s/p MVR on coumadin 3.5mg daily, and s/p PPM who presented to [facility] ED from [facility] with drop in H/H. No noted bleeding noted. Patient was recently admitted with GI bleed, no GI intervention done at that time. Patient was discharged to Rehab on 8/3/18. Per his Wife patient did not participate in rehab much. In the ER patient was hemodynamically stable. Hemoglobin 6.8, INR of 3.2. Patient was given IV fluids and admitted for anemia.  Transfused 2 units of PRBC.  FOBT negative. Noted to have hypoxia with respiratory distress. Chest xray consistent wiht bilateral effusions. Echocardiogram wiht diastolic dysfunction; normal LVSF. Improved with dose of IV lasix. Cardiology consulted for recommendations. Spoke with GI, pending colonoscopy    Assessment/Plan:    1. Acute on chronic microcytic anemia: FOBT negative; no s/s of active bleeding at this time  Hb/hbct stable after transfusion  Pending colonscopy.  Pre-procedure antibiotics ordered.   Pulmonary consulted for optimization prior to colonoscopy.     2. MARIBEL: Likely azotemia from anemia  Resolved    3. Mechanical mitral valve:  goal of INR at 2.5 to 3. INR now supra therapeutic Coumadin on hold. No s/s of active bleeding.   Repeat INR in AM.     4. CAD Asymptomatic  not on ASA or plavix  continue metoprolol and statin    5. Diabetes mellitus type 2; HSS. Monitor bsl 84 y/o M with PMH of CAD s/p CABG, DM II, s/p MVR on coumadin 3.5mg daily, and s/p PPM who presented to [facility] ED from [facility] with drop in H/H. No noted bleeding noted. Patient was recently admitted with GI bleed, no GI intervention done at that time. Patient was discharged to Rehab on 8/3/18. Per his Wife patient did not participate in rehab much. In the ER patient was hemodynamically stable. Hemoglobin 6.8, INR of 3.2. Patient was given IV fluids and admitted for anemia.  Transfused 2 units of PRBC.  FOBT negative. Noted to have hypoxia with respiratory distress. Chest xray consistent wiht bilateral effusions. Echocardiogram wiht diastolic dysfunction; normal LVSF. Improved with dose of IV lasix. Cardiology consulted for recommendations. Spoke with GI, pending colonoscopy    Assessment/Plan:    1. Acute on chronic microcytic anemia: FOBT negative; no s/s of active bleeding at this time  Hb/hbct stable after transfusion  Pending colonscopy.  Pre-procedure antibiotics ordered.   Pulmonary consulted for optimization prior to colonoscopy.     2. MARIBEL: Likely azotemia from anemia  Resolved    3. Mechanical mitral valve:  goal of INR at 2.5 to 3. INR now supra therapeutic Coumadin on hold. No s/s of active bleeding.   Repeat INR in AM.     4. CAD Asymptomatic  not on ASA or plavix  continue metoprolol and statin    5. Acute on chronic diastolic CHF: echocardiogram reviewed; BNP elevated, hypoxic on room air with bilateral pleural effusions Pulmonary consult. IV lasix x 40mg     6. Diabetes mellitus type 2; HSS. Monitor bsl 84 y/o M with PMH of CAD s/p CABG, DM II, s/p MVR on coumadin 3.5mg daily, and s/p PPM who presented to [facility] ED from [facility] with drop in H/H. No noted bleeding noted. Patient was recently admitted with GI bleed, no GI intervention done at that time. Patient was discharged to Rehab on 8/3/18. Per his Wife patient did not participate in rehab much. In the ER patient was hemodynamically stable. Hemoglobin 6.8, INR of 3.2. Patient was given IV fluids and admitted for anemia.  Transfused 2 units of PRBC.  FOBT negative. Noted to have hypoxia with respiratory distress. Chest xray consistent wiht bilateral effusions. Echocardiogram wiht diastolic dysfunction; normal LVSF. Improved with dose of IV lasix. Cardiology consulted for recommendations. Spoke with GI, pending colonoscopy    Assessment/Plan:    1. Acute on chronic microcytic anemia: FOBT negative; no s/s of active bleeding at this time  Hb/hbct stable after transfusion  Pending colonscopy.  Pre-procedure antibiotics ordered.   Pulmonary consulted for optimization prior to colonoscopy.     2. MARIBEL: Likely azotemia from anemia  Resolved    3. Mechanical mitral valve:  goal of INR at 2.5 to 3. INR now supra therapeutic Coumadin on hold. No s/s of active bleeding.   Repeat INR in AM.     4. CAD Asymptomatic  not on ASA or plavix  continue metoprolol and statin    5. Acute on chronic diastolic CHF: echocardiogram reviewed; BNP elevated, hypoxic on room air with bilateral pleural effusions Pulmonary consult. IV lasix x 40mg     6. Diabetes mellitus type 2; HSS. Monitor bsl    Colonoscopy held until stable. Spoke with pulmonary medicine and GI. Plan for virtual colonscopy in Am. Called wife and updated on plan of care.

## 2018-08-23 LAB
ANION GAP SERPL CALC-SCNC: 12 MMOL/L — SIGNIFICANT CHANGE UP (ref 5–17)
BUN SERPL-MCNC: 20 MG/DL — SIGNIFICANT CHANGE UP (ref 8–20)
CALCIUM SERPL-MCNC: 7.9 MG/DL — LOW (ref 8.6–10.2)
CHLORIDE SERPL-SCNC: 98 MMOL/L — SIGNIFICANT CHANGE UP (ref 98–107)
CO2 SERPL-SCNC: 31 MMOL/L — HIGH (ref 22–29)
CREAT SERPL-MCNC: 1.19 MG/DL — SIGNIFICANT CHANGE UP (ref 0.5–1.3)
GLUCOSE BLDC GLUCOMTR-MCNC: 123 MG/DL — HIGH (ref 70–99)
GLUCOSE BLDC GLUCOMTR-MCNC: 127 MG/DL — HIGH (ref 70–99)
GLUCOSE BLDC GLUCOMTR-MCNC: 130 MG/DL — HIGH (ref 70–99)
GLUCOSE BLDC GLUCOMTR-MCNC: 140 MG/DL — HIGH (ref 70–99)
GLUCOSE SERPL-MCNC: 112 MG/DL — SIGNIFICANT CHANGE UP (ref 70–115)
HCT VFR BLD CALC: 26.8 % — LOW (ref 42–52)
HGB BLD-MCNC: 8.5 G/DL — LOW (ref 14–18)
INR BLD: 2.54 RATIO — HIGH (ref 0.88–1.16)
MCHC RBC-ENTMCNC: 29.9 PG — SIGNIFICANT CHANGE UP (ref 27–31)
MCHC RBC-ENTMCNC: 31.7 G/DL — LOW (ref 32–36)
MCV RBC AUTO: 94.4 FL — HIGH (ref 80–94)
PLATELET # BLD AUTO: 164 K/UL — SIGNIFICANT CHANGE UP (ref 150–400)
POTASSIUM SERPL-MCNC: 3.9 MMOL/L — SIGNIFICANT CHANGE UP (ref 3.5–5.3)
POTASSIUM SERPL-SCNC: 3.9 MMOL/L — SIGNIFICANT CHANGE UP (ref 3.5–5.3)
PROTHROM AB SERPL-ACNC: 28.5 SEC — HIGH (ref 9.8–12.7)
RBC # BLD: 2.84 M/UL — LOW (ref 4.6–6.2)
RBC # FLD: 21 % — HIGH (ref 11–15.6)
SODIUM SERPL-SCNC: 141 MMOL/L — SIGNIFICANT CHANGE UP (ref 135–145)
WBC # BLD: 6.7 K/UL — SIGNIFICANT CHANGE UP (ref 4.8–10.8)
WBC # FLD AUTO: 6.7 K/UL — SIGNIFICANT CHANGE UP (ref 4.8–10.8)

## 2018-08-23 PROCEDURE — 99233 SBSQ HOSP IP/OBS HIGH 50: CPT

## 2018-08-23 PROCEDURE — 71046 X-RAY EXAM CHEST 2 VIEWS: CPT | Mod: 26

## 2018-08-23 RX ORDER — MULTIVIT WITH MIN/MFOLATE/K2 340-15/3 G
296 POWDER (GRAM) ORAL
Qty: 0 | Refills: 0 | Status: COMPLETED | OUTPATIENT
Start: 2018-08-23 | End: 2018-08-23

## 2018-08-23 RX ORDER — FUROSEMIDE 40 MG
40 TABLET ORAL DAILY
Qty: 0 | Refills: 0 | Status: DISCONTINUED | OUTPATIENT
Start: 2018-08-23 | End: 2018-08-24

## 2018-08-23 RX ADMIN — PANTOPRAZOLE SODIUM 40 MILLIGRAM(S): 20 TABLET, DELAYED RELEASE ORAL at 12:36

## 2018-08-23 RX ADMIN — SODIUM CHLORIDE 3 MILLILITER(S): 9 INJECTION INTRAMUSCULAR; INTRAVENOUS; SUBCUTANEOUS at 22:18

## 2018-08-23 RX ADMIN — Medication 100 MILLIGRAM(S): at 12:36

## 2018-08-23 RX ADMIN — ATORVASTATIN CALCIUM 40 MILLIGRAM(S): 80 TABLET, FILM COATED ORAL at 22:36

## 2018-08-23 RX ADMIN — Medication 296 MILLILITER(S): at 16:00

## 2018-08-23 RX ADMIN — Medication 100 MILLIGRAM(S): at 22:36

## 2018-08-23 RX ADMIN — Medication 150 MILLIGRAM(S): at 12:36

## 2018-08-23 RX ADMIN — SODIUM CHLORIDE 3 MILLILITER(S): 9 INJECTION INTRAMUSCULAR; INTRAVENOUS; SUBCUTANEOUS at 12:35

## 2018-08-23 RX ADMIN — Medication 40 MILLIGRAM(S): at 05:22

## 2018-08-23 RX ADMIN — Medication 75 MILLIGRAM(S): at 22:36

## 2018-08-23 RX ADMIN — Medication 75 MILLIGRAM(S): at 12:36

## 2018-08-23 RX ADMIN — NYSTATIN CREAM 1 APPLICATION(S): 100000 CREAM TOPICAL at 18:05

## 2018-08-23 RX ADMIN — MIRTAZAPINE 7.5 MILLIGRAM(S): 45 TABLET, ORALLY DISINTEGRATING ORAL at 22:36

## 2018-08-23 RX ADMIN — Medication 296 MILLILITER(S): at 15:00

## 2018-08-23 RX ADMIN — NYSTATIN CREAM 1 APPLICATION(S): 100000 CREAM TOPICAL at 05:22

## 2018-08-23 RX ADMIN — SODIUM CHLORIDE 3 MILLILITER(S): 9 INJECTION INTRAMUSCULAR; INTRAVENOUS; SUBCUTANEOUS at 05:14

## 2018-08-23 NOTE — DIETITIAN INITIAL EVALUATION ADULT. - NS AS NUTRI INTERV MEDICAL AND FOOD SUPPLEMENTS
Diabetishiching provided automatically with meals on [facility] clears diet to provide an additional 150 kcal, 7g protein per serving

## 2018-08-23 NOTE — DIETITIAN INITIAL EVALUATION ADULT. - PT NOT SOURCE
pt noted with a hx of dementia, upon interview did not seem interested or provide much information overall

## 2018-08-23 NOTE — PROGRESS NOTE ADULT - SUBJECTIVE AND OBJECTIVE BOX
CC: Hypoxia    INTERVAL HPI/OVERNIGHT EVENTS: Patient seen and examined, desats to the 80s on room air currently on nasal cannula. No acute events overnight. Denies sob, chest pain or palpitaitons. Denies abdominal pain nausea or vomiting.       Vital Signs Last 24 Hrs  T(C): 36.4 (23 Aug 2018 05:16), Max: 36.7 (22 Aug 2018 11:35)  T(F): 97.6 (23 Aug 2018 05:16), Max: 98 (22 Aug 2018 11:35)  HR: 61 (23 Aug 2018 05:16) (60 - 71)  BP: 125/48 (23 Aug 2018 05:16) (114/55 - 125/48)  BP(mean): --  RR: 18 (23 Aug 2018 05:16) (18 - 18)  SpO2: 96% (23 Aug 2018 05:16) (95% - 98%)    PHYSICAL EXAM:    GENERAL: NAD, AOX2  HEAD:  Atraumatic, Normocephalic  EYES: EOMI, PERRLA, conjunctiva and sclera clear  ENMT: Moist mucous membranes  NECK: Supple, No JVD  CHEST/LUNG: Bibasilar crackles   HEART: Regular rate and rhythm; No murmurs, rubs, or gallops  ABDOMEN: Soft, Nontender, Nondistended; Bowel sounds present  EXTREMITIES:  2+ Peripheral Pulses, No clubbing, cyanosis, or edema        MEDICATIONS  (STANDING):  ALBUTerol/ipratropium for Nebulization 3 milliLiter(s) Nebulizer every 6 hours  allopurinol 150 milliGRAM(s) Oral daily  ampicillin  IVPB 2 Gram(s) IV Intermittent once  atorvastatin 40 milliGRAM(s) Oral at bedtime  bisacodyl 5 milliGRAM(s) Oral at bedtime  dextrose 5%. 1000 milliLiter(s) (50 mL/Hr) IV Continuous <Continuous>  dextrose 50% Injectable 12.5 Gram(s) IV Push once  dextrose 50% Injectable 25 Gram(s) IV Push once  dextrose 50% Injectable 25 Gram(s) IV Push once  docusate sodium 100 milliGRAM(s) Oral three times a day  furosemide    Tablet 40 milliGRAM(s) Oral daily  gentamicin   IVPB 80 milliGRAM(s) IV Intermittent once  insulin lispro (HumaLOG) corrective regimen sliding scale   SubCutaneous three times a day before meals  mirtazapine 7.5 milliGRAM(s) Oral at bedtime  nystatin Powder 1 Application(s) Topical two times a day  pantoprazole  Injectable 40 milliGRAM(s) IV Push daily  pregabalin 75 milliGRAM(s) Oral three times a day  sodium chloride 0.9% lock flush 3 milliLiter(s) IV Push every 8 hours    MEDICATIONS  (PRN):  acetaminophen   Tablet. 650 milliGRAM(s) Oral every 6 hours PRN T > 100.4 or pain  benzocaine 15 mG/menthol 3.6 mG Lozenge 1 Lozenge Oral two times a day PRN Sore Throat  dextrose 40% Gel 15 Gram(s) Oral once PRN Blood Glucose LESS THAN 70 milliGRAM(s)/deciliter  glucagon  Injectable 1 milliGRAM(s) IntraMuscular once PRN Glucose LESS THAN 70 milligrams/deciliter  ondansetron Injectable 4 milliGRAM(s) IV Push every 6 hours PRN Nausea      Allergies    No Known Allergies    Intolerances          LABS:                          8.5    6.7   )-----------( 164      ( 23 Aug 2018 07:29 )             26.8     08-23    141  |  98  |  20.0  ----------------------------<  112  3.9   |  31.0<H>  |  1.19    Ca    7.9<L>      23 Aug 2018 07:29  Mg     2.0     08-22      PT/INR - ( 23 Aug 2018 07:29 )   PT: 28.5 sec;   INR: 2.54 ratio               RADIOLOGY & ADDITIONAL TESTS:

## 2018-08-23 NOTE — PROGRESS NOTE ADULT - ASSESSMENT
82 y/o M with PMH of CAD s/p CABG, DM II, s/p MVR on coumadin 3.5mg daily, and s/p PPM who presented to [facility] ED from [facility] with drop in H/H. No noted bleeding noted. Patient was recently admitted with GI bleed, no GI intervention done at that time. Patient was discharged to Rehab on 8/3/18. Per his Wife patient did not participate in rehab much. In the ER patient was hemodynamically stable. Hemoglobin 6.8, INR of 3.2. Patient was given IV fluids and admitted for anemia.  Transfused 2 units of PRBC.  FOBT negative. Noted to have hypoxia with respiratory distress. Chest xray consistent wiht bilateral effusions. Echocardiogram wiht diastolic dysfunction; normal LVSF. Improved with dose of IV lasix. Cardiology consulted for recommendations. Colonscopy was postponed for acute on chronic diastolic CHF requiring IV lasix. Pulmonary medicine consulted for recommendations.     Assessment/Plan:    1. Acute on chronic microcytic anemia: FOBT negative; no s/s of active bleeding at this time  Hb/hbct stable after transfusion  Colonscopy was held yesterday for acute on chronic diastolic CHF requring IV lasix.     SPoke with Dr Bai yesterday, he was npo for virtual colonoscopy however prep was not ordered.  WIll defer to GI.     2. MARIBEL: Likely azotemia from anemia  Resolved    3. Mechanical mitral valve:  goal of INR at 2.5 to 3. INR now supra therapeutic Coumadin on hold. No s/s of active bleeding.   Repeat INR in AM.     4. CAD Asymptomatic  not on ASA or plavix  continue metoprolol and statin    5. Acute hypoxic respiratory failure secondary to Acute on chronic diastolic CHF: echocardiogram reviewed; BNP elevated, hypoxic on room air with bilateral pleural effusions Pulmonary consulted. IV lasix x 40mg changed to PO by cardiology. FOllow up chest xray this morning.     6. Diabetes mellitus type 2; HSS. Monitor bsl 84 y/o M with PMH of CAD s/p CABG, DM II, s/p MVR on coumadin 3.5mg daily, and s/p PPM who presented to [facility] ED from [facility] with drop in H/H. No noted bleeding noted. Patient was recently admitted with GI bleed, no GI intervention done at that time. Patient was discharged to Rehab on 8/3/18. Per his Wife patient did not participate in rehab much. In the ER patient was hemodynamically stable. Hemoglobin 6.8, INR of 3.2. Patient was given IV fluids and admitted for anemia.  Transfused 2 units of PRBC.  FOBT negative. Noted to have hypoxia with respiratory distress. Chest xray consistent wiht bilateral effusions. Echocardiogram wiht diastolic dysfunction; normal LVSF. Improved with dose of IV lasix. Cardiology consulted for recommendations. Colonscopy was postponed for acute on chronic diastolic CHF requiring IV lasix. Pulmonary medicine consulted for recommendations.     Assessment/Plan:    1. Acute on chronic microcytic anemia: FOBT negative; no s/s of active bleeding at this time  Hb/hbct stable after transfusion  Colonscopy was held yesterday for acute on chronic diastolic CHF requring IV lasix.     SPoke with Dr Bai yesterday, he was npo for virtual colonoscopy however prep was not ordered.  WIll defer to GI.     2. MARIBEL: Likely azotemia from anemia  Resolved    3. Mechanical mitral valve:  goal of INR at 2.5 to 3. INR now supra therapeutic Coumadin on hold. No s/s of active bleeding.   Repeat INR in AM.     4. CAD Asymptomatic  not on ASA or plavix  continue metoprolol and statin    5. Acute hypoxic respiratory failure secondary to Acute on chronic diastolic CHF: echocardiogram reviewed; BNP elevated, hypoxic on room air with bilateral pleural effusions Pulmonary consulted. IV lasix x 40mg changed to PO by cardiology. FOllow up chest xray this morning.     6. Diabetes mellitus type 2; HSS. Monitor bsl    Spoke with patient's wife and updated on care plan

## 2018-08-23 NOTE — PROGRESS NOTE ADULT - SUBJECTIVE AND OBJECTIVE BOX
Patient is scheduled for virtual colonoscopy tomorrow.  Appointment confirmed with radiology and reviewed recommendations with MD Li and MD Clifton.  Orders as directed in computer    Clear liquid diet  Magnesium citrate 1 bottle between 3-4 pm, ordered stat  Magnesium citrate 1 bottle between 4-5pm also ordered  6-8p, 250 ML redicat 1/2 bottle from radiology provider to rn to administer tonight.    9-11pm gastrograffin (2 bottles-may mix iwth 16oz of clear liquid.   NPO past midnight tonight  dulcolax suppository in am  encourage bm before virtual colonoscopy.   All orders in computer and discussed with RN, MD Kearns and MD Clifton

## 2018-08-23 NOTE — PROGRESS NOTE ADULT - SUBJECTIVE AND OBJECTIVE BOX
PULMONARY PROGRESS NOTE      [facility]-[id number]    Patient is a 83y old  Male who presents with a chief complaint of Anemia (17 Aug 2018 20:50)      INTERVAL HPI/OVERNIGHT EVENTS: Says his breathing is good. Some cough; no cp. Still desats on RA. On lasix.     MEDICATIONS  (STANDING):  ALBUTerol/ipratropium for Nebulization 3 milliLiter(s) Nebulizer every 6 hours  allopurinol 150 milliGRAM(s) Oral daily  ampicillin  IVPB 2 Gram(s) IV Intermittent once  atorvastatin 40 milliGRAM(s) Oral at bedtime  bisacodyl 5 milliGRAM(s) Oral at bedtime  dextrose 5%. 1000 milliLiter(s) (50 mL/Hr) IV Continuous <Continuous>  dextrose 50% Injectable 12.5 Gram(s) IV Push once  dextrose 50% Injectable 25 Gram(s) IV Push once  dextrose 50% Injectable 25 Gram(s) IV Push once  docusate sodium 100 milliGRAM(s) Oral three times a day  furosemide    Tablet 40 milliGRAM(s) Oral daily  gentamicin   IVPB 80 milliGRAM(s) IV Intermittent once  insulin lispro (HumaLOG) corrective regimen sliding scale   SubCutaneous three times a day before meals  mirtazapine 7.5 milliGRAM(s) Oral at bedtime  nystatin Powder 1 Application(s) Topical two times a day  pantoprazole  Injectable 40 milliGRAM(s) IV Push daily  pregabalin 75 milliGRAM(s) Oral three times a day  sodium chloride 0.9% lock flush 3 milliLiter(s) IV Push every 8 hours      MEDICATIONS  (PRN):  acetaminophen   Tablet. 650 milliGRAM(s) Oral every 6 hours PRN T > 100.4 or pain  benzocaine 15 mG/menthol 3.6 mG Lozenge 1 Lozenge Oral two times a day PRN Sore Throat  dextrose 40% Gel 15 Gram(s) Oral once PRN Blood Glucose LESS THAN 70 milliGRAM(s)/deciliter  glucagon  Injectable 1 milliGRAM(s) IntraMuscular once PRN Glucose LESS THAN 70 milligrams/deciliter  ondansetron Injectable 4 milliGRAM(s) IV Push every 6 hours PRN Nausea      Allergies    No Known Allergies    Intolerances        PAST MEDICAL & SURGICAL HISTORY:  Chronic pain  Insomnia  Diabetes  Mitral valve replaced  Pacemaker  S/P CABG (coronary artery bypass graft)      SOCIAL HISTORY  Smoking History: quit 20-30 y/a      REVIEW OF SYSTEMS:    CONSTITUTIONAL:  No distress    HEENT:  Eyes:  No diplopia or blurred vision. ENT:  No earache, sore throat or runny nose.    CARDIOVASCULAR:  per HPI    RESPIRATORY:  per HPI     GASTROINTESTINAL:  No nausea, vomiting or diarrhea.    GENITOURINARY:  No dysuria, frequency or urgency.    NEUROLOGIC:  No paresthesias, fasciculations, seizures or weakness.    PSYCHIATRIC:  No disorder of thought or mood.    Vital Signs Last 24 Hrs  T(C): 36.4 (23 Aug 2018 05:16), Max: 36.7 (22 Aug 2018 11:35)  T(F): 97.6 (23 Aug 2018 05:16), Max: 98 (22 Aug 2018 11:35)  HR: 61 (23 Aug 2018 05:16) (60 - 71)  BP: 125/48 (23 Aug 2018 05:16) (114/55 - 125/48)  BP(mean): --  RR: 18 (23 Aug 2018 05:16) (18 - 18)  SpO2: 96% (23 Aug 2018 05:16) (95% - 98%)    PHYSICAL EXAMINATION:    GENERAL: The patient is awake and alert in no apparent distress.     HEENT: Head is normocephalic and atraumatic.  Mucous membranes are moist.    NECK: Supple.    LUNGS: crackles at bases, respirations unlabored    HEART: Regular rate and rhythm      ABDOMEN: Soft, nontender, and nondistended.      EXTREMITIES: Without any cyanosis, clubbing, rash, lesions or edema.    NEUROLOGIC: Grossly intact.    LABS:                        8.5    6.7   )-----------( 164      ( 23 Aug 2018 07:29 )             26.8     08-23    141  |  98  |  20.0  ----------------------------<  112  3.9   |  31.0<H>  |  1.19    Ca    7.9<L>      23 Aug 2018 07:29  Mg     2.0     08-22      PT/INR - ( 23 Aug 2018 07:29 )   PT: 28.5 sec;   INR: 2.54 ratio            Serum Pro-Brain Natriuretic Peptide: 4433 pg/mL (08-21-18 @ 06:55)         RADIOLOGY & ADDITIONAL STUDIES:  cxr pending

## 2018-08-23 NOTE — PROGRESS NOTE ADULT - ASSESSMENT
-Hypoxic resp failure; see below  -Pulm edema on cxr, edema, crackles, elevated BNP after 2U PRBC; likely diastolic dysfxn with fluid overload; now on lasix  -Hx smoking; possible underlying COPD but that does not seem to be playing major role though could be contributing  -GI bleed  -Hx TREV.  -On coumadin; unlikely PE  -Anemia    RECC:  Diurese further. Repeat CXR am. O2. Nebs ordered ATC. Follow H/H. GI f/u. Empiric CPAP.  Cardiology f/u.

## 2018-08-23 NOTE — DIETITIAN INITIAL EVALUATION ADULT. - MD RECOMMEND
- Advance diet as medically feasible to consistent carbohydrate, DASH. Will monitor po intake and need for oral nutritional supplement with diet advancement. Encourage po intake at meals. Recommend MVI daily.

## 2018-08-23 NOTE — DIETITIAN INITIAL EVALUATION ADULT. - OTHER INFO
PMH of CAD s/p CABG, DM II, s/p MVR on coumadin 3.5mg daily, and s/p PPM who presented to [facility] ED from [facility] with drop in H/H. BMI 26.4. Upon interview, pt consuming lunch (clears), states he is tolerating. Pt did not seem interested in interview or want to provide nutrition hx. Per paper chart review, pt admitted from [facility] and was receiving a no concentrated sweets, low fiber diet (regular/thin liquids). Also noted to be receiving po Glucerna 118 ml 3x/day with med pass and on a 1584 ml/day fluid restriction.

## 2018-08-24 LAB
ANION GAP SERPL CALC-SCNC: 10 MMOL/L — SIGNIFICANT CHANGE UP (ref 5–17)
BUN SERPL-MCNC: 20 MG/DL — SIGNIFICANT CHANGE UP (ref 8–20)
CALCIUM SERPL-MCNC: 8.2 MG/DL — LOW (ref 8.6–10.2)
CHLORIDE SERPL-SCNC: 99 MMOL/L — SIGNIFICANT CHANGE UP (ref 98–107)
CO2 SERPL-SCNC: 35 MMOL/L — HIGH (ref 22–29)
CREAT SERPL-MCNC: 1.17 MG/DL — SIGNIFICANT CHANGE UP (ref 0.5–1.3)
GLUCOSE BLDC GLUCOMTR-MCNC: 122 MG/DL — HIGH (ref 70–99)
GLUCOSE BLDC GLUCOMTR-MCNC: 130 MG/DL — HIGH (ref 70–99)
GLUCOSE BLDC GLUCOMTR-MCNC: 160 MG/DL — HIGH (ref 70–99)
GLUCOSE SERPL-MCNC: 109 MG/DL — SIGNIFICANT CHANGE UP (ref 70–115)
HCT VFR BLD CALC: 26.8 % — LOW (ref 42–52)
HGB BLD-MCNC: 8.6 G/DL — LOW (ref 14–18)
INR BLD: 2.5 RATIO — HIGH (ref 0.88–1.16)
MCHC RBC-ENTMCNC: 29.8 PG — SIGNIFICANT CHANGE UP (ref 27–31)
MCHC RBC-ENTMCNC: 32.1 G/DL — SIGNIFICANT CHANGE UP (ref 32–36)
MCV RBC AUTO: 92.7 FL — SIGNIFICANT CHANGE UP (ref 80–94)
MRSA PCR RESULT.: ABNORMAL
PLATELET # BLD AUTO: 165 K/UL — SIGNIFICANT CHANGE UP (ref 150–400)
POTASSIUM SERPL-MCNC: 3.5 MMOL/L — SIGNIFICANT CHANGE UP (ref 3.5–5.3)
POTASSIUM SERPL-SCNC: 3.5 MMOL/L — SIGNIFICANT CHANGE UP (ref 3.5–5.3)
PROTHROM AB SERPL-ACNC: 28 SEC — HIGH (ref 9.8–12.7)
RBC # BLD: 2.89 M/UL — LOW (ref 4.6–6.2)
RBC # FLD: 20.4 % — HIGH (ref 11–15.6)
S AUREUS DNA NOSE QL NAA+PROBE: SIGNIFICANT CHANGE UP
SODIUM SERPL-SCNC: 144 MMOL/L — SIGNIFICANT CHANGE UP (ref 135–145)
WBC # BLD: 5.6 K/UL — SIGNIFICANT CHANGE UP (ref 4.8–10.8)
WBC # FLD AUTO: 5.6 K/UL — SIGNIFICANT CHANGE UP (ref 4.8–10.8)

## 2018-08-24 PROCEDURE — 99233 SBSQ HOSP IP/OBS HIGH 50: CPT

## 2018-08-24 PROCEDURE — 74261 CT COLONOGRAPHY DX: CPT | Mod: 26

## 2018-08-24 RX ORDER — FUROSEMIDE 40 MG
40 TABLET ORAL DAILY
Qty: 0 | Refills: 0 | Status: DISCONTINUED | OUTPATIENT
Start: 2018-08-24 | End: 2018-08-25

## 2018-08-24 RX ORDER — WARFARIN SODIUM 2.5 MG/1
2 TABLET ORAL ONCE
Qty: 0 | Refills: 0 | Status: COMPLETED | OUTPATIENT
Start: 2018-08-24 | End: 2018-08-24

## 2018-08-24 RX ADMIN — MIRTAZAPINE 7.5 MILLIGRAM(S): 45 TABLET, ORALLY DISINTEGRATING ORAL at 21:10

## 2018-08-24 RX ADMIN — Medication 3 MILLILITER(S): at 09:03

## 2018-08-24 RX ADMIN — SODIUM CHLORIDE 3 MILLILITER(S): 9 INJECTION INTRAMUSCULAR; INTRAVENOUS; SUBCUTANEOUS at 21:13

## 2018-08-24 RX ADMIN — Medication 100 MILLIGRAM(S): at 21:10

## 2018-08-24 RX ADMIN — Medication 75 MILLIGRAM(S): at 12:55

## 2018-08-24 RX ADMIN — ATORVASTATIN CALCIUM 40 MILLIGRAM(S): 80 TABLET, FILM COATED ORAL at 21:09

## 2018-08-24 RX ADMIN — Medication 3 MILLILITER(S): at 21:16

## 2018-08-24 RX ADMIN — SODIUM CHLORIDE 3 MILLILITER(S): 9 INJECTION INTRAMUSCULAR; INTRAVENOUS; SUBCUTANEOUS at 05:38

## 2018-08-24 RX ADMIN — SODIUM CHLORIDE 3 MILLILITER(S): 9 INJECTION INTRAMUSCULAR; INTRAVENOUS; SUBCUTANEOUS at 12:55

## 2018-08-24 RX ADMIN — Medication 2: at 12:55

## 2018-08-24 RX ADMIN — NYSTATIN CREAM 1 APPLICATION(S): 100000 CREAM TOPICAL at 05:39

## 2018-08-24 RX ADMIN — NYSTATIN CREAM 1 APPLICATION(S): 100000 CREAM TOPICAL at 17:55

## 2018-08-24 RX ADMIN — WARFARIN SODIUM 2 MILLIGRAM(S): 2.5 TABLET ORAL at 21:10

## 2018-08-24 RX ADMIN — Medication 5 MILLIGRAM(S): at 05:39

## 2018-08-24 RX ADMIN — Medication 5 MILLIGRAM(S): at 21:09

## 2018-08-24 RX ADMIN — PANTOPRAZOLE SODIUM 40 MILLIGRAM(S): 20 TABLET, DELAYED RELEASE ORAL at 12:53

## 2018-08-24 RX ADMIN — Medication 75 MILLIGRAM(S): at 21:10

## 2018-08-24 RX ADMIN — Medication 150 MILLIGRAM(S): at 12:53

## 2018-08-24 RX ADMIN — Medication 40 MILLIGRAM(S): at 09:34

## 2018-08-24 NOTE — PROGRESS NOTE ADULT - ASSESSMENT
If a suspicious colonic lesion is found on CT scan, then we may need to re-consider an actual colonoscopy for next week. If not, then possible discharge over the weekend. I will discuss with the Hospitalist later today.

## 2018-08-24 NOTE — PROGRESS NOTE ADULT - SUBJECTIVE AND OBJECTIVE BOX
Patient seen and examined this morning while awaiting CT Colonography. Clinically stable. No active GI bleed. H&H is stable. INR is 2.5

## 2018-08-24 NOTE — PROGRESS NOTE ADULT - ASSESSMENT
84 y/o M with PMH of CAD s/p CABG, DM II, s/p MVR on coumadin 3.5mg daily, and s/p PPM who presented to [facility] ED from [facility] with drop in H/H. No noted bleeding noted. Patient was recently admitted with GI bleed, no GI intervention done at that time. Patient was discharged to Rehab on 8/3/18. Per his Wife patient did not participate in rehab much. In the ER patient was hemodynamically stable. Hemoglobin 6.8, INR of 3.2. Patient was given IV fluids and admitted for anemia.  Transfused 2 units of PRBC.  FOBT negative. Noted to have hypoxia with respiratory distress. Chest xray consistent wiht bilateral effusions. Echocardiogram wiht diastolic dysfunction; normal LVSF. Improved with dose of IV lasix. Cardiology consulted for recommendations. Colonscopy was postponed for acute on chronic diastolic CHF requiring IV lasix. Pulmonary medicine consulted for recommendations.     Assessment/Plan:    1. Acute on chronic microcytic anemia: FOBT negative; no s/s of active bleeding at this time  Hb/hbct stable after transfusion  Colonscopy was held yesterday for acute on chronic diastolic CHF requring IV lasix.      SPoke with Dr Bai- he was npo for virtual colonoscopy however prep was not ordered.  Prep was ordered yesterday and NPO now for virtual colonoscopy. Pending results will discuss with GI if coumadin can be restarted.     2. MARIBEL: Likely azotemia from anemia  Resolved    3. Mechanical mitral valve:  goal of INR at 2.5 to 3. INR right now at goal. WIll discuss with GI regarding restarted coumadin pending results of virtual colonoscopy. today   Repeat INR in AM.     4. CAD Asymptomatic  not on ASA or plavix given bleeding risk   continue metoprolol and statin    5. Acute hypoxic respiratory failure secondary to Acute on chronic diastolic CHF: echocardiogram reviewed; BNP elevated, hypoxic on room air with bilateral pleural effusions Pulmonary consulted.   Follow up chest xray with persistent effusions and moderate pulmonary vascular congestion- will change to iv lasix for today  Monitor daily weight and I&os    6. Diabetes mellitus type 2; HSS. Monitor bsl    PT following- [facility] on discharge  D/c telemetry, transfer to any bed with  82 y/o M with PMH of CAD s/p CABG, DM II, s/p MVR on coumadin 3.5mg daily, and s/p PPM who presented to [facility] ED from [facility] [place] with drop in H/H. No noted bleeding noted. Patient was recently admitted with GI bleed, no GI intervention done at that time. Patient was discharged to Rehab on 8/3/18. Per his Wife patient did not participate in rehab much. In the ER patient was hemodynamically stable. Hemoglobin 6.8, INR of 3.2. Patient was given IV fluids and admitted for anemia.  Transfused 2 units of PRBC.  FOBT negative. Noted to have hypoxia with respiratory distress. Chest xray consistent wiht bilateral effusions. Echocardiogram wiht diastolic dysfunction; normal LVSF. Improved with dose of IV lasix. Cardiology consulted for recommendations. Colonscopy was postponed for acute on chronic diastolic CHF requiring IV lasix. Pulmonary medicine consulted for recommendations.     Assessment/Plan:    1. Acute on chronic microcytic anemia: FOBT negative; no s/s of active bleeding at this time  Hb/hbct stable after transfusion  Colonscopy was cancelled  for acute on chronic diastolic CHF requiring IV lasix.      SPoke with Dr Bai- he was npo for virtual colonoscopy however prep was not ordered.  Prep was ordered yesterday and NPO now for virtual colonoscopy. Pending results will discuss with GI if coumadin can be restarted.     2. MARIBEL: Likely azotemia from anemia  Resolved    3. Mechanical mitral valve:  goal of INR at 2.5 to 3. INR right now at goal. WIll discuss with GI regarding restarted coumadin pending results of virtual colonoscopy. today   Repeat INR in AM.     4. CAD Asymptomatic  not on ASA or plavix given bleeding risk   continue metoprolol and statin    5. Acute hypoxic respiratory failure secondary to Acute on chronic diastolic CHF: echocardiogram reviewed; BNP elevated, hypoxic on room air with bilateral pleural effusions Pulmonary consulted.   Follow up chest xray with persistent effusions and moderate pulmonary vascular congestion- will change to iv lasix for today  Monitor daily weight and I&os    6. Diabetes mellitus type 2; HSS. Monitor bsl    PT following- [facility] on discharge  D/c telemetry, transfer to any bed with      Plan discussed with patient's wife in detail

## 2018-08-24 NOTE — PROGRESS NOTE ADULT - SUBJECTIVE AND OBJECTIVE BOX
CC: Hypoxia    INTERVAL HPI/OVERNIGHT EVENTS:  Patient seen and examined, no acute events overnight, yesterday sPo2 decreased to 82% on room air at rest improved with 5 liters of O2 via nasal cannula. At the time of my examination patient is laying comfortably in bed. He denies chest pain, sob or cough. Denies abdominal pain, nausea or vomiting> NPO for a virtual colonoscopy this morning.     Vital Signs Last 24 Hrs  T(C): 36.6 (24 Aug 2018 05:33), Max: 36.6 (24 Aug 2018 05:33)  T(F): 97.8 (24 Aug 2018 05:33), Max: 97.8 (24 Aug 2018 05:33)  HR: 68 (24 Aug 2018 09:05) (60 - 85)  BP: 104/54 (24 Aug 2018 09:37) (104/54 - 140/50)  BP(mean): --  RR: 20 (24 Aug 2018 08:29) (18 - 20)  SpO2: 99% (24 Aug 2018 09:05) (96% - 100%)    PHYSICAL EXAM:    GENERAL: NAD, AOX2  HEAD:  Atraumatic, Normocephalic  EYES: EOMI, PERRLA, conjunctiva and sclera clear  ENMT: Moist mucous membranes  NECK: Supple, No JVD  CHEST/LUNG: Bilateral crackles.   HEART: Regular rate and rhythm; No murmurs, rubs, or gallops  ABDOMEN: Soft, Nontender, Nondistended; Bowel sounds present  EXTREMITIES:  2+ Peripheral Pulses, No clubbing, cyanosis, or edema        MEDICATIONS  (STANDING):  ALBUTerol/ipratropium for Nebulization 3 milliLiter(s) Nebulizer every 6 hours  allopurinol 150 milliGRAM(s) Oral daily  ampicillin  IVPB 2 Gram(s) IV Intermittent once  atorvastatin 40 milliGRAM(s) Oral at bedtime  bisacodyl 5 milliGRAM(s) Oral at bedtime  dextrose 5%. 1000 milliLiter(s) (50 mL/Hr) IV Continuous <Continuous>  dextrose 50% Injectable 12.5 Gram(s) IV Push once  dextrose 50% Injectable 25 Gram(s) IV Push once  dextrose 50% Injectable 25 Gram(s) IV Push once  docusate sodium 100 milliGRAM(s) Oral three times a day  furosemide   Injectable 40 milliGRAM(s) IV Push daily  gentamicin   IVPB 80 milliGRAM(s) IV Intermittent once  insulin lispro (HumaLOG) corrective regimen sliding scale   SubCutaneous three times a day before meals  mirtazapine 7.5 milliGRAM(s) Oral at bedtime  nystatin Powder 1 Application(s) Topical two times a day  pantoprazole  Injectable 40 milliGRAM(s) IV Push daily  pregabalin 75 milliGRAM(s) Oral three times a day  sodium chloride 0.9% lock flush 3 milliLiter(s) IV Push every 8 hours    MEDICATIONS  (PRN):  acetaminophen   Tablet. 650 milliGRAM(s) Oral every 6 hours PRN T > 100.4 or pain  benzocaine 15 mG/menthol 3.6 mG Lozenge 1 Lozenge Oral two times a day PRN Sore Throat  dextrose 40% Gel 15 Gram(s) Oral once PRN Blood Glucose LESS THAN 70 milliGRAM(s)/deciliter  glucagon  Injectable 1 milliGRAM(s) IntraMuscular once PRN Glucose LESS THAN 70 milligrams/deciliter  ondansetron Injectable 4 milliGRAM(s) IV Push every 6 hours PRN Nausea      Allergies    No Known Allergies    Intolerances          LABS:                          8.6    5.6   )-----------( 165      ( 24 Aug 2018 05:47 )             26.8     08-24    144  |  99  |  20.0  ----------------------------<  109  3.5   |  35.0<H>  |  1.17    Ca    8.2<L>      24 Aug 2018 05:47      PT/INR - ( 24 Aug 2018 05:47 )   PT: 28.0 sec;   INR: 2.50 ratio               RADIOLOGY & ADDITIONAL TESTS:

## 2018-08-24 NOTE — CHART NOTE - NSCHARTNOTEFT_GEN_A_CORE
pt refused. pt educated on the benefits of wearing bipap and the risk of not wearing bipap. pt was advise to call for rt if change mind.

## 2018-08-24 NOTE — PROGRESS NOTE ADULT - SUBJECTIVE AND OBJECTIVE BOX
[facility], [facility]                                                    [address] , [address], [place] [address]                                                         PHONE: [phone]    FAX: [phone] [address], [address], [place] [address]                                                 PHONE: [phone]    FAX: [phone]  *******************************************************************************    Overnight events/Subjective Assessment: comfortable in the bed with no new c/o.     INTERPRETATION OF TELEMETRY (personally reviewed): V paced in 70s    No Known Allergies    MEDICATIONS  (STANDING):  ALBUTerol/ipratropium for Nebulization 3 milliLiter(s) Nebulizer every 6 hours  allopurinol 150 milliGRAM(s) Oral daily  ampicillin  IVPB 2 Gram(s) IV Intermittent once  atorvastatin 40 milliGRAM(s) Oral at bedtime  bisacodyl 5 milliGRAM(s) Oral at bedtime  dextrose 5%. 1000 milliLiter(s) (50 mL/Hr) IV Continuous <Continuous>  dextrose 50% Injectable 12.5 Gram(s) IV Push once  dextrose 50% Injectable 25 Gram(s) IV Push once  dextrose 50% Injectable 25 Gram(s) IV Push once  docusate sodium 100 milliGRAM(s) Oral three times a day  furosemide    Tablet 40 milliGRAM(s) Oral daily  gentamicin   IVPB 80 milliGRAM(s) IV Intermittent once  insulin lispro (HumaLOG) corrective regimen sliding scale   SubCutaneous three times a day before meals  mirtazapine 7.5 milliGRAM(s) Oral at bedtime  nystatin Powder 1 Application(s) Topical two times a day  pantoprazole  Injectable 40 milliGRAM(s) IV Push daily  pregabalin 75 milliGRAM(s) Oral three times a day  sodium chloride 0.9% lock flush 3 milliLiter(s) IV Push every 8 hours    MEDICATIONS  (PRN):  acetaminophen   Tablet. 650 milliGRAM(s) Oral every 6 hours PRN T > 100.4 or pain  benzocaine 15 mG/menthol 3.6 mG Lozenge 1 Lozenge Oral two times a day PRN Sore Throat  dextrose 40% Gel 15 Gram(s) Oral once PRN Blood Glucose LESS THAN 70 milliGRAM(s)/deciliter  glucagon  Injectable 1 milliGRAM(s) IntraMuscular once PRN Glucose LESS THAN 70 milligrams/deciliter  ondansetron Injectable 4 milliGRAM(s) IV Push every 6 hours PRN Nausea      Vital Signs Last 24 Hrs  T(C): 36.6 (24 Aug 2018 05:33), Max: 36.6 (24 Aug 2018 05:33)  T(F): 97.8 (24 Aug 2018 05:33), Max: 97.8 (24 Aug 2018 05:33)  HR: 67 (24 Aug 2018 05:33) (62 - 85)  BP: 135/58 (24 Aug 2018 05:33) (121/59 - 140/50)  BP(mean): --  RR: 18 (24 Aug 2018 05:33) (18 - 19)  SpO2: 97% (24 Aug 2018 05:33) (96% - 97%)    I&O's Detail    I&O's Summary          PHYSICAL EXAM:   General: Appears well developed, well nourished, no acute distress  HEENT: Head: normocephalic, atraumatic  Eyes: Pupils equal and reactive  Neck: Supple, no carotid bruit, no JVD, no HJR  CARDIOVASCULAR: S1/S2 irregularly irregular, normal prosthetic heart sounds, no rub, or gallop  LUNGS: Clear to auscultation bilaterally, no rales, rhonchi or wheeze  ABDOMEN: Soft, nontender, non-distended, positive bowel sounds, no mass or bruit  EXTREMITIES: No edema, distal pulses WNL  SKIN: Warm and dry with normal turgor  NEURO: Alert & oriented x 3, grossly intact  PSYCH: normal mood and affect        LABS:                        8.6    5.6   )-----------( 165      ( 24 Aug 2018 05:47 )             26.8     08-24    144  |  99  |  20.0  ----------------------------<  109  3.5   |  35.0<H>  |  1.17    Ca    8.2<L>      24 Aug 2018 05:47          PT/INR - ( 24 Aug 2018 05:47 )   PT: 28.0 sec;   INR: 2.50 ratio           Serum Pro-Brain Natriuretic Peptide: 4433 pg/mL (08-21 @ 06:55)  serum  Lipids:   Hemoglobin A1C, Whole Blood: 6.2 % (07-30 @ 06:25)    Thyroid Stimulating Hormone, Serum: 4.18 uIU/mL (07-30 @ 06:25)      RADIOLOGY & ADDITIONAL STUDIES:    ECG: < from: 12 Lead ECG (08.02.18 @ 11:55) >  Ventricular-paced rhythm    ECHO: < from: TTE Echo Complete w/Doppler (08.20.18 @ 11:10) >  Summary:   1. Technically difficult study.   2. Hyperdynamic global left ventricular systolic function. Left   ventricular ejection fraction, by visual estimation, is >75%.   3. Moderately increased LV wall thickness.   4. Normal RV size and systolic function.   5. A mechanical valve is present in the mitral position. Normal function.   6. Mild aortic regurgitation.   7. Moderate tricuspid regurgitation.   8. Mild pulmonic insufficiency.   9. Dilated aorta and pulmonary artery.  10. Estimated pulmonary artery systolic pressure is 42.9 mmHg assuming a   right atrial pressure of 3 mmHg, which is consistent with mild pulmonary   hypertension.  11. There is no evidence of pericardial effusion.  12. ** No prior echocardiograms available for comparison.        ASSESSMENT AND PLAN:   In summary, DAVID LUIS is a 83y Male with past medical history significant for HTN, DMII, HLD, CAD s/p remote CABG, mechanical MVR (2012), SSS s/p PPM (2009), moderate b/l carotid disease, and recent acute blood loss anemia with recent admission (GIB), sent to the ER for evaluation for anemia.    - The patient has been chest pain free since admission.  Mildly elevated flat-trending troponin is nonspecific and moreover unchanged from recent admission; doubt acute MI.          -  Echocardiogram from 08/20/2018 reviewed as above.    - No evidence of ischemia or signs of overt volume overload clinically, eventual ischemic evaluation (likely as outpatient).  The patient received lasix yestarday with significant clinical and symptomatic  improvement.  Would give additional Lasix If additional transfusion required and or clinically Indicated.     - Acute anemia (?blood loss) with recent GIB The patient is s/p mechanical mitral valve replacement, maintained on coumadin with goal INR of 2.5-3.5.  This should be continued.  GI following regarding BRBPR.  There is no cardiac contraindication to proceeding with GI procedurres/colonoscopy which may be performed as scheduled.

## 2018-08-24 NOTE — CHART NOTE - NSCHARTNOTEFT_GEN_A_CORE
Results of virtual colonoscopy discussed with Dr Bai. given no mass or lesion and no signs or symptoms of active bleeding will hold off on colonoscopy. ok to resume diet and coumadin.

## 2018-08-25 LAB
ANION GAP SERPL CALC-SCNC: 13 MMOL/L — SIGNIFICANT CHANGE UP (ref 5–17)
BUN SERPL-MCNC: 19 MG/DL — SIGNIFICANT CHANGE UP (ref 8–20)
CALCIUM SERPL-MCNC: 8.3 MG/DL — LOW (ref 8.6–10.2)
CHLORIDE SERPL-SCNC: 98 MMOL/L — SIGNIFICANT CHANGE UP (ref 98–107)
CO2 SERPL-SCNC: 33 MMOL/L — HIGH (ref 22–29)
CREAT SERPL-MCNC: 1.32 MG/DL — HIGH (ref 0.5–1.3)
GLUCOSE BLDC GLUCOMTR-MCNC: 157 MG/DL — HIGH (ref 70–99)
GLUCOSE BLDC GLUCOMTR-MCNC: 161 MG/DL — HIGH (ref 70–99)
GLUCOSE BLDC GLUCOMTR-MCNC: 166 MG/DL — HIGH (ref 70–99)
GLUCOSE BLDC GLUCOMTR-MCNC: 189 MG/DL — HIGH (ref 70–99)
GLUCOSE BLDC GLUCOMTR-MCNC: 218 MG/DL — HIGH (ref 70–99)
GLUCOSE SERPL-MCNC: 148 MG/DL — HIGH (ref 70–115)
HCT VFR BLD CALC: 29.1 % — LOW (ref 42–52)
HGB BLD-MCNC: 9.3 G/DL — LOW (ref 14–18)
INR BLD: 2.07 RATIO — HIGH (ref 0.88–1.16)
MAGNESIUM SERPL-MCNC: 2.3 MG/DL — SIGNIFICANT CHANGE UP (ref 1.6–2.6)
MCHC RBC-ENTMCNC: 30.2 PG — SIGNIFICANT CHANGE UP (ref 27–31)
MCHC RBC-ENTMCNC: 32 G/DL — SIGNIFICANT CHANGE UP (ref 32–36)
MCV RBC AUTO: 94.5 FL — HIGH (ref 80–94)
MRSA PCR RESULT.: SIGNIFICANT CHANGE UP
PLATELET # BLD AUTO: 177 K/UL — SIGNIFICANT CHANGE UP (ref 150–400)
POTASSIUM SERPL-MCNC: 3.7 MMOL/L — SIGNIFICANT CHANGE UP (ref 3.5–5.3)
POTASSIUM SERPL-SCNC: 3.7 MMOL/L — SIGNIFICANT CHANGE UP (ref 3.5–5.3)
PROTHROM AB SERPL-ACNC: 23.1 SEC — HIGH (ref 9.8–12.7)
RBC # BLD: 3.08 M/UL — LOW (ref 4.6–6.2)
RBC # FLD: 20.1 % — HIGH (ref 11–15.6)
S AUREUS DNA NOSE QL NAA+PROBE: SIGNIFICANT CHANGE UP
SODIUM SERPL-SCNC: 144 MMOL/L — SIGNIFICANT CHANGE UP (ref 135–145)
WBC # BLD: 6.9 K/UL — SIGNIFICANT CHANGE UP (ref 4.8–10.8)
WBC # FLD AUTO: 6.9 K/UL — SIGNIFICANT CHANGE UP (ref 4.8–10.8)

## 2018-08-25 PROCEDURE — 99233 SBSQ HOSP IP/OBS HIGH 50: CPT

## 2018-08-25 RX ORDER — WARFARIN SODIUM 2.5 MG/1
3 TABLET ORAL
Qty: 0 | Refills: 0 | Status: DISCONTINUED | OUTPATIENT
Start: 2018-08-25 | End: 2018-08-26

## 2018-08-25 RX ORDER — DOCUSATE SODIUM 100 MG
100 CAPSULE ORAL
Qty: 0 | Refills: 0 | Status: DISCONTINUED | OUTPATIENT
Start: 2018-08-25 | End: 2018-08-27

## 2018-08-25 RX ADMIN — Medication 2: at 08:25

## 2018-08-25 RX ADMIN — Medication 2: at 16:29

## 2018-08-25 RX ADMIN — Medication 150 MILLIGRAM(S): at 11:57

## 2018-08-25 RX ADMIN — SODIUM CHLORIDE 3 MILLILITER(S): 9 INJECTION INTRAMUSCULAR; INTRAVENOUS; SUBCUTANEOUS at 22:00

## 2018-08-25 RX ADMIN — SODIUM CHLORIDE 3 MILLILITER(S): 9 INJECTION INTRAMUSCULAR; INTRAVENOUS; SUBCUTANEOUS at 11:59

## 2018-08-25 RX ADMIN — Medication 100 MILLIGRAM(S): at 11:59

## 2018-08-25 RX ADMIN — Medication 2: at 11:58

## 2018-08-25 RX ADMIN — PANTOPRAZOLE SODIUM 40 MILLIGRAM(S): 20 TABLET, DELAYED RELEASE ORAL at 11:59

## 2018-08-25 RX ADMIN — Medication 40 MILLIGRAM(S): at 05:29

## 2018-08-25 RX ADMIN — NYSTATIN CREAM 1 APPLICATION(S): 100000 CREAM TOPICAL at 05:29

## 2018-08-25 RX ADMIN — SODIUM CHLORIDE 3 MILLILITER(S): 9 INJECTION INTRAMUSCULAR; INTRAVENOUS; SUBCUTANEOUS at 05:24

## 2018-08-25 RX ADMIN — Medication 100 MILLIGRAM(S): at 05:29

## 2018-08-25 RX ADMIN — ATORVASTATIN CALCIUM 40 MILLIGRAM(S): 80 TABLET, FILM COATED ORAL at 21:50

## 2018-08-25 RX ADMIN — NYSTATIN CREAM 1 APPLICATION(S): 100000 CREAM TOPICAL at 17:21

## 2018-08-25 RX ADMIN — WARFARIN SODIUM 3 MILLIGRAM(S): 2.5 TABLET ORAL at 21:50

## 2018-08-25 RX ADMIN — MIRTAZAPINE 7.5 MILLIGRAM(S): 45 TABLET, ORALLY DISINTEGRATING ORAL at 21:50

## 2018-08-25 RX ADMIN — Medication 5 MILLIGRAM(S): at 21:50

## 2018-08-25 NOTE — PROGRESS NOTE ADULT - ASSESSMENT
82 y/o M with hx of CAD s/p CABG, DM II, s/p MVR on coumadin 3.5mg daily, and s/p PPM, known anemia who was recently d/c from [facility] on 8/3/18 s/p GIB for which no intervention occurred now presents with acute on chronic anemia, s/p 2 u prbcs and was to undergo further work up with colonoscopy, but hospital course complicated by acute on chronic diastolic HF exacerbation requiring IV diuresis, noted cxr with fluid overload. Now with improvement in respiratory status. S/p virtual colonoscopy which was negative for any findings and restarted on AC with coumadin. H/h thus far has remained stable. GI and cardio c/w following the pt.     1. Acute on chronic normocytic anemia: Recent Hx GIB   - FOBT negative -no s/s of active bleeding at this time  -Hgb/hct stable after transfusion     -baseline hgb 8-9   -will c/w monitoring h/h   -virtual colonoscopy negative for mass or lesion and no signs or symptoms of active bleeding will hold off on colonoscopy.  -GI f/u noted and appreciated     2. Acute hypoxic respiratory failure secondary to Acute on chronic diastolic CHF 55% exacerbation  - now improving   -pt currently asymptomatic  -euvolemic   -BNP elevated  -lasix 40mg IV x 1 dose given this am, not pt with low bp. D/c lasix for now and will monitor pts clinical status. Will monitor bp closely no bolus or IVF to be given.   -no BB due to hypotension at this time holding metoprolol   -echocardiogram reviewed  -c/w supplemental o2 as needed   -c/w strict I/O   -daily weight    -fluid restrict   -f/u repeat cxr in the am     3. CKD 3   -pt based on prior labs at baseline cr which can fluctuate from 1.1-1.5   -currently 1.3 creatine  -avoid nephrotoxic medications  -c/w monitoring renal fxn     4. Mechanical mitral valve:   - Goal of INR at 2.5 to 3   -currently INR:2   -c/w coumadin 3 mg po qhs   -cleared by GI to proceed with coumadin and monitor for any evidence of bleeding on coumadin   -f/u INR in the am      5. CAD   -Pt is not on ASA or plavix given bleeding risk this was discontinued on prior hospitalization when pt had active GIB   -c/w atorvastatin 40mg po qhs    6. Diabetes mellitus type 2: [id number]  -fs stable   -c/w accuchecks ACHS TID   -c/w HSS  -c/w hypoglycemia protocol     7. Depression  -c/w remeron 7/5mg po qhs     8. DVT ppx  covered on coumadin     Dispo: PT following, repeat evaluation ordered and pt to return to [facility] on discharge.

## 2018-08-25 NOTE — PROGRESS NOTE ADULT - SUBJECTIVE AND OBJECTIVE BOX
INTERVAL HISTORY: Denies CP, SOB, palpitation  	  MEDICATIONS:  furosemide   Injectable 40 milliGRAM(s) IV Push daily  ALBUTerol/ipratropium for Nebulization 3 milliLiter(s) Nebulizer every 6 hours  acetaminophen   Tablet. 650 milliGRAM(s) Oral every 6 hours PRN  mirtazapine 7.5 milliGRAM(s) Oral at bedtime  ondansetron Injectable 4 milliGRAM(s) IV Push every 6 hours PRN  bisacodyl 5 milliGRAM(s) Oral at bedtime  docusate sodium 100 milliGRAM(s) Oral three times a day  pantoprazole  Injectable 40 milliGRAM(s) IV Push daily  allopurinol 150 milliGRAM(s) Oral daily  atorvastatin 40 milliGRAM(s) Oral at bedtime  dextrose 40% Gel 15 Gram(s) Oral once PRN  dextrose 50% Injectable 12.5 Gram(s) IV Push once  dextrose 50% Injectable 25 Gram(s) IV Push once  dextrose 50% Injectable 25 Gram(s) IV Push once  glucagon  Injectable 1 milliGRAM(s) IntraMuscular once PRN  insulin lispro (HumaLOG) corrective regimen sliding scale   SubCutaneous three times a day before meals  benzocaine 15 mG/menthol 3.6 mG Lozenge 1 Lozenge Oral two times a day PRN  dextrose 5%. 1000 milliLiter(s) IV Continuous <Continuous>  nystatin Powder 1 Application(s) Topical two times a day  sodium chloride 0.9% lock flush 3 milliLiter(s) IV Push every 8 hours        PHYSICAL EXAM:  T(C): 36.5 (08-25-18 @ 00:01), Max: 36.7 (08-24-18 @ 19:15)  HR: 61 (08-25-18 @ 00:01) (61 - 80)  BP: 109/60 (08-25-18 @ 00:01) (104/54 - 123/59)  RR: 15 (08-25-18 @ 00:01) (15 - 20)  SpO2: 100% (08-25-18 @ 00:01) (95% - 100%)  Wt(kg): --  I&O's Summary    24 Aug 2018 07:01  -  25 Aug 2018 07:00  --------------------------------------------------------  IN: 240 mL / OUT: 350 mL / NET: -110 mL          Appearance: Normal	  HEENT:   Normal oral mucosa  Cardiovascular: Normal S1 S2, No JVD, No murmurs, No edema  Respiratory: Lungs clear to auscultation	  Psychiatry: A & O x 3, Mood & affect appropriate  Gastrointestinal:  Soft, Non-tender, + BS	  Skin: No rashes, No ecchymoses, No cyanosis  Neurologic: Non-focal  Extremities: Normal range of motion, No clubbing, cyanosis or edema  Vascular: Peripheral pulses palpable 2+ bilaterally  	    LABS:	 	                        8.6    5.6   )-----------( 165      ( 24 Aug 2018 05:47 )             26.8     08-24    144  |  99  |  20.0  ----------------------------<  109  3.5   |  35.0<H>  |  1.17    Ca    8.2<L>      24 Aug 2018 05:47        ASSESSMENT/PLAN: DAVID LUIS is a 83y Male with past medical history significant for HTN, DMII, HLD, CAD s/p remote CABG, mechanical MVR (2012), SSS s/p PPM (2009), moderate b/l carotid disease, and recent acute blood loss anemia with recent admission (GIB), sent to the ER for evaluation for anemia. The patient has been chest pain free since admission.  Mildly elevated flat-trending troponin is nonspecific and moreover unchanged from recent admission; doubt acute MI.  Echocardiogram from 08/20/2018 reviewed as above.   No evidence of ischemia or signs of overt volume overload clinically, eventual ischemic evaluation (likely as outpatient).  The patient received lasix yestarday with significant clinical and symptomatic  improvement.  Would give additional Lasix If additional transfusion required and or clinically Indicated.    Acute anemia (?blood loss) with recent GIB The patient is s/p mechanical mitral valve replacement, maintained on coumadin with goal INR of 2.5-3.5.  This should be continued.  GI following regarding BRBPR.    S/P virtual colonoscopy. Would D/C IV Lasix as pt appears euvolemic at this time.

## 2018-08-25 NOTE — PROGRESS NOTE ADULT - SUBJECTIVE AND OBJECTIVE BOX
Patient is a 83y old  Male who presents with a chief complaint of Anemia (17 Aug 2018 20:50)      HEALTH ISSUES - PROBLEM Dx:  Sacral decubitus ulcer: Sacral decubitus ulcer  Dementia: Dementia  Diabetes: Diabetes  CAD (coronary artery disease): CAD (coronary artery disease)  Mitral valve replaced: Mitral valve replaced  MARIBEL (acute kidney injury): MARIBEL (acute kidney injury)  Anemia: Anemia      INTERVAL HPI/OVERNIGHT EVENTS:  Patient seen and examined at bedside. No acute events overnight. Patient states he is feeling better, sob has improved. Still feels weak and needs PT. Aside from this reports no other complaints. Patient denies chest pain, SOB, abd pain, N/V, fever, chills, dysuria or any other complaints. All remainder ROS negative.     Vital Signs Last 24 Hrs  T(C): 36.8 (25 Aug 2018 08:45), Max: 36.8 (25 Aug 2018 08:45)  T(F): 98.2 (25 Aug 2018 08:45), Max: 98.2 (25 Aug 2018 08:45)  HR: 62 (25 Aug 2018 10:42) (61 - 91)  BP: 89/62 (25 Aug 2018 10:42) (87/54 - 123/59)  BP(mean): --  RR: 18 (25 Aug 2018 08:45) (15 - 18)  SpO2: 99% (25 Aug 2018 08:45) (95% - 100%)    CAPILLARY BLOOD GLUCOSE  POCT Blood Glucose.: 161 mg/dL (25 Aug 2018 11:32)  POCT Blood Glucose.: 166 mg/dL (25 Aug 2018 08:23)  POCT Blood Glucose.: 157 mg/dL (25 Aug 2018 07:48)  POCT Blood Glucose.: 130 mg/dL (24 Aug 2018 17:32)      I&O's Summary    24 Aug 2018 07:01  -  25 Aug 2018 07:00  --------------------------------------------------------  IN: 240 mL / OUT: 350 mL / NET: -110 mL      CONSTITUTIONAL: Well appearing, well nourished, awake, alert and in no apparent distress  CARDIAC: Normal rate, regular rhythm.  Heart sounds S1, S2.  No murmurs, rubs or gallops   RESPIRATORY: Breath sounds clear and equal bilaterally. No wheezes, rhales or rhonchi  GASTROENTEROLOGY: Soft, nt nd bs +normoactive   EXTREMITIES: No edema, cyanosis or deformity   NEUROLOGICAL: Alert and  awake, forgetful at times   SKIN: No rash, skin turgor wnl    MEDICATIONS  (STANDING):  ALBUTerol/ipratropium for Nebulization 3 milliLiter(s) Nebulizer every 6 hours  allopurinol 150 milliGRAM(s) Oral daily  atorvastatin 40 milliGRAM(s) Oral at bedtime  bisacodyl 5 milliGRAM(s) Oral at bedtime  dextrose 5%. 1000 milliLiter(s) (50 mL/Hr) IV Continuous <Continuous>  dextrose 50% Injectable 12.5 Gram(s) IV Push once  dextrose 50% Injectable 25 Gram(s) IV Push once  dextrose 50% Injectable 25 Gram(s) IV Push once  docusate sodium 100 milliGRAM(s) Oral three times a day  insulin lispro (HumaLOG) corrective regimen sliding scale   SubCutaneous three times a day before meals  mirtazapine 7.5 milliGRAM(s) Oral at bedtime  nystatin Powder 1 Application(s) Topical two times a day  pantoprazole  Injectable 40 milliGRAM(s) IV Push daily  sodium chloride 0.9% lock flush 3 milliLiter(s) IV Push every 8 hours    MEDICATIONS  (PRN):  acetaminophen   Tablet. 650 milliGRAM(s) Oral every 6 hours PRN T > 100.4 or pain  benzocaine 15 mG/menthol 3.6 mG Lozenge 1 Lozenge Oral two times a day PRN Sore Throat  dextrose 40% Gel 15 Gram(s) Oral once PRN Blood Glucose LESS THAN 70 milliGRAM(s)/deciliter  glucagon  Injectable 1 milliGRAM(s) IntraMuscular once PRN Glucose LESS THAN 70 milligrams/deciliter  ondansetron Injectable 4 milliGRAM(s) IV Push every 6 hours PRN Nausea      LABS:                        9.3    6.9   )-----------( 177      ( 25 Aug 2018 08:09 )             29.1     08-25    144  |  98  |  19.0  ----------------------------<  148<H>  3.7   |  33.0<H>  |  1.32<H>    Ca    8.3<L>      25 Aug 2018 08:07  Mg     2.3     08-25      PT/INR - ( 25 Aug 2018 08:10 )   PT: 23.1 sec;   INR: 2.07 ratio           RADIOLOGY & ADDITIONAL TESTS:  No new imaging studies

## 2018-08-26 LAB
GLUCOSE BLDC GLUCOMTR-MCNC: 100 MG/DL — HIGH (ref 70–99)
GLUCOSE BLDC GLUCOMTR-MCNC: 107 MG/DL — HIGH (ref 70–99)
GLUCOSE BLDC GLUCOMTR-MCNC: 122 MG/DL — HIGH (ref 70–99)
GLUCOSE BLDC GLUCOMTR-MCNC: 234 MG/DL — HIGH (ref 70–99)
HCT VFR BLD CALC: 28.1 % — LOW (ref 42–52)
HGB BLD-MCNC: 8.9 G/DL — LOW (ref 14–18)
INR BLD: 1.54 RATIO — HIGH (ref 0.88–1.16)
PROTHROM AB SERPL-ACNC: 17.1 SEC — HIGH (ref 9.8–12.7)

## 2018-08-26 PROCEDURE — 71045 X-RAY EXAM CHEST 1 VIEW: CPT | Mod: 26

## 2018-08-26 PROCEDURE — 99232 SBSQ HOSP IP/OBS MODERATE 35: CPT

## 2018-08-26 RX ORDER — WARFARIN SODIUM 2.5 MG/1
3 TABLET ORAL ONCE
Qty: 0 | Refills: 0 | Status: COMPLETED | OUTPATIENT
Start: 2018-08-26 | End: 2018-08-26

## 2018-08-26 RX ADMIN — SODIUM CHLORIDE 3 MILLILITER(S): 9 INJECTION INTRAMUSCULAR; INTRAVENOUS; SUBCUTANEOUS at 23:21

## 2018-08-26 RX ADMIN — Medication 5 MILLIGRAM(S): at 23:18

## 2018-08-26 RX ADMIN — MIRTAZAPINE 7.5 MILLIGRAM(S): 45 TABLET, ORALLY DISINTEGRATING ORAL at 23:18

## 2018-08-26 RX ADMIN — NYSTATIN CREAM 1 APPLICATION(S): 100000 CREAM TOPICAL at 06:43

## 2018-08-26 RX ADMIN — PANTOPRAZOLE SODIUM 40 MILLIGRAM(S): 20 TABLET, DELAYED RELEASE ORAL at 11:42

## 2018-08-26 RX ADMIN — Medication 4: at 11:42

## 2018-08-26 RX ADMIN — SODIUM CHLORIDE 3 MILLILITER(S): 9 INJECTION INTRAMUSCULAR; INTRAVENOUS; SUBCUTANEOUS at 12:49

## 2018-08-26 RX ADMIN — ATORVASTATIN CALCIUM 40 MILLIGRAM(S): 80 TABLET, FILM COATED ORAL at 23:18

## 2018-08-26 RX ADMIN — NYSTATIN CREAM 1 APPLICATION(S): 100000 CREAM TOPICAL at 16:53

## 2018-08-26 RX ADMIN — WARFARIN SODIUM 3 MILLIGRAM(S): 2.5 TABLET ORAL at 23:18

## 2018-08-26 RX ADMIN — Medication 150 MILLIGRAM(S): at 11:42

## 2018-08-26 RX ADMIN — SODIUM CHLORIDE 3 MILLILITER(S): 9 INJECTION INTRAMUSCULAR; INTRAVENOUS; SUBCUTANEOUS at 06:43

## 2018-08-26 NOTE — PROGRESS NOTE ADULT - ASSESSMENT
84 y/o M with hx of CAD s/p CABG, DM II, s/p MVR on coumadin 3.5mg daily, and s/p PPM, known anemia who was recently d/c from [facility] on 8/3/18 s/p GIB for which no intervention occurred now presents with acute on chronic anemia, s/p 2 u prbcs and was to undergo further work up with colonoscopy, but hospital course complicated by acute on chronic diastolic HF exacerbation requiring IV diuresis, noted cxr with fluid overload. Now with improvement in respiratory status and lasix discontinued. S/p virtual colonoscopy which was negative for any findings and restarted on AC with coumadin. H/h thus far has remained stable. GI and cardio c/w following the pt.     1. Acute on chronic normocytic anemia: Recent Hx GIB   - FOBT negative -no s/s of active bleeding at this time  -Hgb/hct stable after transfusion     -baseline hgb 8-9   -will c/w monitoring h/h   -virtual colonoscopy negative for mass or lesion and no signs or symptoms of active bleeding will hold off on colonoscopy.  -GI f/u noted and appreciated     2. Acute hypoxic respiratory failure secondary to Acute on chronic diastolic CHF 55% exacerbation  - now improving   -pt currently asymptomatic  -euvolemic   -BNP elevated  -lasix discontinued as per cardio   -no BB due to hypotension at this time holding metoprolol   -echocardiogram reviewed  -c/w supplemental o2 as needed will taper as pt tolerates   -c/w strict I/O   -daily weight    -fluid restrict   -repeat cxr ordered and pending     3. CKD 3   -pt based on prior labs at baseline cr which can fluctuate from 1.1-1.5   -currently 1.3 creatine  -avoid nephrotoxic medications  -c/w monitoring renal fxn     4. Mechanical mitral valve:   - Goal of INR at 2.5 to 3   -currently INR subtherapeutic 1.54  -c/w coumadin 5 mg po qhs   -cleared by GI to proceed with coumadin and monitor for any evidence of bleeding on coumadin   -f/u INR in the am      5. CAD   -Pt is not on ASA or plavix given bleeding risk this was discontinued on prior hospitalization when pt had active GIB   -c/w atorvastatin 40mg po qhs    6. Diabetes mellitus type 2: [id number]  -fs stable   -c/w accuchecks ACHS TID   -c/w HSS  -c/w hypoglycemia protocol     7. Depression  -c/w remeron 7.5mg po qhs     8. DVT ppx  covered on coumadin     Dispo: PT following, repeat evaluation ordered, pt does not want to return to [facility] and wants to go home. Reports he has 1 step to go into his home and that he resides on the first floor. Will d/w DAY, SW and his wife.

## 2018-08-26 NOTE — PROGRESS NOTE ADULT - SUBJECTIVE AND OBJECTIVE BOX
INTERVAL HISTORY: Denies CP,SOB, palpitation, syncope  	  MEDICATIONS:  ALBUTerol/ipratropium for Nebulization 3 milliLiter(s) Nebulizer every 6 hours  acetaminophen   Tablet. 650 milliGRAM(s) Oral every 6 hours PRN  mirtazapine 7.5 milliGRAM(s) Oral at bedtime  ondansetron Injectable 4 milliGRAM(s) IV Push every 6 hours PRN  bisacodyl 5 milliGRAM(s) Oral at bedtime  docusate sodium 100 milliGRAM(s) Oral two times a day PRN  pantoprazole  Injectable 40 milliGRAM(s) IV Push daily  allopurinol 150 milliGRAM(s) Oral daily  atorvastatin 40 milliGRAM(s) Oral at bedtime  dextrose 40% Gel 15 Gram(s) Oral once PRN  dextrose 50% Injectable 12.5 Gram(s) IV Push once  dextrose 50% Injectable 25 Gram(s) IV Push once  dextrose 50% Injectable 25 Gram(s) IV Push once  glucagon  Injectable 1 milliGRAM(s) IntraMuscular once PRN  insulin lispro (HumaLOG) corrective regimen sliding scale   SubCutaneous three times a day before meals  benzocaine 15 mG/menthol 3.6 mG Lozenge 1 Lozenge Oral two times a day PRN  dextrose 5%. 1000 milliLiter(s) IV Continuous <Continuous>  nystatin Powder 1 Application(s) Topical two times a day  sodium chloride 0.9% lock flush 3 milliLiter(s) IV Push every 8 hours  warfarin 3 milliGRAM(s) Oral <User Schedule>        PHYSICAL EXAM:  T(C): 36.6 (08-26-18 @ 07:55), Max: 36.6 (08-26-18 @ 07:55)  HR: 63 (08-26-18 @ 07:55) (62 - 90)  BP: 134/57 (08-26-18 @ 07:55) (88/55 - 134/57)  RR: 15 (08-25-18 @ 23:30) (15 - 15)  SpO2: 100% (08-26-18 @ 07:55) (94% - 100%)  Wt(kg): --  I&O's Summary        Appearance: Normal	  HEENT:   Normal oral mucosa  Cardiovascular: Normal mechanical tone  Respiratory: Lungs clear to auscultation	  Psychiatry: A & O x 3, Mood & affect appropriate  Gastrointestinal:  Soft, Non-tender, + BS	  Skin: No rashes, No ecchymoses, No cyanosis  Neurologic: Non-focal  Extremities: Normal range of motion, No clubbing, cyanosis or edema  Vascular: Peripheral pulses palpable 2+ bilaterally    TELEMETRY: 	    paced	    LABS:	 	                          8.9    x     )-----------( x        ( 26 Aug 2018 08:03 )             28.1     08-25    144  |  98  |  19.0  ----------------------------<  148<H>  3.7   |  33.0<H>  |  1.32<H>    Ca    8.3<L>      25 Aug 2018 08:07  Mg     2.3     08-25        ASSESSMENT/PLAN: DAVID LUIS is a 83y Male with past medical history significant for HTN, DMII, HLD, CAD s/p remote CABG, mechanical MVR (2012), SSS s/p PPM (2009), moderate b/l carotid disease, and recent acute blood loss anemia with recent admission (GIB), sent to the ER for evaluation for anemia. The patient has been chest pain free since admission.  Mildly elevated flat-trending troponin is nonspecific and moreover unchanged from recent admission; doubt acute MI.  Echocardiogram from 08/20/2018 reviewed as above.   No evidence of ischemia or signs of overt volume overload clinically, eventual ischemic evaluation (likely as outpatient).  The patient received lasix yestarday with significant clinical and symptomatic  improvement.  Would give additional Lasix If additional transfusion required and or clinically Indicated.    Acute anemia (?blood loss) with recent GIB The patient is s/p mechanical mitral valve replacement, maintained on coumadin with goal INR of 2.5-3.5.  This should be continued.    GI following regarding BRBPR.    S/P virtual colonoscopy. Stable CV status at this time.

## 2018-08-26 NOTE — PROGRESS NOTE ADULT - SUBJECTIVE AND OBJECTIVE BOX
Patient is a 83y old  Male who presents with a chief complaint of Anemia (17 Aug 2018 20:50)      HEALTH ISSUES - PROBLEM Dx:  Sacral decubitus ulcer: Sacral decubitus ulcer  Dementia: Dementia  Diabetes: Diabetes  CAD (coronary artery disease): CAD (coronary artery disease)  Mitral valve replaced: Mitral valve replaced  MARIBEL (acute kidney injury): MARIBEL (acute kidney injury)  Anemia: Anemia      INTERVAL HPI/OVERNIGHT EVENTS:  Patient seen and examined at bedside. No acute events overnight. Patient states he is feeling much better today, sob improved. Aside from this has no complaints. Reports he would never go back to [facility] wants to go home. D/w him that he has to participate in PT he said he will to determine disposition. Patient denies chest pain, abd pain, N/V, fever, chills, dysuria or any other complaints. All remainder ROS negative.       Vital Signs Last 24 Hrs  T(C): 36.6 (26 Aug 2018 07:55), Max: 36.6 (26 Aug 2018 07:55)  T(F): 97.9 (26 Aug 2018 07:55), Max: 97.9 (26 Aug 2018 07:55)  HR: 63 (26 Aug 2018 07:55) (63 - 90)  BP: 134/57 (26 Aug 2018 07:55) (103/59 - 134/57)  BP(mean): --  RR: 15 (25 Aug 2018 23:30) (15 - 15)  SpO2: 100% (26 Aug 2018 07:55) (94% - 100%)    CAPILLARY BLOOD GLUCOSE  POCT Blood Glucose.: 234 mg/dL (26 Aug 2018 10:57)  POCT Blood Glucose.: 122 mg/dL (26 Aug 2018 07:15)  POCT Blood Glucose.: 218 mg/dL (25 Aug 2018 21:48)  POCT Blood Glucose.: 189 mg/dL (25 Aug 2018 16:27)    CONSTITUTIONAL: Well appearing, well nourished, awake, alert and in no apparent distress  CARDIAC: Normal rate, regular rhythm.  Heart sounds S1, S2.  No murmurs, rubs or gallops   RESPIRATORY: Breath sounds clear and equal bilaterally. No wheezes, rhales or rhonchi  GASTROENTEROLOGY: Soft, nt nd bs +normoactive   EXTREMITIES: No edema, cyanosis or deformity   NEUROLOGICAL: Alert and  awake, forgetful at times   SKIN: No rash, skin turgor wnl      MEDICATIONS  (STANDING):  allopurinol 150 milliGRAM(s) Oral daily  atorvastatin 40 milliGRAM(s) Oral at bedtime  bisacodyl 5 milliGRAM(s) Oral at bedtime  dextrose 5%. 1000 milliLiter(s) (50 mL/Hr) IV Continuous <Continuous>  dextrose 50% Injectable 12.5 Gram(s) IV Push once  dextrose 50% Injectable 25 Gram(s) IV Push once  dextrose 50% Injectable 25 Gram(s) IV Push once  insulin lispro (HumaLOG) corrective regimen sliding scale   SubCutaneous three times a day before meals  mirtazapine 7.5 milliGRAM(s) Oral at bedtime  nystatin Powder 1 Application(s) Topical two times a day  pantoprazole  Injectable 40 milliGRAM(s) IV Push daily  sodium chloride 0.9% lock flush 3 milliLiter(s) IV Push every 8 hours  warfarin 3 milliGRAM(s) Oral <User Schedule>    MEDICATIONS  (PRN):  acetaminophen   Tablet. 650 milliGRAM(s) Oral every 6 hours PRN T > 100.4 or pain  benzocaine 15 mG/menthol 3.6 mG Lozenge 1 Lozenge Oral two times a day PRN Sore Throat  dextrose 40% Gel 15 Gram(s) Oral once PRN Blood Glucose LESS THAN 70 milliGRAM(s)/deciliter  docusate sodium 100 milliGRAM(s) Oral two times a day PRN Constipation  glucagon  Injectable 1 milliGRAM(s) IntraMuscular once PRN Glucose LESS THAN 70 milligrams/deciliter  ondansetron Injectable 4 milliGRAM(s) IV Push every 6 hours PRN Nausea      LABS:                        8.9    x     )-----------( x        ( 26 Aug 2018 08:03 )             28.1     08-25    144  |  98  |  19.0  ----------------------------<  148<H>  3.7   |  33.0<H>  |  1.32<H>    Ca    8.3<L>      25 Aug 2018 08:07  Mg     2.3     08-25      PT/INR - ( 26 Aug 2018 08:03 )   PT: 17.1 sec;   INR: 1.54 ratio          RADIOLOGY & ADDITIONAL TESTS:  No new imaging studies

## 2018-08-27 ENCOUNTER — TRANSCRIPTION ENCOUNTER (OUTPATIENT)
Age: 83
End: 2018-08-27

## 2018-08-27 VITALS — WEIGHT: 165.13 LBS

## 2018-08-27 LAB
GLUCOSE BLDC GLUCOMTR-MCNC: 129 MG/DL — HIGH (ref 70–99)
GLUCOSE BLDC GLUCOMTR-MCNC: 205 MG/DL — HIGH (ref 70–99)
HCT VFR BLD CALC: 27.2 % — LOW (ref 42–52)
HGB BLD-MCNC: 8.6 G/DL — LOW (ref 14–18)
INR BLD: 1.36 RATIO — HIGH (ref 0.88–1.16)
PROTHROM AB SERPL-ACNC: 15 SEC — HIGH (ref 9.8–12.7)

## 2018-08-27 PROCEDURE — 83735 ASSAY OF MAGNESIUM: CPT

## 2018-08-27 PROCEDURE — 83605 ASSAY OF LACTIC ACID: CPT

## 2018-08-27 PROCEDURE — 85018 HEMOGLOBIN: CPT

## 2018-08-27 PROCEDURE — 99285 EMERGENCY DEPT VISIT HI MDM: CPT | Mod: 25

## 2018-08-27 PROCEDURE — 87641 MR-STAPH DNA AMP PROBE: CPT

## 2018-08-27 PROCEDURE — 86900 BLOOD TYPING SEROLOGIC ABO: CPT

## 2018-08-27 PROCEDURE — 85014 HEMATOCRIT: CPT

## 2018-08-27 PROCEDURE — 85730 THROMBOPLASTIN TIME PARTIAL: CPT

## 2018-08-27 PROCEDURE — P9016: CPT

## 2018-08-27 PROCEDURE — 71045 X-RAY EXAM CHEST 1 VIEW: CPT

## 2018-08-27 PROCEDURE — 93306 TTE W/DOPPLER COMPLETE: CPT

## 2018-08-27 PROCEDURE — 85610 PROTHROMBIN TIME: CPT

## 2018-08-27 PROCEDURE — 82728 ASSAY OF FERRITIN: CPT

## 2018-08-27 PROCEDURE — 82272 OCCULT BLD FECES 1-3 TESTS: CPT

## 2018-08-27 PROCEDURE — 86923 COMPATIBILITY TEST ELECTRIC: CPT

## 2018-08-27 PROCEDURE — 86850 RBC ANTIBODY SCREEN: CPT

## 2018-08-27 PROCEDURE — 94640 AIRWAY INHALATION TREATMENT: CPT

## 2018-08-27 PROCEDURE — 82962 GLUCOSE BLOOD TEST: CPT

## 2018-08-27 PROCEDURE — 83880 ASSAY OF NATRIURETIC PEPTIDE: CPT

## 2018-08-27 PROCEDURE — 85027 COMPLETE CBC AUTOMATED: CPT

## 2018-08-27 PROCEDURE — 84484 ASSAY OF TROPONIN QUANT: CPT

## 2018-08-27 PROCEDURE — 99239 HOSP IP/OBS DSCHRG MGMT >30: CPT

## 2018-08-27 PROCEDURE — 94660 CPAP INITIATION&MGMT: CPT

## 2018-08-27 PROCEDURE — 84466 ASSAY OF TRANSFERRIN: CPT

## 2018-08-27 PROCEDURE — 86901 BLOOD TYPING SEROLOGIC RH(D): CPT

## 2018-08-27 PROCEDURE — 80053 COMPREHEN METABOLIC PANEL: CPT

## 2018-08-27 PROCEDURE — 74261 CT COLONOGRAPHY DX: CPT

## 2018-08-27 PROCEDURE — 36430 TRANSFUSION BLD/BLD COMPNT: CPT

## 2018-08-27 PROCEDURE — 87640 STAPH A DNA AMP PROBE: CPT

## 2018-08-27 PROCEDURE — 80048 BASIC METABOLIC PNL TOTAL CA: CPT

## 2018-08-27 PROCEDURE — 36415 COLL VENOUS BLD VENIPUNCTURE: CPT

## 2018-08-27 PROCEDURE — 83615 LACTATE (LD) (LDH) ENZYME: CPT

## 2018-08-27 PROCEDURE — 71046 X-RAY EXAM CHEST 2 VIEWS: CPT

## 2018-08-27 PROCEDURE — 97163 PT EVAL HIGH COMPLEX 45 MIN: CPT

## 2018-08-27 PROCEDURE — 94760 N-INVAS EAR/PLS OXIMETRY 1: CPT

## 2018-08-27 PROCEDURE — 84100 ASSAY OF PHOSPHORUS: CPT

## 2018-08-27 PROCEDURE — 83550 IRON BINDING TEST: CPT

## 2018-08-27 RX ORDER — ACETAMINOPHEN 500 MG
2 TABLET ORAL
Qty: 0 | Refills: 0 | COMMUNITY

## 2018-08-27 RX ORDER — DOCUSATE SODIUM 100 MG
1 CAPSULE ORAL
Qty: 0 | Refills: 0 | COMMUNITY
Start: 2018-08-27

## 2018-08-27 RX ORDER — NYSTATIN CREAM 100000 [USP'U]/G
1 CREAM TOPICAL
Qty: 0 | Refills: 0 | COMMUNITY
Start: 2018-08-27

## 2018-08-27 RX ORDER — MIRTAZAPINE 45 MG/1
1 TABLET, ORALLY DISINTEGRATING ORAL
Qty: 0 | Refills: 0 | COMMUNITY
Start: 2018-08-27

## 2018-08-27 RX ORDER — MIRTAZAPINE 45 MG/1
1 TABLET, ORALLY DISINTEGRATING ORAL
Qty: 0 | Refills: 0 | COMMUNITY

## 2018-08-27 RX ORDER — ATORVASTATIN CALCIUM 80 MG/1
1 TABLET, FILM COATED ORAL
Qty: 0 | Refills: 0 | COMMUNITY
Start: 2018-08-27

## 2018-08-27 RX ORDER — WARFARIN SODIUM 2.5 MG/1
3.5 TABLET ORAL
Qty: 0 | Refills: 0 | COMMUNITY

## 2018-08-27 RX ORDER — ERYTHROPOIETIN 10000 [IU]/ML
1 INJECTION, SOLUTION INTRAVENOUS; SUBCUTANEOUS
Qty: 0 | Refills: 0 | COMMUNITY

## 2018-08-27 RX ORDER — INSULIN LISPRO 100/ML
0 VIAL (ML) SUBCUTANEOUS
Qty: 0 | Refills: 0 | COMMUNITY

## 2018-08-27 RX ORDER — WARFARIN SODIUM 2.5 MG/1
1 TABLET ORAL
Qty: 0 | Refills: 0 | COMMUNITY

## 2018-08-27 RX ORDER — ACETAMINOPHEN 500 MG
2 TABLET ORAL
Qty: 0 | Refills: 0 | COMMUNITY
Start: 2018-08-27

## 2018-08-27 RX ORDER — ATORVASTATIN CALCIUM 80 MG/1
1 TABLET, FILM COATED ORAL
Qty: 0 | Refills: 0 | COMMUNITY

## 2018-08-27 RX ADMIN — Medication 150 MILLIGRAM(S): at 12:02

## 2018-08-27 RX ADMIN — Medication 4: at 11:59

## 2018-08-27 RX ADMIN — NYSTATIN CREAM 1 APPLICATION(S): 100000 CREAM TOPICAL at 06:24

## 2018-08-27 RX ADMIN — SODIUM CHLORIDE 3 MILLILITER(S): 9 INJECTION INTRAMUSCULAR; INTRAVENOUS; SUBCUTANEOUS at 14:41

## 2018-08-27 RX ADMIN — SODIUM CHLORIDE 3 MILLILITER(S): 9 INJECTION INTRAMUSCULAR; INTRAVENOUS; SUBCUTANEOUS at 06:25

## 2018-08-27 RX ADMIN — PANTOPRAZOLE SODIUM 40 MILLIGRAM(S): 20 TABLET, DELAYED RELEASE ORAL at 12:02

## 2018-08-27 NOTE — DISCHARGE NOTE ADULT - HOSPITAL COURSE
84 y/o M with hx of CAD s/p CABG, DM II, s/p MVR on coumadin 3.5mg daily, and s/p PPM, known anemia who was recently d/c from [facility] on 8/3/18 s/p GIB for which no intervention occurred now presents with acute on chronic anemia, s/p 2 u prbcs and was to undergo further work up with colonoscopy, but hospital course complicated by acute on chronic diastolic HF exacerbation requiring IV diuresis, noted cxr with fluid overload. Now with improvement in respiratory status and lasix discontinued. S/p virtual colonoscopy which was negative for any findings and restarted on AC with coumadin. H/h thus far has remained stable. GI and cardio c/w following the pt.       Discharge planning took 45 minutes

## 2018-08-27 NOTE — DISCHARGE NOTE ADULT - ADDITIONAL INSTRUCTIONS
Please follow up with physician at the facility, follow up with gastroenterologist and cardiologist in 1-2 weeks.

## 2018-08-27 NOTE — DISCHARGE NOTE ADULT - MEDICATION SUMMARY - MEDICATIONS TO CHANGE
I will SWITCH the dose or number of times a day I take the medications listed below when I get home from the hospital:    warfarin 1 mg oral tablet  -- 3.5 milligram(s) by mouth once a day    Remeron 15 mg oral tablet  -- 1 tab(s) by mouth once a day (at bedtime)    warfarin 2.5 mg oral tablet  -- 1 tab(s) by mouth once a day

## 2018-08-27 NOTE — DISCHARGE NOTE ADULT - CARE PROVIDER_API CALL
Luis M Bai), Gastroenterology; Internal Medicine  [address]  [address]  [place] [address]  Phone: [phone]  Fax: [phone]    Olegario Alvarado), Cardiovascular Disease; Internal Medicine  [address]  [place] [address]  Phone: [phone]  Fax: [phone]

## 2018-08-27 NOTE — DISCHARGE NOTE ADULT - CARE PLAN
Principal Discharge DX:	Anemia  Goal:	prevent worsening  Assessment and plan of treatment:	Baseline hgb 8-9. Currently at baseline, is on coumadin and had prior bleed, current virtual colonoscopy negative. Please continue with following with Dr. Bai gastroenterologist as an outpatient.  Secondary Diagnosis:	Acute on chronic diastolic heart failure  Assessment and plan of treatment:	Continue with weight monitoring, fluid restriction one liter. Would recommend lasix to be given 20mg po 2 x a week as needed for excess peripheral edema or gain in weight. Continue with supplemental oxygen. Repeat cxr in 1-2 weeks.  Secondary Diagnosis:	Mitral valve replaced  Assessment and plan of treatment:	Patient has a mechanical valve, will need to continue with coumadin therapy, currently subtherapeutic INR:1.3, goal is 2.5-3.5 and coumadin increased to 5mg po qhs for tonight to be given. Please check INR in the am and adjust dose as per physician at the facility. Monitor dosage carefully patient had prior GI bleeding and has underlying issues with anemia.  Secondary Diagnosis:	CAD (coronary artery disease)  Assessment and plan of treatment:	Continue with current medications, follow up with cardiology in 1-2 weeks for further medication adjustments.  Secondary Diagnosis:	Diabetes mellitus, type 2  Assessment and plan of treatment:	Continue with prior medications no adjustments made, HBA1C: 6.2.  Secondary Diagnosis:	Hyponatremia  Assessment and plan of treatment:	Continue with medications as prescribed, no medication adjustments made. Please monitor sodium closely to ensure at a normal range for the patient. Follow with nephrology if needed.  Secondary Diagnosis:	Chronic pain  Assessment and plan of treatment:	CKD stage 3  Continue with avoiding nephrotoxic medications. Please continue to monitor renal function and recommend further evaluation with nephrology as an outpatient.

## 2018-08-27 NOTE — DISCHARGE NOTE ADULT - PATIENT PORTAL LINK FT
You can access the [facility] Patient Portal, offered by [facility], by registering with the following website: [email or web address]

## 2018-08-27 NOTE — PROGRESS NOTE ADULT - SUBJECTIVE AND OBJECTIVE BOX
INTERVAL HISTORY: Denies CP,SOB, palpitation, syncope  	  MEDICATIONS:  ALBUTerol/ipratropium for Nebulization 3 milliLiter(s) Nebulizer every 6 hours  acetaminophen   Tablet. 650 milliGRAM(s) Oral every 6 hours PRN  mirtazapine 7.5 milliGRAM(s) Oral at bedtime  ondansetron Injectable 4 milliGRAM(s) IV Push every 6 hours PRN  bisacodyl 5 milliGRAM(s) Oral at bedtime  docusate sodium 100 milliGRAM(s) Oral two times a day PRN  pantoprazole  Injectable 40 milliGRAM(s) IV Push daily  allopurinol 150 milliGRAM(s) Oral daily  atorvastatin 40 milliGRAM(s) Oral at bedtime  dextrose 40% Gel 15 Gram(s) Oral once PRN  dextrose 50% Injectable 12.5 Gram(s) IV Push once  dextrose 50% Injectable 25 Gram(s) IV Push once  dextrose 50% Injectable 25 Gram(s) IV Push once  glucagon  Injectable 1 milliGRAM(s) IntraMuscular once PRN  insulin lispro (HumaLOG) corrective regimen sliding scale   SubCutaneous three times a day before meals  benzocaine 15 mG/menthol 3.6 mG Lozenge 1 Lozenge Oral two times a day PRN  dextrose 5%. 1000 milliLiter(s) IV Continuous <Continuous>  nystatin Powder 1 Application(s) Topical two times a day  sodium chloride 0.9% lock flush 3 milliLiter(s) IV Push every 8 hours  warfarin 3 milliGRAM(s) Oral <User Schedule>        PHYSICAL EXAM:  Vital Signs Last 24 Hrs  T(C): 36.6 (27 Aug 2018 08:21), Max: 36.7 (26 Aug 2018 23:56)  T(F): 97.8 (27 Aug 2018 08:21), Max: 98.1 (26 Aug 2018 23:56)  HR: 63 (27 Aug 2018 08:21) (63 - 67)  BP: 129/51 (27 Aug 2018 08:21) (103/54 - 129/51)  RR: 18 (27 Aug 2018 08:21) (16 - 18)  SpO2: 96% (27 Aug 2018 08:21) (93% - 96%)    GENERAL: sitting up comfortably in bed in nad  HEENT:   Normal oral mucosa  Cardiovascular: Normal mechanical tone  Respiratory: Lungs clear to auscultation	  Psychiatry: A & O x 3, Mood & affect appropriate  Gastrointestinal:  Soft, Non-tender, + BS	  Skin: No rashes, No ecchymoses, No cyanosis  Neurologic: Non-focal  Extremities: Normal range of motion, No clubbing, cyanosis or edema  Vascular: Peripheral pulses palpable 2+ bilaterally    TELEMETRY: 	    paced	    LABS:	 	                        8.6    x     )-----------( x        ( 27 Aug 2018 08:53 )             27.2     Echocardiogram (08/20/2018):  Summary:   1. Technically difficult study.   2. Hyperdynamic global left ventricular systolic function. Left   ventricular ejection fraction, by visual estimation, is >75%.   3. Moderately increased LV wall thickness.   4. Normal RV size and systolic function.   5. A mechanical valve is present in the mitral position. Normal function.   6. Mild aortic regurgitation.   7. Moderate tricuspid regurgitation.   8. Mild pulmonic insufficiency.   9. Dilated aorta and pulmonary artery.  10. Estimated pulmonary artery systolic pressure is 42.9 mmHg assuming a   right atrial pressure of 3 mmHg, which is consistent with mild pulmonary   hypertension.  11. There is no evidence of pericardial effusion.  12. ** No prior echocardiograms available for comparison.                ASSESSMENT/PLAN: DAVID LUIS is a 83y Male with past medical history significant for HTN, DMII, HLD, CAD s/p remote CABG, mechanical MVR (2012), SSS s/p PPM (2009), moderate b/l carotid disease, and recent acute blood loss anemia with recent admission (GIB), sent to the ER for evaluation for anemia.     - The patient has been chest pain free since admission.  Mildly elevated flat-trending troponin is nonspecific and moreover unchanged from recent admission; doubt acute MI.    - Echocardiogram from 08/20/2018 reviewed as above.   - No evidence of ischemia or signs of overt volume overload clinically, eventual ischemic evaluation (likely as outpatient).  The patient required one dose of lasix last week following blood transfusions.  Would give additional Lasix If additional transfusion required and or clinically Indicated.    - Acute anemia (?blood loss) with recent GIB The patient is s/p mechanical mitral valve replacement, maintained on coumadin with goal INR of 2.5-3.5.  This should be continued.    - GI following regarding BRBPR.  S/P virtual colonoscopy. Stable CV status at this time.	  - No further inpatient cardiovascular evaluation indicated at this time.    - We will follow up as needed; please do not hesitate to call with any questions or concerns.

## 2018-08-27 NOTE — DISCHARGE NOTE ADULT - MEDICATION SUMMARY - MEDICATIONS TO TAKE
I will START or STAY ON the medications listed below when I get home from the hospital:    acetaminophen 325 mg oral tablet  -- 2 tab(s) by mouth every 6 hours, As needed, T > 100.4 or pain  -- Indication: For pain or fever     Coumadin 5 mg oral tablet  -- 1 tab(s) by mouth once a day (at bedtime)  please check INR in the morning and adjust coumadin as per INR   Goal INR is 2.5-3.5; patient has history of anemia and prior GIB please monitor closely needs coumadin for mechanical valve   -- Indication: For Mechanical valve     Lyrica 75 mg oral capsule  -- 1 cap(s) by mouth 3 times a day  -- Indication: For pain     mirtazapine 7.5 mg oral tablet  -- 1 tab(s) by mouth once a day (at bedtime)  -- Indication: For Depression and appetite stimulant     metFORMIN 500 mg oral tablet  -- 1 tab(s) by mouth 2 times a day  -- Indication: For Diabetes mellitus type 2    HumaLOG 100 units/mL subcutaneous solution  -- As per scale   -- Indication: For Diabetes mellitus type 2    allopurinol  -- 150 milligram(s) by mouth once a day (in the morning)  -- Indication: For gout     atorvastatin 40 mg oral tablet  -- 1 tab(s) by mouth once a day (at bedtime)  -- Indication: For hypelripidemia     DuoNeb 0.5 mg-2.5 mg/3 mL inhalation solution  -- 3 milliliter(s) inhaled 3 times a day  -- Indication: For Shortness of breath     nystatin 100,000 units/g topical powder  -- 1 application on skin 2 times a day  -- Indication: For rash     ferrous sulfate 325 mg (65 mg elemental iron) oral tablet  -- 1 tab(s) by mouth 2 times a day  -- Indication: For Anemia     docusate sodium 100 mg oral capsule  -- 1 cap(s) by mouth 2 times a day, As needed, Constipation  -- Indication: For Constipation     bisacodyl 5 mg oral delayed release tablet  -- 1 tab(s) by mouth once a day (at bedtime), As Needed  -- Indication: For Constipation     Sodium Chloride 1 g oral tablet  -- 1 tab(s) by mouth 2 times a day  -- Indication: For MARIBEL (acute kidney injury)    pantoprazole 40 mg oral delayed release tablet  -- 1 tab(s) by mouth once a day (before a meal)  -- Indication: For prophylaxis     Multiple Vitamins oral tablet  -- 1 tab(s) by mouth once a day  -- Indication: For Supplementation

## 2018-08-27 NOTE — PROGRESS NOTE ADULT - PROVIDER SPECIALTY LIST ADULT
Anesthesia
Cardiology
Gastroenterology
Gastroenterology
Internal Medicine
Pulmonology
Cardiology
Internal Medicine
Hospitalist
Internal Medicine

## 2018-08-27 NOTE — DISCHARGE NOTE ADULT - PLAN OF CARE
prevent worsening Baseline hgb 8-9. Currently at baseline, is on coumadin and had prior bleed, current virtual colonoscopy negative. Please continue with following with Dr. Bai gastroenterologist as an outpatient. Continue with weight monitoring, fluid restriction one liter. Would recommend lasix to be given 20mg po 2 x a week as needed for excess peripheral edema or gain in weight. Continue with supplemental oxygen. Repeat cxr in 1-2 weeks. Patient has a mechanical valve, will need to continue with coumadin therapy, currently subtherapeutic INR:1.3, goal is 2.5-3.5 and coumadin increased to 5mg po qhs for tonight to be given. Please check INR in the am and adjust dose as per physician at the facility. Monitor dosage carefully patient had prior GI bleeding and has underlying issues with anemia. Continue with current medications, follow up with cardiology in 1-2 weeks for further medication adjustments. Continue with prior medications no adjustments made, HBA1C: 6.2. Continue with medications as prescribed, no medication adjustments made. Please monitor sodium closely to ensure at a normal range for the patient. Follow with nephrology if needed. CKD stage 3  Continue with avoiding nephrotoxic medications. Please continue to monitor renal function and recommend further evaluation with nephrology as an outpatient.

## 2018-08-27 NOTE — DISCHARGE NOTE ADULT - SECONDARY DIAGNOSIS.
Acute on chronic diastolic heart failure Mitral valve replaced CAD (coronary artery disease) Diabetes mellitus, type 2 Hyponatremia Chronic pain

## 2018-09-24 ENCOUNTER — APPOINTMENT (OUTPATIENT)
Dept: HEMATOLOGY ONCOLOGY | Facility: CLINIC | Age: 83
End: 2018-09-24

## 2018-10-11 ENCOUNTER — INPATIENT (INPATIENT)
Facility: HOSPITAL | Age: 83
LOS: 3 days | Discharge: HOME | DRG: 189 | End: 2018-10-15
Attending: HOSPITALIST | Admitting: INTERNAL MEDICINE
Payer: MEDICARE

## 2018-10-11 VITALS
SYSTOLIC BLOOD PRESSURE: 95 MMHG | OXYGEN SATURATION: 85 % | HEART RATE: 83 BPM | TEMPERATURE: 99 F | RESPIRATION RATE: 20 BRPM | DIASTOLIC BLOOD PRESSURE: 57 MMHG

## 2018-10-11 DIAGNOSIS — J96.91 RESPIRATORY FAILURE, UNSPECIFIED WITH HYPOXIA: ICD-10-CM

## 2018-10-11 DIAGNOSIS — Z98.890 OTHER SPECIFIED POSTPROCEDURAL STATES: Chronic | ICD-10-CM

## 2018-10-11 DIAGNOSIS — I50.33 ACUTE ON CHRONIC DIASTOLIC (CONGESTIVE) HEART FAILURE: ICD-10-CM

## 2018-10-11 DIAGNOSIS — E46 UNSPECIFIED PROTEIN-CALORIE MALNUTRITION: ICD-10-CM

## 2018-10-11 DIAGNOSIS — J96.01 ACUTE RESPIRATORY FAILURE WITH HYPOXIA: ICD-10-CM

## 2018-10-11 DIAGNOSIS — Z95.1 PRESENCE OF AORTOCORONARY BYPASS GRAFT: Chronic | ICD-10-CM

## 2018-10-11 DIAGNOSIS — E11.9 TYPE 2 DIABETES MELLITUS WITHOUT COMPLICATIONS: ICD-10-CM

## 2018-10-11 DIAGNOSIS — N17.9 ACUTE KIDNEY FAILURE, UNSPECIFIED: ICD-10-CM

## 2018-10-11 DIAGNOSIS — Z95.2 PRESENCE OF PROSTHETIC HEART VALVE: ICD-10-CM

## 2018-10-11 LAB
ALBUMIN SERPL ELPH-MCNC: 2.7 G/DL — LOW (ref 3.3–5.2)
ALP SERPL-CCNC: 117 U/L — SIGNIFICANT CHANGE UP (ref 40–120)
ALT FLD-CCNC: 54 U/L — HIGH
ANION GAP SERPL CALC-SCNC: 14 MMOL/L — SIGNIFICANT CHANGE UP (ref 5–17)
APTT BLD: 37.7 SEC — HIGH (ref 27.5–37.4)
AST SERPL-CCNC: 79 U/L — HIGH
BASE EXCESS BLDA CALC-SCNC: -1.4 MMOL/L — SIGNIFICANT CHANGE UP (ref -2–2)
BASOPHILS # BLD AUTO: 0 K/UL — SIGNIFICANT CHANGE UP (ref 0–0.2)
BASOPHILS NFR BLD AUTO: 0.1 % — SIGNIFICANT CHANGE UP (ref 0–2)
BILIRUB SERPL-MCNC: 2.1 MG/DL — HIGH (ref 0.4–2)
BLD GP AB SCN SERPL QL: SIGNIFICANT CHANGE UP
BLOOD GAS COMMENTS ARTERIAL: SIGNIFICANT CHANGE UP
BUN SERPL-MCNC: 41 MG/DL — HIGH (ref 8–20)
CALCIUM SERPL-MCNC: 8.2 MG/DL — LOW (ref 8.6–10.2)
CHLORIDE SERPL-SCNC: 100 MMOL/L — SIGNIFICANT CHANGE UP (ref 98–107)
CK SERPL-CCNC: 32 U/L — SIGNIFICANT CHANGE UP (ref 30–200)
CO2 SERPL-SCNC: 22 MMOL/L — SIGNIFICANT CHANGE UP (ref 22–29)
CREAT SERPL-MCNC: 1.57 MG/DL — HIGH (ref 0.5–1.3)
EOSINOPHIL # BLD AUTO: 0 K/UL — SIGNIFICANT CHANGE UP (ref 0–0.5)
EOSINOPHIL NFR BLD AUTO: 0 % — SIGNIFICANT CHANGE UP (ref 0–5)
GAS PNL BLDA: SIGNIFICANT CHANGE UP
GLUCOSE BLDC GLUCOMTR-MCNC: 163 MG/DL — HIGH (ref 70–99)
GLUCOSE BLDC GLUCOMTR-MCNC: 167 MG/DL — HIGH (ref 70–99)
GLUCOSE SERPL-MCNC: 171 MG/DL — HIGH (ref 70–115)
HCO3 BLDA-SCNC: 23 MMOL/L — SIGNIFICANT CHANGE UP (ref 20–26)
HCT VFR BLD CALC: 34.5 % — LOW (ref 42–52)
HGB BLD-MCNC: 10.8 G/DL — LOW (ref 14–18)
HOROWITZ INDEX BLDA+IHG-RTO: SIGNIFICANT CHANGE UP
INR BLD: 3.57 RATIO — HIGH (ref 0.88–1.16)
LACTATE BLDV-MCNC: 2.8 MMOL/L — HIGH (ref 0.5–2)
LYMPHOCYTES # BLD AUTO: 1.5 K/UL — SIGNIFICANT CHANGE UP (ref 1–4.8)
LYMPHOCYTES # BLD AUTO: 13.4 % — LOW (ref 20–55)
MCHC RBC-ENTMCNC: 27.4 PG — SIGNIFICANT CHANGE UP (ref 27–31)
MCHC RBC-ENTMCNC: 31.3 G/DL — LOW (ref 32–36)
MCV RBC AUTO: 87.6 FL — SIGNIFICANT CHANGE UP (ref 80–94)
MONOCYTES # BLD AUTO: 1 K/UL — HIGH (ref 0–0.8)
MONOCYTES NFR BLD AUTO: 8.7 % — SIGNIFICANT CHANGE UP (ref 3–10)
NEUTROPHILS # BLD AUTO: 8.7 K/UL — HIGH (ref 1.8–8)
NEUTROPHILS NFR BLD AUTO: 77.6 % — HIGH (ref 37–73)
NT-PROBNP SERPL-SCNC: HIGH PG/ML (ref 0–300)
PCO2 BLDA: 26 MMHG — LOW (ref 35–45)
PH BLDA: 7.51 — HIGH (ref 7.35–7.45)
PLATELET # BLD AUTO: 156 K/UL — SIGNIFICANT CHANGE UP (ref 150–400)
PO2 BLDA: 56 MMHG — LOW (ref 83–108)
POTASSIUM SERPL-MCNC: 5.1 MMOL/L — SIGNIFICANT CHANGE UP (ref 3.5–5.3)
POTASSIUM SERPL-SCNC: 5.1 MMOL/L — SIGNIFICANT CHANGE UP (ref 3.5–5.3)
PROT SERPL-MCNC: 6.9 G/DL — SIGNIFICANT CHANGE UP (ref 6.6–8.7)
PROTHROM AB SERPL-ACNC: 40.3 SEC — HIGH (ref 9.8–12.7)
RBC # BLD: 3.94 M/UL — LOW (ref 4.6–6.2)
RBC # FLD: 18.4 % — HIGH (ref 11–15.6)
SAO2 % BLDA: 90 % — LOW (ref 95–99)
SODIUM SERPL-SCNC: 136 MMOL/L — SIGNIFICANT CHANGE UP (ref 135–145)
TROPONIN T SERPL-MCNC: 0.05 NG/ML — SIGNIFICANT CHANGE UP (ref 0–0.06)
TYPE + AB SCN PNL BLD: SIGNIFICANT CHANGE UP
WBC # BLD: 11.2 K/UL — HIGH (ref 4.8–10.8)
WBC # FLD AUTO: 11.2 K/UL — HIGH (ref 4.8–10.8)

## 2018-10-11 PROCEDURE — 71045 X-RAY EXAM CHEST 1 VIEW: CPT | Mod: 26

## 2018-10-11 PROCEDURE — 93010 ELECTROCARDIOGRAM REPORT: CPT

## 2018-10-11 PROCEDURE — 99291 CRITICAL CARE FIRST HOUR: CPT

## 2018-10-11 PROCEDURE — 71250 CT THORAX DX C-: CPT | Mod: 26

## 2018-10-11 RX ORDER — WARFARIN SODIUM 2.5 MG/1
1 TABLET ORAL
Qty: 0 | Refills: 0 | COMMUNITY

## 2018-10-11 RX ORDER — DEXTROSE 50 % IN WATER 50 %
12.5 SYRINGE (ML) INTRAVENOUS ONCE
Qty: 0 | Refills: 0 | Status: DISCONTINUED | OUTPATIENT
Start: 2018-10-11 | End: 2018-10-15

## 2018-10-11 RX ORDER — SODIUM CHLORIDE 9 MG/ML
1 INJECTION INTRAMUSCULAR; INTRAVENOUS; SUBCUTANEOUS
Qty: 0 | Refills: 0 | COMMUNITY

## 2018-10-11 RX ORDER — IPRATROPIUM/ALBUTEROL SULFATE 18-103MCG
3 AEROSOL WITH ADAPTER (GRAM) INHALATION EVERY 6 HOURS
Qty: 0 | Refills: 0 | Status: DISCONTINUED | OUTPATIENT
Start: 2018-10-11 | End: 2018-10-15

## 2018-10-11 RX ORDER — FUROSEMIDE 40 MG
20 TABLET ORAL ONCE
Qty: 0 | Refills: 0 | Status: DISCONTINUED | OUTPATIENT
Start: 2018-10-11 | End: 2018-10-11

## 2018-10-11 RX ORDER — ALLOPURINOL 300 MG
150 TABLET ORAL
Qty: 0 | Refills: 0 | COMMUNITY

## 2018-10-11 RX ORDER — DEXTROSE 50 % IN WATER 50 %
15 SYRINGE (ML) INTRAVENOUS ONCE
Qty: 0 | Refills: 0 | Status: DISCONTINUED | OUTPATIENT
Start: 2018-10-11 | End: 2018-10-15

## 2018-10-11 RX ORDER — DEXTROSE 50 % IN WATER 50 %
25 SYRINGE (ML) INTRAVENOUS ONCE
Qty: 0 | Refills: 0 | Status: DISCONTINUED | OUTPATIENT
Start: 2018-10-11 | End: 2018-10-15

## 2018-10-11 RX ORDER — MIRTAZAPINE 45 MG/1
7.5 TABLET, ORALLY DISINTEGRATING ORAL AT BEDTIME
Qty: 0 | Refills: 0 | Status: DISCONTINUED | OUTPATIENT
Start: 2018-10-11 | End: 2018-10-15

## 2018-10-11 RX ORDER — SODIUM CHLORIDE 9 MG/ML
1000 INJECTION, SOLUTION INTRAVENOUS
Qty: 0 | Refills: 0 | Status: DISCONTINUED | OUTPATIENT
Start: 2018-10-11 | End: 2018-10-15

## 2018-10-11 RX ORDER — GLUCAGON INJECTION, SOLUTION 0.5 MG/.1ML
1 INJECTION, SOLUTION SUBCUTANEOUS ONCE
Qty: 0 | Refills: 0 | Status: DISCONTINUED | OUTPATIENT
Start: 2018-10-11 | End: 2018-10-15

## 2018-10-11 RX ORDER — FERROUS SULFATE 325(65) MG
1 TABLET ORAL
Qty: 0 | Refills: 0 | COMMUNITY

## 2018-10-11 RX ORDER — INSULIN LISPRO 100/ML
VIAL (ML) SUBCUTANEOUS
Qty: 0 | Refills: 0 | Status: DISCONTINUED | OUTPATIENT
Start: 2018-10-11 | End: 2018-10-15

## 2018-10-11 RX ORDER — IPRATROPIUM/ALBUTEROL SULFATE 18-103MCG
3 AEROSOL WITH ADAPTER (GRAM) INHALATION
Qty: 0 | Refills: 0 | COMMUNITY

## 2018-10-11 RX ORDER — ALBUTEROL 90 UG/1
1 AEROSOL, METERED ORAL EVERY 4 HOURS
Qty: 0 | Refills: 0 | Status: DISCONTINUED | OUTPATIENT
Start: 2018-10-11 | End: 2018-10-15

## 2018-10-11 RX ORDER — PANTOPRAZOLE SODIUM 20 MG/1
40 TABLET, DELAYED RELEASE ORAL
Qty: 0 | Refills: 0 | Status: DISCONTINUED | OUTPATIENT
Start: 2018-10-11 | End: 2018-10-15

## 2018-10-11 RX ORDER — TIOTROPIUM BROMIDE 18 UG/1
1 CAPSULE ORAL; RESPIRATORY (INHALATION) DAILY
Qty: 0 | Refills: 0 | Status: DISCONTINUED | OUTPATIENT
Start: 2018-10-11 | End: 2018-10-15

## 2018-10-11 RX ORDER — FUROSEMIDE 40 MG
40 TABLET ORAL ONCE
Qty: 0 | Refills: 0 | Status: COMPLETED | OUTPATIENT
Start: 2018-10-11 | End: 2018-10-11

## 2018-10-11 RX ORDER — FUROSEMIDE 40 MG
40 TABLET ORAL EVERY 12 HOURS
Qty: 0 | Refills: 0 | Status: DISCONTINUED | OUTPATIENT
Start: 2018-10-11 | End: 2018-10-13

## 2018-10-11 RX ORDER — SODIUM CHLORIDE 9 MG/ML
3 INJECTION INTRAMUSCULAR; INTRAVENOUS; SUBCUTANEOUS ONCE
Qty: 0 | Refills: 0 | Status: COMPLETED | OUTPATIENT
Start: 2018-10-11 | End: 2018-10-11

## 2018-10-11 RX ORDER — ATORVASTATIN CALCIUM 80 MG/1
40 TABLET, FILM COATED ORAL AT BEDTIME
Qty: 0 | Refills: 0 | Status: DISCONTINUED | OUTPATIENT
Start: 2018-10-11 | End: 2018-10-15

## 2018-10-11 RX ORDER — INSULIN LISPRO 100/ML
0 VIAL (ML) SUBCUTANEOUS
Qty: 0 | Refills: 0 | COMMUNITY

## 2018-10-11 RX ADMIN — Medication 2: at 18:01

## 2018-10-11 RX ADMIN — MIRTAZAPINE 7.5 MILLIGRAM(S): 45 TABLET, ORALLY DISINTEGRATING ORAL at 22:09

## 2018-10-11 RX ADMIN — SODIUM CHLORIDE 3 MILLILITER(S): 9 INJECTION INTRAMUSCULAR; INTRAVENOUS; SUBCUTANEOUS at 12:24

## 2018-10-11 RX ADMIN — Medication 3 MILLILITER(S): at 20:42

## 2018-10-11 RX ADMIN — Medication 40 MILLIGRAM(S): at 13:39

## 2018-10-11 RX ADMIN — ATORVASTATIN CALCIUM 40 MILLIGRAM(S): 80 TABLET, FILM COATED ORAL at 22:09

## 2018-10-11 RX ADMIN — Medication 75 MILLIGRAM(S): at 18:01

## 2018-10-11 NOTE — ED PROVIDER NOTE - CARE PLAN
Principal Discharge DX:	Acute respiratory failure with hypoxemia  Secondary Diagnosis:	Pulmonary edema cardiac cause

## 2018-10-11 NOTE — ED PROVIDER NOTE - OBJECTIVE STATEMENT
82 yo male hx of diastolic chf, mechanical mitral valve, afib, on coumadin and on chronic home O2 at approx 2-3L/min; hx of recent admission for GI bleed, required transfusions; resumed coumadin; presents for several days of increasing fatigue; has 82 yo male hx of diastolic chf, mechanical mitral valve, afib, on coumadin and on chronic home O2 at approx 2-3L/min; hx of recent admission for GI bleed, required transfusions; resumed coumadin; presents for several days of increasing fatigue; has been at [facility] rehab, returned home 2 weeks ago; symptoms have been progressive; denies fever; +questionable dysuria, no chest pain; wife states over last few days has been increasing SOB and JOE; pulseox checks at home with sats 70s -80s; no relief despite increasing O2; salt tabs started at rehab

## 2018-10-11 NOTE — ED ADULT NURSE NOTE - OBJECTIVE STATEMENT
patietnt c/o shortness of breath for 2-3 days, has been weaker and JOE more often, patient with hx of CHF, with pacemaker,

## 2018-10-11 NOTE — ED PROVIDER NOTE - CRITICAL CARE PROVIDED
additional history taking/direct patient care (not related to procedure)/interpretation of diagnostic studies/consultation with other physicians/consult w/ pt's family directly relating to pts condition/documentation

## 2018-10-11 NOTE — CONSULT NOTE ADULT - ASSESSMENT
Impression.  Acute on chronic diastolic congestive heart failure with component of COPD. S/P CABG 20 yrs ago, Mechanical MVR in 2012.    Hx of HTN, HL, DM, SSS S/P PPM.  On AC. Continue Coumadin.  On NIPPV with improvement of symptoms.  Will need aggressively diuresis with IV Lasix.  f/u Echocardiogram.  Acute renal failure with probable cardio-renal syndrome.  Will likely benefit from renal evaluation.  SBE prophylaxis for all procedures associated with bacteremia.

## 2018-10-11 NOTE — ED ADULT NURSE REASSESSMENT NOTE - NS ED NURSE REASSESS COMMENT FT1
Patient transported to MICU report given to TAMRA Cho. Patient A/Ox3, Vital signs stable, Pt came in for SOB, currently on BiPAP.  Respirations are even and unlabored, crackles auscultated on bilateral lobes. PT belongings returned to family.

## 2018-10-11 NOTE — H&P ADULT - PROBLEM SELECTOR PLAN 1
Related to chronic Diastolic disease, likely exacerbated by recent Salt Tabs without any underlying diuretics. BNP is 34K now and last month it was 4K. There is overall concern for his general decline as he appears in a state of cardiac cachexia.  Will support with NIPPV to optimize oxygentation and Diurese aggressively with IV Lasix.  Check Echo  Cardiology F/U with [facility]

## 2018-10-11 NOTE — H&P ADULT - ASSESSMENT
83M with failure to thrive and worsening dyspnea and hypoxia at rest. Found to be in decompensated CHF with general failure to thrive and acute renal failure with a Creatnine of 1.57.      A total of 40 mins was spent evaluating and treating this critical patient including speaking with his wife at the bedside

## 2018-10-11 NOTE — PATIENT PROFILE ADULT - NSPRONUTRITIONRISK_GEN_A_NUR
Significant decrease of oral intake greater than 5 days prior to admission/Unintentional weight loss greater than 10 percent

## 2018-10-11 NOTE — H&P ADULT - PROBLEM SELECTOR PLAN 2
Related acutely to CHF on top of his underlying COPD.  Will support with NIPPV as stated above.   Resume Duoneb  treatment  Wean FIO2 on CPAP for SAO2 > 90 and then transition to HF NC as needed.

## 2018-10-11 NOTE — H&P ADULT - PMH
CHF (congestive heart failure)    Chronic pain    COPD (chronic obstructive pulmonary disease)    Diabetes    Insomnia    Mitral valve replaced    Pacemaker

## 2018-10-11 NOTE — CONSULT NOTE ADULT - SUBJECTIVE AND OBJECTIVE BOX
SOB    83M with hx of CHF, diastolic, DM, HTN, anemia, COPD on home O2 therapy.  Extensive cardiac hx with CABG 20 yrs ago.  Mechanical MVR IN .  S/P PPM implant.  Recent hospitalization 2/2 CHF and hypoxia.  Pt presents with confusion and dyspnea.  Feeling better on HF NC O2.  Denies CP.    Allergies.     No Known Allergies    MEDICATIONS  (STANDING):  ALBUTerol    90 MICROgram(s) HFA Inhaler 1 Puff(s) Inhalation every 4 hours  ALBUTerol/ipratropium for Nebulization 3 milliLiter(s) Nebulizer every 6 hours  atorvastatin 40 milliGRAM(s) Oral at bedtime  dextrose 5%. 1000 milliLiter(s) (50 mL/Hr) IV Continuous <Continuous>  dextrose 50% Injectable 12.5 Gram(s) IV Push once  dextrose 50% Injectable 25 Gram(s) IV Push once  dextrose 50% Injectable 25 Gram(s) IV Push once  furosemide   Injectable 40 milliGRAM(s) IV Push every 12 hours  insulin lispro (HumaLOG) corrective regimen sliding scale   SubCutaneous three times a day before meals  mirtazapine 7.5 milliGRAM(s) Oral at bedtime  pantoprazole    Tablet 40 milliGRAM(s) Oral before breakfast  pregabalin 75 milliGRAM(s) Oral two times a day  tiotropium 18 MICROgram(s) Capsule 1 Capsule(s) Inhalation daily    MEDICATIONS  (PRN):  dextrose 40% Gel 15 Gram(s) Oral once PRN Blood Glucose LESS THAN 70 milliGRAM(s)/deciLiter  glucagon  Injectable 1 milliGRAM(s) IntraMuscular once PRN Glucose <70 milliGRAM(s)/deciLiter      FAMILY HISTORY:  No pertinent family history in first degree relatives      SOCIAL HISTORY:    CIGARETTES:  former smoker      REVIEW OF SYSTEMS:  CONSTITUTIONAL: No fever, weight loss, or fatigue  NECK: No pain or stiffness  CARDIOVASCULAR: No chest pain, palpitations, passing out, dizziness, or leg swelling  NEUROLOGICAL: No headaches, loss of strength, numbness, or tremors      Vital Signs Last 24 Hrs  T(C): 36.9 (11 Oct 2018 20:00), Max: 37 (11 Oct 2018 11:17)  T(F): 98.4 (11 Oct 2018 20:00), Max: 98.6 (11 Oct 2018 11:17)  HR: 60 (11 Oct 2018 21:00) (60 - 83)  BP: 87/52 (11 Oct 2018 21:00) (87/52 - 116/71)  BP(mean): 63 (11 Oct 2018 21:00) (63 - 87)  RR: 29 (11 Oct 2018 21:00) (17 - 39)  SpO2: 93% (11 Oct 2018 21:00) (85% - 100%)    Daily Height in cm: 180.34 (11 Oct 2018 16:10)    Daily Weight in k.5 (11 Oct 2018 16:10)    I&O's Detail    11 Oct 2018 07:01  -  11 Oct 2018 22:53  --------------------------------------------------------  IN:    Oral Fluid: 240 mL  Total IN: 240 mL    OUT:    Incontinent per Condom Catheter: 125 mL    Voided: 1 mL  Total OUT: 126 mL    Total NET: 114 mL          PHYSICAL EXAM:  Appearance: NAD		  Cardiovascular: Normal S1 S2, No JVD, 2/6 systolic murmur, No carotid bruits  Respiratory: Lungs clear to auscultation	  Gastrointestinal:  Soft, Non-tender, + BS, no bruits	  Skin: No rashes, No ecchymoses, No cyanosis  Neurologic: Grossly non-focal with full strength in all four extremities  Extremities: Normal range of motion, No clubbing, cyanosis or edema  Vascular: Peripheral pulses palpable 2+ bilaterally      INTERPRETATION OF TELEMETRY:    ECG: SR no acute st/t abn    LABS:                        10.8   11.2  )-----------( 156      ( 11 Oct 2018 12:24 )             34.5     10-11    136  |  100  |  41.0<H>  ----------------------------<  171<H>  5.1   |  22.0  |  1.57<H>    Ca    8.2<L>      11 Oct 2018 12:24    TPro  6.9  /  Alb  2.7<L>  /  TBili  2.1<H>  /  DBili  x   /  AST  79<H>  /  ALT  54<H>  /  AlkPhos  117  10-11    CARDIAC MARKERS ( 11 Oct 2018 12:24 )  x     / 0.05 ng/mL / 32 U/L / x     / x          PT/INR - ( 11 Oct 2018 12:24 )   PT: 40.3 sec;   INR: 3.57 ratio         PTT - ( 11 Oct 2018 12:24 )  PTT:37.7 sec    I&O's Summary    11 Oct 2018 07:01  -  11 Oct 2018 22:53  --------------------------------------------------------  IN: 240 mL / OUT: 126 mL / NET: 114 mL      BNPSerum Pro-Brain Natriuretic Peptide: 71319 pg/mL (10-11 @ 12:24)    RADIOLOGY & ADDITIONAL STUDIES:

## 2018-10-11 NOTE — ED ADULT NURSE NOTE - NSIMPLEMENTINTERV_GEN_ALL_ED
Implemented All Fall with Harm Risk Interventions:  [place] to call system. Call bell, personal items and telephone within reach. Instruct patient to call for assistance. Room bathroom lighting operational. Non-slip footwear when patient is off stretcher. Physically safe environment: no spills, clutter or unnecessary equipment. Stretcher in lowest position, wheels locked, appropriate side rails in place. Provide visual cue, wrist band, yellow gown, etc. Monitor gait and stability. Monitor for mental status changes and reorient to person, place, and time. Review medications for side effects contributing to fall risk. Reinforce activity limits and safety measures with patient and family. Provide visual clues: red socks.

## 2018-10-11 NOTE — H&P ADULT - HISTORY OF PRESENT ILLNESS
83M with a PMHx of Diastolic CHF, DM, HTN, Chronic Anemia, COPD on Home O2, S/P MVR on Coumadin.  Recent hospitalization for CHF and hypoxia. Now presents for increasing weakness and lethargy. recently DC from [facility]. His wife describes a state in over all decline, with poor PO intake/Appetite. He denies Chest pain, No nausea/vomiting, fevers, chills, cough  or diarrhea.   Found to have O2 sats in the 70s upon arrival with mild confusion and dyspnea. He was placed on HF NC OF with improvement in O2 sat to the low 90s.     Of NOte he was recently placed on Salt tablets at the Rehab center without diuretics

## 2018-10-12 DIAGNOSIS — I48.2 CHRONIC ATRIAL FIBRILLATION: ICD-10-CM

## 2018-10-12 DIAGNOSIS — E11.22 TYPE 2 DIABETES MELLITUS WITH DIABETIC CHRONIC KIDNEY DISEASE: ICD-10-CM

## 2018-10-12 DIAGNOSIS — Z29.9 ENCOUNTER FOR PROPHYLACTIC MEASURES, UNSPECIFIED: ICD-10-CM

## 2018-10-12 DIAGNOSIS — N17.9 ACUTE KIDNEY FAILURE, UNSPECIFIED: ICD-10-CM

## 2018-10-12 DIAGNOSIS — J96.21 ACUTE AND CHRONIC RESPIRATORY FAILURE WITH HYPOXIA: ICD-10-CM

## 2018-10-12 DIAGNOSIS — D64.9 ANEMIA, UNSPECIFIED: ICD-10-CM

## 2018-10-12 DIAGNOSIS — I25.10 ATHEROSCLEROTIC HEART DISEASE OF NATIVE CORONARY ARTERY WITHOUT ANGINA PECTORIS: ICD-10-CM

## 2018-10-12 DIAGNOSIS — I10 ESSENTIAL (PRIMARY) HYPERTENSION: ICD-10-CM

## 2018-10-12 DIAGNOSIS — I50.33 ACUTE ON CHRONIC DIASTOLIC (CONGESTIVE) HEART FAILURE: ICD-10-CM

## 2018-10-12 DIAGNOSIS — E46 UNSPECIFIED PROTEIN-CALORIE MALNUTRITION: ICD-10-CM

## 2018-10-12 LAB
ANION GAP SERPL CALC-SCNC: 15 MMOL/L — SIGNIFICANT CHANGE UP (ref 5–17)
APTT BLD: 43.8 SEC — HIGH (ref 27.5–37.4)
BUN SERPL-MCNC: 46 MG/DL — HIGH (ref 8–20)
CALCIUM SERPL-MCNC: 8.3 MG/DL — LOW (ref 8.6–10.2)
CHLORIDE SERPL-SCNC: 101 MMOL/L — SIGNIFICANT CHANGE UP (ref 98–107)
CO2 SERPL-SCNC: 20 MMOL/L — LOW (ref 22–29)
CREAT SERPL-MCNC: 1.63 MG/DL — HIGH (ref 0.5–1.3)
GLUCOSE BLDC GLUCOMTR-MCNC: 178 MG/DL — HIGH (ref 70–99)
GLUCOSE BLDC GLUCOMTR-MCNC: 183 MG/DL — HIGH (ref 70–99)
GLUCOSE BLDC GLUCOMTR-MCNC: 184 MG/DL — HIGH (ref 70–99)
GLUCOSE BLDC GLUCOMTR-MCNC: 186 MG/DL — HIGH (ref 70–99)
GLUCOSE SERPL-MCNC: 153 MG/DL — HIGH (ref 70–115)
HBA1C BLD-MCNC: 5.3 % — SIGNIFICANT CHANGE UP (ref 4–5.6)
HCT VFR BLD CALC: 34.9 % — LOW (ref 42–52)
HGB BLD-MCNC: 10.9 G/DL — LOW (ref 14–18)
INR BLD: 5.07 RATIO — CRITICAL HIGH (ref 0.88–1.16)
MAGNESIUM SERPL-MCNC: 2.3 MG/DL — SIGNIFICANT CHANGE UP (ref 1.8–2.6)
MCHC RBC-ENTMCNC: 27 PG — SIGNIFICANT CHANGE UP (ref 27–31)
MCHC RBC-ENTMCNC: 31.2 G/DL — LOW (ref 32–36)
MCV RBC AUTO: 86.4 FL — SIGNIFICANT CHANGE UP (ref 80–94)
PHOSPHATE SERPL-MCNC: 4.4 MG/DL — SIGNIFICANT CHANGE UP (ref 2.4–4.7)
PLATELET # BLD AUTO: 159 K/UL — SIGNIFICANT CHANGE UP (ref 150–400)
POTASSIUM SERPL-MCNC: 4.6 MMOL/L — SIGNIFICANT CHANGE UP (ref 3.5–5.3)
POTASSIUM SERPL-SCNC: 4.6 MMOL/L — SIGNIFICANT CHANGE UP (ref 3.5–5.3)
PROTHROM AB SERPL-ACNC: 57.6 SEC — HIGH (ref 9.8–12.7)
RBC # BLD: 4.04 M/UL — LOW (ref 4.6–6.2)
RBC # FLD: 18.4 % — HIGH (ref 11–15.6)
SODIUM SERPL-SCNC: 136 MMOL/L — SIGNIFICANT CHANGE UP (ref 135–145)
WBC # BLD: 10.7 K/UL — SIGNIFICANT CHANGE UP (ref 4.8–10.8)
WBC # FLD AUTO: 10.7 K/UL — SIGNIFICANT CHANGE UP (ref 4.8–10.8)

## 2018-10-12 PROCEDURE — 99233 SBSQ HOSP IP/OBS HIGH 50: CPT

## 2018-10-12 RX ORDER — SODIUM CHLORIDE 9 MG/ML
1000 INJECTION INTRAMUSCULAR; INTRAVENOUS; SUBCUTANEOUS ONCE
Qty: 0 | Refills: 0 | Status: COMPLETED | OUTPATIENT
Start: 2018-10-12 | End: 2018-10-12

## 2018-10-12 RX ORDER — BUDESONIDE, MICRONIZED 100 %
0.5 POWDER (GRAM) MISCELLANEOUS
Qty: 0 | Refills: 0 | Status: DISCONTINUED | OUTPATIENT
Start: 2018-10-12 | End: 2018-10-15

## 2018-10-12 RX ORDER — MULTIVIT-MIN/FERROUS GLUCONATE 9 MG/15 ML
1 LIQUID (ML) ORAL DAILY
Qty: 0 | Refills: 0 | Status: DISCONTINUED | OUTPATIENT
Start: 2018-10-12 | End: 2018-10-15

## 2018-10-12 RX ADMIN — Medication 3 MILLILITER(S): at 15:43

## 2018-10-12 RX ADMIN — Medication 40 MILLIGRAM(S): at 18:09

## 2018-10-12 RX ADMIN — Medication 40 MILLIGRAM(S): at 05:19

## 2018-10-12 RX ADMIN — Medication 75 MILLIGRAM(S): at 18:09

## 2018-10-12 RX ADMIN — Medication 75 MILLIGRAM(S): at 05:19

## 2018-10-12 RX ADMIN — Medication 0.5 MILLIGRAM(S): at 20:30

## 2018-10-12 RX ADMIN — Medication 2: at 12:04

## 2018-10-12 RX ADMIN — Medication 2: at 07:58

## 2018-10-12 RX ADMIN — Medication 2: at 17:50

## 2018-10-12 RX ADMIN — PANTOPRAZOLE SODIUM 40 MILLIGRAM(S): 20 TABLET, DELAYED RELEASE ORAL at 06:43

## 2018-10-12 RX ADMIN — Medication 100 MILLIGRAM(S): at 19:44

## 2018-10-12 RX ADMIN — SODIUM CHLORIDE 500 MILLILITER(S): 9 INJECTION INTRAMUSCULAR; INTRAVENOUS; SUBCUTANEOUS at 01:00

## 2018-10-12 RX ADMIN — Medication 3 MILLILITER(S): at 03:23

## 2018-10-12 RX ADMIN — MIRTAZAPINE 7.5 MILLIGRAM(S): 45 TABLET, ORALLY DISINTEGRATING ORAL at 22:47

## 2018-10-12 RX ADMIN — Medication 3 MILLILITER(S): at 09:05

## 2018-10-12 RX ADMIN — ATORVASTATIN CALCIUM 40 MILLIGRAM(S): 80 TABLET, FILM COATED ORAL at 22:47

## 2018-10-12 RX ADMIN — Medication 3 MILLILITER(S): at 20:30

## 2018-10-12 NOTE — PROGRESS NOTE ADULT - ASSESSMENT
83 year old male with PMH HTN, T2DM, COPD on home O2, CAD s/p CABG, chronic AF, s/p PPM s/p MVR on Coumadin, Chronic anemia, recent admission for GIB likely CKD3 presented with progressive dyspnea and fatigue and admitted for acute on chronic hypoxic respiratory failure secondary to acutely decompensated CHFpEF secondary to salt tablets in Nursing Home.  Mild leukocytosis, anemia and elevated SCr at admission BNP 56086, Tn 0.05, Chest X-Ray with congestive changes and EKG with AF.    Patient required NIPPV in ER and admitted to MICU, improved overnight with IV Lasix - now no longer on NIPPV. CT scan chest with pleural effusion and edema. Downgraded to medical service for further management.    Currently stable on O2 nasal cannula, major irritant is cough.  INR supratherapeutic

## 2018-10-12 NOTE — CHART NOTE - NSCHARTNOTEFT_GEN_A_CORE
Upon Nutritional Assessment by the Registered Dietitian your patient was determined to meet criteria / has evidence of the following diagnosis/diagnoses:          [ ]  Mild Protein Calorie Malnutrition        [ ]  Moderate Protein Calorie Malnutrition        [x] Severe Protein Calorie Malnutrition        [ ] Unspecified Protein Calorie Malnutrition        [ ] Underweight / BMI <19        [ ] Morbid Obesity / BMI > 40    Pt presents with malnutrition (severe, chronic) related to inability to consume sufficient protein-energy associated with COPD, CHF, and overall decreased appetite as evidenced by <75% energy intake >1 month, 14.4% wt loss x <2 months, severe muscle loss of temples, clavicles, shoulders, and severe fat loss of orbitals, triceps, and buccal pads noted on NFPE.     Findings as based on:  •  Comprehensive nutrition assessment and consultation  •  Calorie counts (nutrient intake analysis)  •  Food acceptance and intake status from observations by staff  •  Follow up  •  Patient education  •  Intervention secondary to interdisciplinary rounds  •   concerns      Treatment:    The following diet has been recommended: Continue current diet. Recommend Glucerna TID to optimize po intake and provide an additional 660 kcal, 30g protein. Recommend MVI and vitamin C 500mg daily      PROVIDER Section:     By signing this assessment you are acknowledging and agree with the diagnosis/diagnoses assigned by the Registered Dietitian    Comments:

## 2018-10-12 NOTE — DIETITIAN INITIAL EVALUATION ADULT. - PERTINENT LABORATORY DATA
10-12 Na136 mmol/L Glu 153 mg/dL<H> K+ 4.6 mmol/L Cr  1.63 mg/dL<H> BUN 46.0 mg/dL<H> Phos 4.4 mg/dL Alb n/a   PAB n/a

## 2018-10-12 NOTE — DIETITIAN INITIAL EVALUATION ADULT. - OTHER INFO
83M with a PMHx of Diastolic CHF, DM, HTN, Chronic Anemia, COPD on Home O2, S/P MVR. Spoke with pt and son, reports poor appetite/po intake for a prolonged time. Son states pt had lost a significant amount of weight will in ANT. Per EMR review, pt with a 25 lb wt loss x <2 months. Denies chewing/swallowing difficulty. Noted with stage I pressure injury to sacrum.

## 2018-10-12 NOTE — DIETITIAN INITIAL EVALUATION ADULT. - PROBLEM SELECTOR PLAN 1
Related to chronic Diastolic disease, likely exacerbated by recent Salt Tabs without any underlying diuretics. BNP is 34K now and last month it was 4K. There is overall concern for his general decline as he appears in a state of cardiac cachexia.  Will support with NIPPV to optimize oxygentation and Diurese aggressively with IV Lasix.  Check Echo  Cardiology F/U with [facility]

## 2018-10-12 NOTE — DIETITIAN INITIAL EVALUATION ADULT. - PROBLEM SELECTOR PLAN 3
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 Increased Concerns given the need fr diuresis at this time, which makes worsening renal function more of a possibility.   Will avoid other nephrotoxic agents

## 2018-10-12 NOTE — PROGRESS NOTE ADULT - ATTENDING COMMENTS
Hold Coumadin for now - target INR 2,5-3,5 for mechanical valve documented\  Add antitussive for cough, ICS for COPD

## 2018-10-12 NOTE — DIETITIAN INITIAL EVALUATION ADULT. - ETIOLOGY
related to inability to consume sufficient protein-energy associated with COPD, CHF, and overall decreased appetite

## 2018-10-12 NOTE — DIETITIAN INITIAL EVALUATION ADULT. - PHYSICAL APPEARANCE
emaciated/BMI 20.8(WNL)- NFPE performed, physical findings include severe muscle loss of temples, clavicles, shoulders, and severe fat loss of orbitals, triceps, and buccal pads

## 2018-10-12 NOTE — PROGRESS NOTE ADULT - SUBJECTIVE AND OBJECTIVE BOX
HOSPITALIST PROGRESS NOTE    DAVID LUIS  [id number]  83yMale    Patient is a 83y old  Male who presents with a chief complaint of confusion. SOB (12 Oct 2018 09:11)      SUBJECTIVE:   Chart reviewed since admission, as well as recent visits  Patient seen and examined at bedside for resp failure, CHF  Feels better, complaining of cough  Denies any dyspnea, chest pain, palpitations, chills.  Post tussive emesis      OBJECTIVE:  Vital Signs Last 24 Hrs  T(C): 36.9 (12 Oct 2018 15:15), Max: 36.9 (11 Oct 2018 20:00)  T(F): 98.5 (12 Oct 2018 15:15), Max: 98.5 (12 Oct 2018 15:15)  HR: 100 (12 Oct 2018 15:43) (60 - 100)  BP: 95/60 (12 Oct 2018 15:15) (87/52 - 104/52)  BP(mean): 68 (12 Oct 2018 10:00) (62 - 77)  RR: 18 (12 Oct 2018 15:15) (17 - 39)  SpO2: 95% (12 Oct 2018 15:15) (90% - 100%)    PHYSICAL EXAMINATION  General: distressed due to intermittent dry cough  HEENT: AT/NC[]  PERRLA[]  EOMI[+]   Moist oral mucosa[]  Pharyngeal exudates[]  NECK: Supple[+]  JVD[-] Carotid bruit[]  CVS: RRR[]  Irregular[+]  S1+S2[+]  PPM[+]  RESP: Fair air entry bilaterally[+] except bases with decreased sounds and bibasilar crackles  No wheeze  GI: Soft[+]  Nondistended[]   Nontender[+]   Bowel Sounds[+]  Mass[]   HSM[]   Ascites[]  : suprapubic tenderness[-]   CVA Tenderness[]   Felix[] [place] catheter  MS: FROM[+]   Edema[+]grossly intact   INTEG: Skin is Warm[]  dry[] Lesion[] Decubitus[]  PSYCH: Fair Mood, Affect - fatigued    MONITOR: AF  CAPILLARY BLOOD GLUCOSE      POCT Blood Glucose.: 184 mg/dL (12 Oct 2018 11:59)  POCT Blood Glucose.: 178 mg/dL (12 Oct 2018 07:56)  POCT Blood Glucose.: 167 mg/dL (11 Oct 2018 23:52)  POCT Blood Glucose.: 163 mg/dL (11 Oct 2018 17:50)        I&O's Summary    11 Oct 2018 07:01  -  12 Oct 2018 07:00  --------------------------------------------------------  IN: 840 mL / OUT: 301 mL / NET: 539 mL    12 Oct 2018 07:01  -  12 Oct 2018 17:24  --------------------------------------------------------  IN: 240 mL / OUT: 350 mL / NET: -110 mL                            10.9   10.7  )-----------( 159      ( 12 Oct 2018 05:51 )             34.9     PT/INR - ( 12 Oct 2018 13:00 )   PT: 57.6 sec;   INR: 5.07 ratio         PTT - ( 12 Oct 2018 13:00 )  PTT:43.8 sec  10-12    136  |  101  |  46.0<H>  ----------------------------<  153<H>  4.6   |  20.0<L>  |  1.63<H>    Ca    8.3<L>      12 Oct 2018 05:51  Phos  4.4     10-12  Mg     2.3     10-12    TPro  6.9  /  Alb  2.7<L>  /  TBili  2.1<H>  /  DBili  x   /  AST  79<H>  /  ALT  54<H>  /  AlkPhos  117  10-11    CARDIAC MARKERS ( 11 Oct 2018 12:24 )  x     / 0.05 ng/mL / 32 U/L / x     / x        Serum Pro-Brain Natriuretic Peptide: 33895: NT-proBNP Interpretive comments:  Acute Congestive Heart Failure is unlikely if NT-proBNP is less than 300  pg/mL, for any age.  Consider Acute Congestive Heart Failure if:  AGE               NT-proBNP Result  ___               ________________  < 50year           > 450 pg/mL  50 - 75 years     > 900 pg/mL  > 75 years         > 1800 pg/ml  All results require clinical correlation. Consider obtaining a baseline or  "dry" NT-proBNP level when the patient is stabilized, so that subsequent  levels can be related to that. Patients with recurrent CHF may have  elevated  NT-proBNP levels. Acute failure episodes generally produce levels at  least 25  % greater than base line levels. The above values are derived from a large  multi-center international study, "JACOBY Neff, et al, European Heart  Journal,  2006; 27:330-337. pg/mL (10.11.18 @ 12:24)          Culture:    TTE:  < from: TTE Echo Complete w/Doppler (08.20.18 @ 11:10) >    EXAM:  ECHO TRANSTHORACIC COMP W DOPP      PROCEDURE DATE:  Aug 20 2018   .      INTERPRETATION:  REPORT:    TRANSTHORACIC ECHOCARDIOGRAM REPORT         Patient Name:   DAVID LUIS Patient Location: [facility] Rec #:  [id number]         Accession #:      [id number]  Account #:                       Height:           68.1 in 173.0 cm  YOB: 1935        Weight:           179.9 lb 81.60 kg  Patient Age:    83 years         BSA:              1.96 m²  Patient Gender: M        BP:               126/59 mmHg       Date of Exam:        8/20/2018 11:10:07 AM     Summary:   1. Technically difficult study.   2. Hyperdynamic global left ventricular systolic function. Left   ventricular ejection fraction, by visual estimation, is >75%.   3. Moderately increased LV wall thickness.   4. Normal RV size and systolic function.   5. A mechanical valve is present in the mitral position. Normal function.   6. Mild aortic regurgitation.   7. Moderate tricuspid regurgitation.   8. Mild pulmonic insufficiency.   9. Dilated aorta and pulmonary artery.  10. Estimated pulmonary artery systolic pressure is 42.9 mmHg assuming a   right atrial pressure of 3 mmHg, which is consistent with mild pulmonary   hypertension.  11. There is no evidence of pericardial effusion.  12. ** No prior echocardiograms available for comparison.    [id number] Jeannie Nieves DO, Electronically signed on 8/20/2018 at   12:40:47 PM              *** Final ***      < end of copied text >    RADIOLOGY    < from: CT Chest No Cont (10.11.18 @ 13:29) >   EXAM:  CT CHEST                          PROCEDURE DATE:  10/11/2018          INTERPRETATION:  Chest CT without contrast .  COMPARISON: A chest x-ray 10/11/2018.  CLINICAL INFORMATION: Shortness of breath . dyspnea..  TECHNIQUE: Contiguous axial 2.5 mm slice thickness images of the chest   were obtained without intravenous contrast administration. Maximum   intensity projection,(MIP), imaging was created and interpreted.  FINDINGS:  There is a cardiomegaly with a changes from prior cardiac valve   replacement . . Cardiac device wire leads are within right atrium and   right ventricle.    . There are moderate bilateral pleural effusions layering along the   posterior thoracic wall.  There are diffuse of bilateral interstitial pulmonary infiltrates with   groundglass opacities are concerning for a pulmonary edema of cardiac   origin .  The airway shows normal caliber and contour with patent lumen.    There are no mediastinal lymphadenopathy or masses.    The mediastinum great vessels arenormal.       Visualized upper abdominal viscera unremarkable.    The bones are normal.     IMPRESSION:    Cardiomegaly.  Bilateral moderate pleural effusions.  Diffuse interstitial pulmonary infiltrates with groundglass opacities in   the upper lobeof concerning for a pulmonary edema and effusion of   cardiac origin..                CARINA RECINOS M.D., ATTENDING RADIOLOGIST  This document has been electronically signed. Oct 11 2018  1:37PM                  < end of copied text >      MEDICATIONS  (STANDING):  ALBUTerol    90 MICROgram(s) HFA Inhaler 1 Puff(s) Inhalation every 4 hours  ALBUTerol/ipratropium for Nebulization 3 milliLiter(s) Nebulizer every 6 hours  atorvastatin 40 milliGRAM(s) Oral at bedtime  dextrose 5%. 1000 milliLiter(s) (50 mL/Hr) IV Continuous <Continuous>  dextrose 50% Injectable 12.5 Gram(s) IV Push once  dextrose 50% Injectable 25 Gram(s) IV Push once  dextrose 50% Injectable 25 Gram(s) IV Push once  furosemide   Injectable 40 milliGRAM(s) IV Push every 12 hours  insulin lispro (HumaLOG) corrective regimen sliding scale   SubCutaneous three times a day before meals  mirtazapine 7.5 milliGRAM(s) Oral at bedtime  pantoprazole    Tablet 40 milliGRAM(s) Oral before breakfast  pregabalin 75 milliGRAM(s) Oral two times a day  tiotropium 18 MICROgram(s) Capsule 1 Capsule(s) Inhalation daily      MEDICATIONS  (PRN):  dextrose 40% Gel 15 Gram(s) Oral once PRN Blood Glucose LESS THAN 70 milliGRAM(s)/deciLiter  glucagon  Injectable 1 milliGRAM(s) IntraMuscular once PRN Glucose <70 milliGRAM(s)/deciLiter

## 2018-10-13 LAB
ANION GAP SERPL CALC-SCNC: 22 MMOL/L — HIGH (ref 5–17)
APPEARANCE UR: CLEAR — SIGNIFICANT CHANGE UP
BACTERIA # UR AUTO: ABNORMAL
BILIRUB UR-MCNC: ABNORMAL
BUN SERPL-MCNC: 59 MG/DL — HIGH (ref 8–20)
CALCIUM SERPL-MCNC: 8 MG/DL — LOW (ref 8.6–10.2)
CHLORIDE SERPL-SCNC: 95 MMOL/L — LOW (ref 98–107)
CO2 SERPL-SCNC: 17 MMOL/L — LOW (ref 22–29)
COLOR SPEC: YELLOW — SIGNIFICANT CHANGE UP
CREAT SERPL-MCNC: 2.13 MG/DL — HIGH (ref 0.5–1.3)
DIFF PNL FLD: ABNORMAL
EPI CELLS # UR: SIGNIFICANT CHANGE UP
GLUCOSE BLDC GLUCOMTR-MCNC: 167 MG/DL — HIGH (ref 70–99)
GLUCOSE BLDC GLUCOMTR-MCNC: 178 MG/DL — HIGH (ref 70–99)
GLUCOSE BLDC GLUCOMTR-MCNC: 185 MG/DL — HIGH (ref 70–99)
GLUCOSE BLDC GLUCOMTR-MCNC: 194 MG/DL — HIGH (ref 70–99)
GLUCOSE SERPL-MCNC: 184 MG/DL — HIGH (ref 70–115)
GLUCOSE UR QL: NEGATIVE MG/DL — SIGNIFICANT CHANGE UP
HCT VFR BLD CALC: 31.1 % — LOW (ref 42–52)
HGB BLD-MCNC: 9.9 G/DL — LOW (ref 14–18)
INR BLD: 7.43 RATIO — CRITICAL HIGH (ref 0.88–1.16)
KETONES UR-MCNC: ABNORMAL
LEUKOCYTE ESTERASE UR-ACNC: ABNORMAL
MCHC RBC-ENTMCNC: 27.6 PG — SIGNIFICANT CHANGE UP (ref 27–31)
MCHC RBC-ENTMCNC: 31.8 G/DL — LOW (ref 32–36)
MCV RBC AUTO: 86.6 FL — SIGNIFICANT CHANGE UP (ref 80–94)
NITRITE UR-MCNC: NEGATIVE — SIGNIFICANT CHANGE UP
PH UR: 5 — SIGNIFICANT CHANGE UP (ref 5–8)
PLATELET # BLD AUTO: 145 K/UL — LOW (ref 150–400)
POTASSIUM SERPL-MCNC: 5.6 MMOL/L — HIGH (ref 3.5–5.3)
POTASSIUM SERPL-SCNC: 5.6 MMOL/L — HIGH (ref 3.5–5.3)
PROT UR-MCNC: 100 MG/DL
PROTHROM AB SERPL-ACNC: 85.1 SEC — HIGH (ref 9.8–12.7)
RBC # BLD: 3.59 M/UL — LOW (ref 4.6–6.2)
RBC # FLD: 18.7 % — HIGH (ref 11–15.6)
RBC CASTS # UR COMP ASSIST: ABNORMAL /HPF (ref 0–4)
SODIUM SERPL-SCNC: 134 MMOL/L — LOW (ref 135–145)
SP GR SPEC: 1.02 — SIGNIFICANT CHANGE UP (ref 1.01–1.02)
UROBILINOGEN FLD QL: 1 MG/DL
WBC # BLD: 12.3 K/UL — HIGH (ref 4.8–10.8)
WBC # FLD AUTO: 12.3 K/UL — HIGH (ref 4.8–10.8)
WBC UR QL: SIGNIFICANT CHANGE UP

## 2018-10-13 PROCEDURE — 76770 US EXAM ABDO BACK WALL COMP: CPT | Mod: 26

## 2018-10-13 PROCEDURE — 99233 SBSQ HOSP IP/OBS HIGH 50: CPT

## 2018-10-13 RX ORDER — SODIUM POLYSTYRENE SULFONATE 4.1 MEQ/G
30 POWDER, FOR SUSPENSION ORAL ONCE
Qty: 0 | Refills: 0 | Status: COMPLETED | OUTPATIENT
Start: 2018-10-13 | End: 2018-10-13

## 2018-10-13 RX ORDER — ONDANSETRON 8 MG/1
4 TABLET, FILM COATED ORAL EVERY 6 HOURS
Qty: 0 | Refills: 0 | Status: DISCONTINUED | OUTPATIENT
Start: 2018-10-13 | End: 2018-10-15

## 2018-10-13 RX ADMIN — Medication 1 TABLET(S): at 17:05

## 2018-10-13 RX ADMIN — PANTOPRAZOLE SODIUM 40 MILLIGRAM(S): 20 TABLET, DELAYED RELEASE ORAL at 05:55

## 2018-10-13 RX ADMIN — Medication 3 MILLILITER(S): at 08:46

## 2018-10-13 RX ADMIN — Medication 3 MILLILITER(S): at 15:25

## 2018-10-13 RX ADMIN — Medication 3 MILLILITER(S): at 20:48

## 2018-10-13 RX ADMIN — ONDANSETRON 4 MILLIGRAM(S): 8 TABLET, FILM COATED ORAL at 21:49

## 2018-10-13 RX ADMIN — ATORVASTATIN CALCIUM 40 MILLIGRAM(S): 80 TABLET, FILM COATED ORAL at 21:49

## 2018-10-13 RX ADMIN — ONDANSETRON 4 MILLIGRAM(S): 8 TABLET, FILM COATED ORAL at 14:22

## 2018-10-13 RX ADMIN — Medication 2: at 12:33

## 2018-10-13 RX ADMIN — Medication 2: at 08:41

## 2018-10-13 RX ADMIN — Medication 3 MILLILITER(S): at 02:50

## 2018-10-13 RX ADMIN — Medication 75 MILLIGRAM(S): at 05:55

## 2018-10-13 RX ADMIN — Medication 0.5 MILLIGRAM(S): at 08:46

## 2018-10-13 RX ADMIN — Medication 2: at 16:31

## 2018-10-13 RX ADMIN — SODIUM POLYSTYRENE SULFONATE 30 GRAM(S): 4.1 POWDER, FOR SUSPENSION ORAL at 16:23

## 2018-10-13 RX ADMIN — MIRTAZAPINE 7.5 MILLIGRAM(S): 45 TABLET, ORALLY DISINTEGRATING ORAL at 21:49

## 2018-10-13 RX ADMIN — Medication 0.5 MILLIGRAM(S): at 20:48

## 2018-10-13 NOTE — PROVIDER CONTACT NOTE (CHANGE IN STATUS NOTIFICATION) - ASSESSMENT
pt alert but forgetful and takes off 02 to have water and forgets to replace mask. pt encouraged to cough and deep breathe.

## 2018-10-13 NOTE — PROGRESS NOTE ADULT - SUBJECTIVE AND OBJECTIVE BOX
HOSPITALIST PROGRESS NOTE    DAVID LUIS  [id number]  83yMale    Patient is a 83y old  Male who presents with a chief complaint of dyspnea (12 Oct 2018 17:24)      SUBJECTIVE:   Chart reviewed since last visit.  Patient seen and examined at bedside.      OBJECTIVE:  Vital Signs Last 24 Hrs  T(C): 36.4 (13 Oct 2018 07:33), Max: 37.2 (12 Oct 2018 23:43)  T(F): 97.5 (13 Oct 2018 07:33), Max: 99 (12 Oct 2018 23:43)  HR: 76 (13 Oct 2018 08:47) (66 - 103)  BP: 93/62 (13 Oct 2018 07:33) (90/57 - 95/60)  BP(mean): 68 (12 Oct 2018 10:00) (68 - 68)  RR: 18 (13 Oct 2018 07:33) (18 - 27)  SpO2: 96% (13 Oct 2018 08:47) (89% - 98%)    PHYSICAL EXAMINATION  General: NAD[]   nontoxic[]   ill-appearing[]  Obese[]  HEENT: AT/NC[]  PERRLA[]  EOMI[]   Moist oral mucosa[]  Pharyngeal exudates[]  NECK: Supple[]  JVD[] Carotid bruit[]  CVS: RRR[]  Irregular[]  S1+S2[]   Murmur[]  RESP: Fair air entry bilaterally[]   Clear sounds[]   poor effort []  wheeze[]   Crackles[]  GI: Soft[]  Nondistended[]   Nontender[]   Bowel Sounds[]  Mass[]   HSM[]   Ascites[]  : suprapubic tenderness[]   CVA Tenderness[]   Felix[]  MS: FROM[]   Edema[]  CNS: AAOx3[]    Motor strength /5     DTR +    Sensory    Coordination    Gait  INTEG: Skin is Warm[]  dry[] Lesion[] Decubitus[]  PSYCH: Mood, Affect    MONITOR:  CAPILLARY BLOOD GLUCOSE      POCT Blood Glucose.: 194 mg/dL (13 Oct 2018 08:38)  POCT Blood Glucose.: 186 mg/dL (12 Oct 2018 22:42)  POCT Blood Glucose.: 183 mg/dL (12 Oct 2018 17:49)  POCT Blood Glucose.: 184 mg/dL (12 Oct 2018 11:59)        I&O's Summary    12 Oct 2018 07:01  -  13 Oct 2018 07:00  --------------------------------------------------------  IN: 240 mL / OUT: 925 mL / NET: -685 mL    Weight -                             9.9    12.3  )-----------( 145      ( 13 Oct 2018 07:24 )             31.1     INR -     134<L>  |  95<L>  |  59.0<H>  ----------------------------<  184<H>  5.6<H>   |  17.0<L>  |  2.13<H>    Ca    8.0<L>      13 Oct 2018 07:24  Phos  4.4     10-12  Mg     2.3     10-12    TPro  6.9  /  Alb  2.7<L>  /  TBili  2.1<H>  /  DBili  x   /  AST  79<H>  /  ALT  54<H>  /  AlkPhos  117  10-11    CARDIAC MARKERS ( 11 Oct 2018 12:24 )  x     / 0.05 ng/mL / 32 U/L / x     / x              Culture:    TTE:    RADIOLOGY        MEDICATIONS  (STANDING):  ALBUTerol    90 MICROgram(s) HFA Inhaler 1 Puff(s) Inhalation every 4 hours  ALBUTerol/ipratropium for Nebulization 3 milliLiter(s) Nebulizer every 6 hours  atorvastatin 40 milliGRAM(s) Oral at bedtime  buDESOnide   0.5 milliGRAM(s) Respule 0.5 milliGRAM(s) Inhalation two times a day  dextrose 5%. 1000 milliLiter(s) (50 mL/Hr) IV Continuous <Continuous>  dextrose 50% Injectable 12.5 Gram(s) IV Push once  dextrose 50% Injectable 25 Gram(s) IV Push once  dextrose 50% Injectable 25 Gram(s) IV Push once  insulin lispro (HumaLOG) corrective regimen sliding scale   SubCutaneous three times a day before meals  mirtazapine 7.5 milliGRAM(s) Oral at bedtime  multivitamin/minerals 1 Tablet(s) Oral daily  pantoprazole    Tablet 40 milliGRAM(s) Oral before breakfast  pregabalin 75 milliGRAM(s) Oral two times a day  sodium polystyrene sulfonate Suspension 30 Gram(s) Oral once  tiotropium 18 MICROgram(s) Capsule 1 Capsule(s) Inhalation daily      MEDICATIONS  (PRN):  dextrose 40% Gel 15 Gram(s) Oral once PRN Blood Glucose LESS THAN 70 milliGRAM(s)/deciLiter  glucagon  Injectable 1 milliGRAM(s) IntraMuscular once PRN Glucose <70 milliGRAM(s)/deciLiter  guaiFENesin    Syrup 100 milliGRAM(s) Oral every 6 hours PRN Cough HOSPITALIST PROGRESS NOTE    DAVID LUIS  [id number]  83yMale    Patient is a 83y old  Male who presents with a chief complaint of dyspnea (12 Oct 2018 17:24)      SUBJECTIVE:   Chart reviewed since last visit.  Patient seen and examined at bedside today for CHF, respiratory failure. Denies dyspnea, chest pain, palpitations or chills  Still with cough.  Discussed with daughter at bedside, was on 2LNC at home gradually increasing over the past few days      OBJECTIVE:  Vital Signs Last 24 Hrs  T(C): 36.4 (13 Oct 2018 07:33), Max: 37.2 (12 Oct 2018 23:43)  T(F): 97.5 (13 Oct 2018 07:33), Max: 99 (12 Oct 2018 23:43)  HR: 76 (13 Oct 2018 08:47) (66 - 103)  BP: 93/62 (13 Oct 2018 07:33) (90/57 - 95/60)  BP(mean): 68 (12 Oct 2018 10:00) (68 - 68)  RR: 18 (13 Oct 2018 07:33) (18 - 27)  SpO2: 96% (13 Oct 2018 08:47) (89% - 98%)    PHYSICAL EXAMINATION  General: no acute distress, lying in bed  HEENT: NC 5 L in place  NECK: Supple[+]  JVD[-] Carotid bruit[]  CVS: RRR[]  Irregular[+]  S1+S2[+]  PPM[+]  RESP: Fair air entry bilaterally[+] except bases with decreased sounds and bibasilar crackles  No wheeze  GI: Soft[+]  Nondistended[+]   Nontender[+]   Bowel Sounds[+]  Mass[]   HSM[]   Ascites[]  : suprapubic tenderness[-]   CVA Tenderness[]   Felix[-] [place] catheter  MS: FROM[+]   Edema[+]grossly intact   INTEG: Skin is Warm[]  dry[] Lesion[] Decubitus[]  PSYCH: Fair Mood, Affect - fatigued      MONITOR:  CAPILLARY BLOOD GLUCOSE      POCT Blood Glucose.: 194 mg/dL (13 Oct 2018 08:38)  POCT Blood Glucose.: 186 mg/dL (12 Oct 2018 22:42)  POCT Blood Glucose.: 183 mg/dL (12 Oct 2018 17:49)  POCT Blood Glucose.: 184 mg/dL (12 Oct 2018 11:59)        I&O's Summary    12 Oct 2018 07:01  -  13 Oct 2018 07:00  --------------------------------------------------------  IN: 240 mL / OUT: 925 mL / NET: -685 mL    Weight -                             9.9    12.3  )-----------( 145      ( 13 Oct 2018 07:24 )             31.1     INR -     134<L>  |  95<L>  |  59.0<H>  ----------------------------<  184<H>  5.6<H>   |  17.0<L>  |  2.13<H>    Ca    8.0<L>      13 Oct 2018 07:24  Phos  4.4     10-12  Mg     2.3     10-12    TPro  6.9  /  Alb  2.7<L>  /  TBili  2.1<H>  /  DBili  x   /  AST  79<H>  /  ALT  54<H>  /  AlkPhos  117  10-11    CARDIAC MARKERS ( 11 Oct 2018 12:24 )  x     / 0.05 ng/mL / 32 U/L / x     / x              Culture:    TTE:    RADIOLOGY        MEDICATIONS  (STANDING):  ALBUTerol    90 MICROgram(s) HFA Inhaler 1 Puff(s) Inhalation every 4 hours  ALBUTerol/ipratropium for Nebulization 3 milliLiter(s) Nebulizer every 6 hours  atorvastatin 40 milliGRAM(s) Oral at bedtime  buDESOnide   0.5 milliGRAM(s) Respule 0.5 milliGRAM(s) Inhalation two times a day  dextrose 5%. 1000 milliLiter(s) (50 mL/Hr) IV Continuous <Continuous>  dextrose 50% Injectable 12.5 Gram(s) IV Push once  dextrose 50% Injectable 25 Gram(s) IV Push once  dextrose 50% Injectable 25 Gram(s) IV Push once  insulin lispro (HumaLOG) corrective regimen sliding scale   SubCutaneous three times a day before meals  mirtazapine 7.5 milliGRAM(s) Oral at bedtime  multivitamin/minerals 1 Tablet(s) Oral daily  pantoprazole    Tablet 40 milliGRAM(s) Oral before breakfast  pregabalin 75 milliGRAM(s) Oral two times a day  sodium polystyrene sulfonate Suspension 30 Gram(s) Oral once  tiotropium 18 MICROgram(s) Capsule 1 Capsule(s) Inhalation daily      MEDICATIONS  (PRN):  dextrose 40% Gel 15 Gram(s) Oral once PRN Blood Glucose LESS THAN 70 milliGRAM(s)/deciLiter  glucagon  Injectable 1 milliGRAM(s) IntraMuscular once PRN Glucose <70 milliGRAM(s)/deciLiter  guaiFENesin    Syrup 100 milliGRAM(s) Oral every 6 hours PRN Cough

## 2018-10-13 NOTE — CONSULT NOTE ADULT - ASSESSMENT
In summary, DAVID LUIS is a 83y Male with past medical history significant for   hx of chronic diastolic CHF, DM, HTN, anemia, COPD on home O2 therapy.  chronic anemia.  Extensive cardiac hx with CABG 20 yrs ago.  Mechanical MVR IN 2012.  S/P PPM implant.  Cardio MEMS  Recent hospitalization 2/2 CHF and hypoxia.  Pt presents with confusion and dyspnea.  Volume overloaded. Anasarca    - Acute on chronic severe diastolic congestive heart failure.  Pt had been given salt tabs without diuretic.  Severe increase of BNP noted. Responded to diuresis.    -Likely has CKD and now MARIBEL -likely pre renal d/t cardiac dysfunnction and diuretics - on hold now  Hyperkalemia - cause unclear - got Kayexalate  Anemia - check Iron and FOBT    shall follow . Thanks

## 2018-10-13 NOTE — PROGRESS NOTE ADULT - PROBLEM SELECTOR PLAN 3
Daily weights, I/O  Renal Sonogram  Monitor SCr  Hold Lasix, Lyrica  Nephrology consult - Dr Jeffrey Daily weights, I/O  Renal Sonogram  Monitor SCr  Hold Lasix, Lyrica.  Avoid Nephrotoxin  Nephrology consult - Dr Jeffrey

## 2018-10-13 NOTE — PROVIDER CONTACT NOTE (CHANGE IN STATUS NOTIFICATION) - SITUATION
pt o2 desatsating on 5 liters nasal o2. pt placed to 6 liters then 50%ventimask and o2 wnl not desating. pt on moniter and had irrigular rhythm/afib heart rate maintains in the 70-80's. pt has pacemak

## 2018-10-13 NOTE — PROGRESS NOTE ADULT - ASSESSMENT
83 year old male with PMH HTN, T2DM, COPD on home O2, CAD s/p CABG, chronic AF, s/p PPM s/p MVR on Coumadin, Chronic anemia, recent admission for GIB likely CKD3 presented with progressive dyspnea and fatigue and admitted for acute on chronic hypoxic respiratory failure secondary to acutely decompensated CHFpEF secondary to salt tablets in Nursing Home.  Mild leukocytosis, anemia and elevated SCr at admission BNP 02096, Tn 0.05, Chest X-Ray with congestive changes and EKG with AF.    Patient required NIPPV in ER and admitted to MICU, improved overnight with IV Lasix - now no longer on NIPPV. CT scan chest with pleural effusion and edema. Downgraded to medical service for further management.    MARIBEL, likely secondary to diuretics, possible CRS 83 year old male with PMH HTN, T2DM, COPD on home O2, CAD s/p CABG, chronic AF, s/p PPM s/p MVR on Coumadin, Chronic anemia, recent admission for GIB likely CKD3 presented with progressive dyspnea and fatigue and admitted for acute on chronic hypoxic respiratory failure secondary to acutely decompensated CHFpEF secondary to salt tablets in Nursing Home.  Mild leukocytosis, anemia and elevated SCr at admission BNP 51525, Tn 0.05, Chest X-Ray with congestive changes and EKG with AF.    Patient required NIPPV in ER and admitted to MICU, improved overnight with IV Lasix - now no longer on NIPPV. CT scan chest with pleural effusion and edema. Downgraded to medical service for further management.    Respiratory failure has improved though still requiring higher than home O2. Doubt COPD exacerbation.    MARIBEL, likely secondary to diuretics, possible CRS.

## 2018-10-13 NOTE — PROGRESS NOTE ADULT - SUBJECTIVE AND OBJECTIVE BOX
[facility], [facility]                                                    [address], [address], [place] [address]                                                         PHONE: [phone]    FAX: [phone] [address], [address], [place] [address]                                                 PHONE: [phone]    FAX: [phone]  *******************************************************************************  cc: confusion. SOB    HPI:    83M with hx of chronic diastolic CHF, DM, HTN, anemia, COPD on home O2 therapy.  chronic anemia.  Extensive cardiac hx with CABG 20 yrs ago.  Mechanical MVR IN 2012.  S/P PPM implant.  Recent hospitalization 2/2 CHF and hypoxia.  Pt presents with confusion and dyspnea.  Volume overloaded.  Denies CP.  No fever, chills or constitutional symptoms    Overnight events/Subjective Assessment: no new complaints    INTERPRETATION OF TELEMETRY (personally reviewed):    No Known Allergies    Patient History:    Past Medical History:  CHF (congestive heart failure)    Chronic pain    COPD (chronic obstructive pulmonary disease)    Diabetes    Insomnia    Mitral valve replaced    Pacemaker.     Past Surgical History:  S/P CABG (coronary artery bypass graft)    S/P MVR (mitral valve repair).     Social History:  Social History (marital status, living situation, occupation, tobacco use, alcohol and drug use, and sexual history): Former Smoker  No ETOH    REVIEW OF SYSTEMS: As above. No other pertinent ROS      MEDICATIONS  (STANDING):  ALBUTerol    90 MICROgram(s) HFA Inhaler 1 Puff(s) Inhalation every 4 hours  ALBUTerol/ipratropium for Nebulization 3 milliLiter(s) Nebulizer every 6 hours  atorvastatin 40 milliGRAM(s) Oral at bedtime  buDESOnide   0.5 milliGRAM(s) Respule 0.5 milliGRAM(s) Inhalation two times a day  dextrose 5%. 1000 milliLiter(s) (50 mL/Hr) IV Continuous <Continuous>  dextrose 50% Injectable 12.5 Gram(s) IV Push once  dextrose 50% Injectable 25 Gram(s) IV Push once  dextrose 50% Injectable 25 Gram(s) IV Push once  insulin lispro (HumaLOG) corrective regimen sliding scale   SubCutaneous three times a day before meals  mirtazapine 7.5 milliGRAM(s) Oral at bedtime  multivitamin/minerals 1 Tablet(s) Oral daily  pantoprazole    Tablet 40 milliGRAM(s) Oral before breakfast  sodium polystyrene sulfonate Suspension 30 Gram(s) Oral once  tiotropium 18 MICROgram(s) Capsule 1 Capsule(s) Inhalation daily    MEDICATIONS  (PRN):  dextrose 40% Gel 15 Gram(s) Oral once PRN Blood Glucose LESS THAN 70 milliGRAM(s)/deciLiter  glucagon  Injectable 1 milliGRAM(s) IntraMuscular once PRN Glucose <70 milliGRAM(s)/deciLiter  guaiFENesin    Syrup 100 milliGRAM(s) Oral every 6 hours PRN Cough      Vital Signs Last 24 Hrs  T(C): 36.4 (13 Oct 2018 07:33), Max: 37.2 (12 Oct 2018 23:43)  T(F): 97.5 (13 Oct 2018 07:33), Max: 99 (12 Oct 2018 23:43)  HR: 76 (13 Oct 2018 08:47) (66 - 103)  BP: 93/62 (13 Oct 2018 07:33) (90/57 - 95/60)  BP(mean): 68 (12 Oct 2018 10:00) (68 - 68)  RR: 18 (13 Oct 2018 07:33) (18 - 27)  SpO2: 96% (13 Oct 2018 08:47) (89% - 98%)    I&O's Detail    12 Oct 2018 07:01  -  13 Oct 2018 07:00  --------------------------------------------------------  IN:    Oral Fluid: 240 mL  Total IN: 240 mL    OUT:    Incontinent per Condom Catheter: 925 mL  Total OUT: 925 mL    Total NET: -685 mL        I&O's Summary    12 Oct 2018 07:01  -  13 Oct 2018 07:00  --------------------------------------------------------  IN: 240 mL / OUT: 925 mL / NET: -685 mL            PHYSICAL EXAM:  General: Appears well developed, well nourished, no acute distress  HEENT: Head: normocephalic, atraumatic  Eyes: Pupils equal and reactive  Neck: Supple, no carotid bruit, no JVD, no HJR  CARDIOVASCULAR: Normal S1 and S2, no murmur, rub, or gallop  LUNGS: Clear to auscultation bilaterally, no rales, rhonchi or wheeze  ABDOMEN: Soft, nontender, non-distended, positive bowel sounds, no mass or bruit  EXTREMITIES: No edema, distal pulses WNL  SKIN: Warm and dry with normal turgor  NEURO: Alert & oriented x 3, grossly intact  PSYCH: normal mood and affect          LABS:                        9.9    12.3  )-----------( 145      ( 13 Oct 2018 07:24 )             31.1     10-13    134<L>  |  95<L>  |  59.0<H>  ----------------------------<  184<H>  5.6<H>   |  17.0<L>  |  2.13<H>    Ca    8.0<L>      13 Oct 2018 07:24  Phos  4.4     10-12  Mg     2.3     10-12    TPro  6.9  /  Alb  2.7<L>  /  TBili  2.1<H>  /  DBili  x   /  AST  79<H>  /  ALT  54<H>  /  AlkPhos  117  10-11    CARDIAC MARKERS ( 11 Oct 2018 12:24 )  x     / 0.05 ng/mL / 32 U/L / x     / x          PT/INR - ( 13 Oct 2018 07:24 )   PT: 85.1 sec;   INR: 7.43 ratio         PTT - ( 12 Oct 2018 13:00 )  PTT:43.8 sec  Serum Pro-Brain Natriuretic Peptide: 75458 pg/mL (10-11 @ 12:24)  serum  Lipids:   Hemoglobin A1C, Whole Blood: 5.3 % (10-12 @ 13:00)        RADIOLOGY & ADDITIONAL STUDIES:        ASSESSMENT AND PLAN:  In summary, DAVID LUIS is a 83y Male with past medical history significant for   hx of chronic diastolic CHF, DM, HTN, anemia, COPD on home O2 therapy.  chronic anemia.  Extensive cardiac hx with CABG 20 yrs ago.  Mechanical MVR IN 2012.  S/P PPM implant.  Cardio MEMS  Recent hospitalization 2/2 CHF and hypoxia.  Pt presents with confusion and dyspnea.  Volume overloaded. Anasarca  Denies CP.  No fever, chills or constitutional symptoms    - Acute on chronic severe diastolic congestive heart failure.  Pt had been given salt tabs without diuretic.  Severe increase of BNP noted. Responded to diuresis. Parameters on meds due to tendency to low BP's. Diuretic now held due to azotemia and CRI. Resume low dose diuretic when stabilized. Pt with over diuresis.    - Acute renal failure.  Agree likely cardiorenal. Continue to monitor    - CAD.  Past CABG. PPM. No evidence of ACS. Medical management for now. Would add ASA    - Mechanical MVR.  SBE prophylaxis for procedures that entail bacteremia.  Anticoagulation per medical. INR 3 range    - CAF. Anticoagulation for MVR and AF    - HTN. BP is to low side    - COPD. O2. Pt maintains O2 as an outpt    - PPM. outpt fu with Dr Garcia on DC    - poor po intake. Encourage adequate diet.    - May need PT evaluation/rehab on DC    - DW daughter at bedside.      Sona Ruiz MD

## 2018-10-13 NOTE — CONSULT NOTE ADULT - SUBJECTIVE AND OBJECTIVE BOX
Patient is a 83y old  Male who presents with a chief complaint of sob (13 Oct 2018 09:57)  Has worsening creatinine for which called  No clearcut hx CKD per family      HPI:  83M with a PMHx of Diastolic CHF, DM, HTN, Chronic Anemia, COPD on Home O2, S/P MVR on Coumadin.  Recent hospitalization for CHF and hypoxia.   Now presents for increasing weakness and lethargy. recently DC from [facility]. His wife describes a state in over all decline, with poor PO intake/Appetite. He denies Chest pain, No nausea/vomiting, fevers, chills, cough  or diarrhea.   Found to have O2 sats in the 70s upon arrival with mild confusion and dyspnea. He was placed on HF NC OF with improvement in O2 sat to the low 90s.     Of Note he was recently placed on Salt tablets at the Rehab center without diuretics (11 Oct 2018 14:22)      PAST MEDICAL & SURGICAL HISTORY:  COPD (chronic obstructive pulmonary disease)  CHF (congestive heart failure)  Chronic pain  Insomnia  Diabetes  Mitral valve replaced  Pacemaker  S/P MVR (mitral valve repair)  S/P CABG (coronary artery bypass graft)      FAMILY HISTORY:  No pertinent family history in first degree relatives      REVIEW OF SYSTEMS:  CONSTITUTIONAL: No fever, weight loss, + fatigue  EYES: No eye pain, No visual disturbances,   RESPIRATORY: No cough, hemoptysis; + SOB  CARDIOVASCULAR: No chest pain, No palpitations,   -leg swelling  GASTROINTESTINAL: No abdominal , NVD or GIB  GENITOURINARY: No UTI sx/hematuria  NEUROLOGICAL: No HA/wk/numbness  MUSCULOSKELETAL: No joint pain, No swelling      Vital Signs Last 24 Hrs  T(C): 35.9 (13 Oct 2018 15:00), Max: 37.2 (12 Oct 2018 23:43)  T(F): 96.7 (13 Oct 2018 15:00), Max: 99 (12 Oct 2018 23:43)  HR: 63 (13 Oct 2018 20:50) (63 - 82)  BP: 91/54 (13 Oct 2018 15:00) (90/57 - 94/60)  BP(mean): --  RR: 19 (13 Oct 2018 15:00) (18 - 19)  SpO2: 94% (13 Oct 2018 20:50) (94% - 98%)    PHYSICAL EXAM:    GENERAL: NAD,   EYES:  conjunctiva and sclera clear  NECK: Supple, No JVD/Carotid Bruit -ve   NERVOUS SYSTEM:  A/O x3,   Lungs : No rales, No rhonchi,   HEART:  No murmur,  No rub, No gallops  ABDOMEN: Soft, NT/ND BS+  EXTREMITIES:  + Peripheral Pulses, - edema  SKIN: No rashes or lesions      LABS:                        9.9    12.3  )-----------( 145      ( 13 Oct 2018 07:24 )             31.1     10-13    134<L>  |  95<L>  |  59.0<H>  ----------------------------<  184<H>  5.6<H>   |  17.0<L>  |  2.13<H>    Ca    8.0<L>      13 Oct 2018 07:24  Phos  4.4     10-12  Mg     2.3     10-12      PT/INR - ( 13 Oct 2018 07:24 )   PT: 85.1 sec;   INR: 7.43 ratio         PTT - ( 12 Oct 2018 13:00 )  PTT:43.8 sec  Urinalysis Basic - ( 13 Oct 2018 21:06 )    Color: Yellow / Appearance: Clear / S.025 / pH: x  Gluc: x / Ketone: Trace  / Bili: Small / Urobili: 1 mg/dL   Blood: x / Protein: 100 mg/dL / Nitrite: Negative   Leuk Esterase: Moderate / RBC: 3-5 /HPF / WBC 0-2   Sq Epi: x / Non Sq Epi: Occasional / Bacteria: Few          RADIOLOGY & ADDITIONAL TESTS: Renal Sono - unremarkable    MEDICATIONS  (STANDING):  ALBUTerol    90 MICROgram(s) HFA Inhaler  ALBUTerol/ipratropium for Nebulization  atorvastatin  buDESOnide   0.5 milliGRAM(s) Respule  dextrose 40% Gel PRN  dextrose 5%.  dextrose 50% Injectable  dextrose 50% Injectable  dextrose 50% Injectable  glucagon  Injectable PRN  guaiFENesin    Syrup PRN  insulin lispro (HumaLOG) corrective regimen sliding scale  mirtazapine  multivitamin/minerals  ondansetron    Tablet PRN  pantoprazole    Tablet  tiotropium 18 MICROgram(s) Capsule

## 2018-10-14 DIAGNOSIS — N30.00 ACUTE CYSTITIS WITHOUT HEMATURIA: ICD-10-CM

## 2018-10-14 LAB
ANION GAP SERPL CALC-SCNC: 21 MMOL/L — HIGH (ref 5–17)
BUN SERPL-MCNC: 76 MG/DL — HIGH (ref 8–20)
CALCIUM SERPL-MCNC: 7.5 MG/DL — LOW (ref 8.6–10.2)
CHLORIDE SERPL-SCNC: 95 MMOL/L — LOW (ref 98–107)
CO2 SERPL-SCNC: 18 MMOL/L — LOW (ref 22–29)
CREAT SERPL-MCNC: 2.91 MG/DL — HIGH (ref 0.5–1.3)
FERRITIN SERPL-MCNC: 1633 NG/ML — HIGH (ref 30–400)
GLUCOSE BLDC GLUCOMTR-MCNC: 142 MG/DL — HIGH (ref 70–99)
GLUCOSE BLDC GLUCOMTR-MCNC: 146 MG/DL — HIGH (ref 70–99)
GLUCOSE BLDC GLUCOMTR-MCNC: 160 MG/DL — HIGH (ref 70–99)
GLUCOSE BLDC GLUCOMTR-MCNC: 171 MG/DL — HIGH (ref 70–99)
GLUCOSE BLDC GLUCOMTR-MCNC: 227 MG/DL — HIGH (ref 70–99)
GLUCOSE SERPL-MCNC: 184 MG/DL — HIGH (ref 70–115)
HCT VFR BLD CALC: 30.2 % — LOW (ref 42–52)
HGB BLD-MCNC: 10.1 G/DL — LOW (ref 14–18)
INR BLD: 6.4 RATIO — CRITICAL HIGH (ref 0.88–1.16)
IRON SATN MFR SERPL: 31 % — SIGNIFICANT CHANGE UP (ref 16–55)
IRON SATN MFR SERPL: 47 UG/DL — LOW (ref 59–158)
MCHC RBC-ENTMCNC: 28 PG — SIGNIFICANT CHANGE UP (ref 27–31)
MCHC RBC-ENTMCNC: 33.4 G/DL — SIGNIFICANT CHANGE UP (ref 32–36)
MCV RBC AUTO: 83.7 FL — SIGNIFICANT CHANGE UP (ref 80–94)
PLATELET # BLD AUTO: 140 K/UL — LOW (ref 150–400)
POTASSIUM SERPL-MCNC: 5.3 MMOL/L — SIGNIFICANT CHANGE UP (ref 3.5–5.3)
POTASSIUM SERPL-SCNC: 5.3 MMOL/L — SIGNIFICANT CHANGE UP (ref 3.5–5.3)
PROTHROM AB SERPL-ACNC: 73.1 SEC — HIGH (ref 9.8–12.7)
RBC # BLD: 3.61 M/UL — LOW (ref 4.6–6.2)
RBC # FLD: 19.2 % — HIGH (ref 11–15.6)
SODIUM SERPL-SCNC: 134 MMOL/L — LOW (ref 135–145)
TIBC SERPL-MCNC: 152 UG/DL — LOW (ref 220–430)
TRANSFERRIN SERPL-MCNC: 106 MG/DL — LOW (ref 180–329)
WBC # BLD: 15 K/UL — HIGH (ref 4.8–10.8)
WBC # FLD AUTO: 15 K/UL — HIGH (ref 4.8–10.8)

## 2018-10-14 PROCEDURE — 99233 SBSQ HOSP IP/OBS HIGH 50: CPT

## 2018-10-14 RX ORDER — ASPIRIN/CALCIUM CARB/MAGNESIUM 324 MG
81 TABLET ORAL DAILY
Qty: 0 | Refills: 0 | Status: DISCONTINUED | OUTPATIENT
Start: 2018-10-14 | End: 2018-10-15

## 2018-10-14 RX ORDER — SODIUM CHLORIDE 9 MG/ML
500 INJECTION INTRAMUSCULAR; INTRAVENOUS; SUBCUTANEOUS ONCE
Qty: 0 | Refills: 0 | Status: COMPLETED | OUTPATIENT
Start: 2018-10-14 | End: 2018-10-14

## 2018-10-14 RX ORDER — CEFTRIAXONE 500 MG/1
1 INJECTION, POWDER, FOR SOLUTION INTRAMUSCULAR; INTRAVENOUS ONCE
Qty: 0 | Refills: 0 | Status: COMPLETED | OUTPATIENT
Start: 2018-10-14 | End: 2018-10-14

## 2018-10-14 RX ORDER — ALBUMIN HUMAN 25 %
50 VIAL (ML) INTRAVENOUS ONCE
Qty: 0 | Refills: 0 | Status: COMPLETED | OUTPATIENT
Start: 2018-10-14 | End: 2018-10-14

## 2018-10-14 RX ORDER — SODIUM CHLORIDE 9 MG/ML
1000 INJECTION INTRAMUSCULAR; INTRAVENOUS; SUBCUTANEOUS
Qty: 0 | Refills: 0 | Status: DISCONTINUED | OUTPATIENT
Start: 2018-10-14 | End: 2018-10-15

## 2018-10-14 RX ORDER — CEFTRIAXONE 500 MG/1
1 INJECTION, POWDER, FOR SOLUTION INTRAMUSCULAR; INTRAVENOUS EVERY 24 HOURS
Qty: 0 | Refills: 0 | Status: DISCONTINUED | OUTPATIENT
Start: 2018-10-15 | End: 2018-10-15

## 2018-10-14 RX ORDER — CEFTRIAXONE 500 MG/1
INJECTION, POWDER, FOR SOLUTION INTRAMUSCULAR; INTRAVENOUS
Qty: 0 | Refills: 0 | Status: DISCONTINUED | OUTPATIENT
Start: 2018-10-14 | End: 2018-10-15

## 2018-10-14 RX ORDER — ERYTHROPOIETIN 10000 [IU]/ML
10000 INJECTION, SOLUTION INTRAVENOUS; SUBCUTANEOUS
Qty: 0 | Refills: 0 | Status: DISCONTINUED | OUTPATIENT
Start: 2018-10-14 | End: 2018-10-14

## 2018-10-14 RX ADMIN — PANTOPRAZOLE SODIUM 40 MILLIGRAM(S): 20 TABLET, DELAYED RELEASE ORAL at 06:03

## 2018-10-14 RX ADMIN — Medication 0.5 MILLIGRAM(S): at 08:35

## 2018-10-14 RX ADMIN — CEFTRIAXONE 100 GRAM(S): 500 INJECTION, POWDER, FOR SOLUTION INTRAMUSCULAR; INTRAVENOUS at 14:07

## 2018-10-14 RX ADMIN — Medication 3 MILLILITER(S): at 21:12

## 2018-10-14 RX ADMIN — Medication 0.5 MILLIGRAM(S): at 21:12

## 2018-10-14 RX ADMIN — Medication 4: at 09:02

## 2018-10-14 RX ADMIN — Medication 3 MILLILITER(S): at 08:35

## 2018-10-14 RX ADMIN — ATORVASTATIN CALCIUM 40 MILLIGRAM(S): 80 TABLET, FILM COATED ORAL at 21:32

## 2018-10-14 RX ADMIN — SODIUM CHLORIDE 1000 MILLILITER(S): 9 INJECTION INTRAMUSCULAR; INTRAVENOUS; SUBCUTANEOUS at 19:24

## 2018-10-14 RX ADMIN — SODIUM CHLORIDE 83 MILLILITER(S): 9 INJECTION INTRAMUSCULAR; INTRAVENOUS; SUBCUTANEOUS at 11:01

## 2018-10-14 RX ADMIN — Medication 2: at 14:05

## 2018-10-14 RX ADMIN — Medication 3 MILLILITER(S): at 03:09

## 2018-10-14 RX ADMIN — Medication 1 TABLET(S): at 14:05

## 2018-10-14 RX ADMIN — MIRTAZAPINE 7.5 MILLIGRAM(S): 45 TABLET, ORALLY DISINTEGRATING ORAL at 21:32

## 2018-10-14 RX ADMIN — ONDANSETRON 4 MILLIGRAM(S): 8 TABLET, FILM COATED ORAL at 11:02

## 2018-10-14 RX ADMIN — Medication 3 MILLILITER(S): at 16:11

## 2018-10-14 RX ADMIN — Medication 50 MILLILITER(S): at 21:32

## 2018-10-14 NOTE — PROGRESS NOTE ADULT - SUBJECTIVE AND OBJECTIVE BOX
HOSPITALIST PROGRESS NOTE    DAVID LUIS  [id number]  83yMale    Patient is a 83y old  Male who presents with a chief complaint of CHF (14 Oct 2018 08:00)      SUBJECTIVE:   Chart reviewed since last visit.  Patient seen and examined at bedside for CHF, MARIBEL, UTI  Feels better in terms of dyspnea, denies cough, chest pain or palpitation.   Nauseous yesterday, improved. No chills or fevers.      OBJECTIVE:  Vital Signs Last 24 Hrs  T(C): 36.4 (14 Oct 2018 15:00), Max: 36.7 (13 Oct 2018 23:18)  T(F): 97.5 (14 Oct 2018 15:00), Max: 98 (13 Oct 2018 23:18)  HR: 77 (14 Oct 2018 15:00) (60 - 82)  BP: 83/55 (14 Oct 2018 15:00) (83/55 - 91/58)   RR: 18 (14 Oct 2018 15:00) (18 - 20)  SpO2: 97% (14 Oct 2018 15:00) (88% - 97%)    PHYSICAL EXAMINATION  General: no acute distress, lying in bed  HEENT: NC 5L in place  NECK: Supple[+]  JVD[-] Carotid bruit[]  CVS: RRR[]  Irregular[+]  S1+S2[+]  PPM[+]  RESP: Fair air entry bilaterally[+] except bases with decreased sounds and bibasilar crackles  No wheeze - improved  GI: Soft[+]  Nondistended[+]   Nontender[+]   Bowel Sounds[+]  Mass[]   HSM[]   Ascites[]  : suprapubic tenderness[+]   CVA Tenderness[]   Felix[-] [place] catheter  MS: FROM[+]   Edema[+]grossly intact   INTEG: Skin is Warm[]  dry[] Lesion[] Decubitus[]  PSYCH: Fair Mood, Affect - fatigued  MONITOR:  CAPILLARY BLOOD GLUCOSE      POCT Blood Glucose.: 146 mg/dL (14 Oct 2018 17:11)  POCT Blood Glucose.: 160 mg/dL (14 Oct 2018 14:04)  POCT Blood Glucose.: 171 mg/dL (14 Oct 2018 12:37)  POCT Blood Glucose.: 227 mg/dL (14 Oct 2018 08:46)  POCT Blood Glucose.: 178 mg/dL (13 Oct 2018 22:43)        I&O's Summary    14 Oct 2018 07:01  -  14 Oct 2018 17:43  --------------------------------------------------------  IN: 701 mL / OUT: 20 mL / NET: 681 mL                            10.1   15.0  )-----------( 140      ( 14 Oct 2018 07:19 )             30.2     PT/INR - ( 14 Oct 2018 07:19 )   PT: 73.1 sec;   INR: 6.40 ratio           10-    134<L>  |  95<L>  |  76.0<H>  ----------------------------<  184<H>  5.3   |  18.0<L>  |  2.91<H>    Ca    7.5<L>      14 Oct 2018 07:19          Urinalysis Basic - ( 13 Oct 2018 21:06 )    Color: Yellow / Appearance: Clear / S.025 / pH: x  Gluc: x / Ketone: Trace  / Bili: Small / Urobili: 1 mg/dL   Blood: x / Protein: 100 mg/dL / Nitrite: Negative   Leuk Esterase: Moderate / RBC: 3-5 /HPF / WBC 0-2   Sq Epi: x / Non Sq Epi: Occasional / Bacteria: Few        Culture:    TTE:    RADIOLOGY        MEDICATIONS  (STANDING):  ALBUTerol    90 MICROgram(s) HFA Inhaler 1 Puff(s) Inhalation every 4 hours  ALBUTerol/ipratropium for Nebulization 3 milliLiter(s) Nebulizer every 6 hours  aspirin enteric coated 81 milliGRAM(s) Oral daily  atorvastatin 40 milliGRAM(s) Oral at bedtime  buDESOnide   0.5 milliGRAM(s) Respule 0.5 milliGRAM(s) Inhalation two times a day  cefTRIAXone   IVPB      dextrose 5%. 1000 milliLiter(s) (50 mL/Hr) IV Continuous <Continuous>  dextrose 50% Injectable 12.5 Gram(s) IV Push once  dextrose 50% Injectable 25 Gram(s) IV Push once  dextrose 50% Injectable 25 Gram(s) IV Push once  insulin lispro (HumaLOG) corrective regimen sliding scale   SubCutaneous three times a day before meals  mirtazapine 7.5 milliGRAM(s) Oral at bedtime  multivitamin/minerals 1 Tablet(s) Oral daily  pantoprazole    Tablet 40 milliGRAM(s) Oral before breakfast  sodium chloride 0.9%. 1000 milliLiter(s) (83 mL/Hr) IV Continuous <Continuous>  tiotropium 18 MICROgram(s) Capsule 1 Capsule(s) Inhalation daily      MEDICATIONS  (PRN):  dextrose 40% Gel 15 Gram(s) Oral once PRN Blood Glucose LESS THAN 70 milliGRAM(s)/deciLiter  glucagon  Injectable 1 milliGRAM(s) IntraMuscular once PRN Glucose <70 milliGRAM(s)/deciLiter  guaiFENesin    Syrup 100 milliGRAM(s) Oral every 6 hours PRN Cough  ondansetron    Tablet 4 milliGRAM(s) Oral every 6 hours PRN Nausea and/or Vomiting HOSPITALIST PROGRESS NOTE    DAVID LUIS  [id number]  83yMale    Patient is a 83y old  Male who presents with a chief complaint of CHF (14 Oct 2018 08:00)      SUBJECTIVE:   Chart reviewed since last visit.  Patient seen and examined at bedside for CHF, MARIBEL, UTI  Feels better in terms of dyspnea, denies cough, chest pain or palpitation.   Nauseous yesterday, improved. No chills or fevers.      OBJECTIVE:  Vital Signs Last 24 Hrs  T(C): 36.4 (14 Oct 2018 15:00), Max: 36.7 (13 Oct 2018 23:18)  T(F): 97.5 (14 Oct 2018 15:00), Max: 98 (13 Oct 2018 23:18)  HR: 77 (14 Oct 2018 15:00) (60 - 82)  BP: 83/55 (14 Oct 2018 15:00) (83/55 - 91/58)   RR: 18 (14 Oct 2018 15:00) (18 - 20)  SpO2: 97% (14 Oct 2018 15:00) (88% - 97%)    PHYSICAL EXAMINATION  General: no acute distress, lying in bed  HEENT: NC 5L in place  NECK: Supple[+]  JVD[-] Carotid bruit[]  CVS: RRR[]  Irregular[+]  S1+S2[+]  PPM[+]  RESP: Fair air entry bilaterally[+] except bases with decreased sounds and bibasilar crackles  No wheeze - improved  GI: Soft[+]  Nondistended[+]   Nontender[+]   Bowel Sounds[+]  Mass[]   HSM[]   Ascites[]  : suprapubic tenderness[+]   CVA Tenderness[]   Felix[-] [place] catheter  MS: FROM[+]   Edema[+]grossly intact   INTEG: Skin is Warm[]  dry[] Lesion[] Decubitus[]  PSYCH: Fair Mood, Affect - fatigued  MONITOR:  CAPILLARY BLOOD GLUCOSE      POCT Blood Glucose.: 146 mg/dL (14 Oct 2018 17:11)  POCT Blood Glucose.: 160 mg/dL (14 Oct 2018 14:04)  POCT Blood Glucose.: 171 mg/dL (14 Oct 2018 12:37)  POCT Blood Glucose.: 227 mg/dL (14 Oct 2018 08:46)  POCT Blood Glucose.: 178 mg/dL (13 Oct 2018 22:43)        I&O's Summary    14 Oct 2018 07:01  -  14 Oct 2018 17:43  --------------------------------------------------------  IN: 701 mL / OUT: 20 mL / NET: 681 mL                            10.1   15.0  )-----------( 140      ( 14 Oct 2018 07:19 )             30.2     PT/INR - ( 14 Oct 2018 07:19 )   PT: 73.1 sec;   INR: 6.40 ratio           10-    134<L>  |  95<L>  |  76.0<H>  ----------------------------<  184<H>  5.3   |  18.0<L>  |  2.91<H>    Ca    7.5<L>      14 Oct 2018 07:19          Urinalysis Basic - ( 13 Oct 2018 21:06 )    Color: Yellow / Appearance: Clear / S.025 / pH: x  Gluc: x / Ketone: Trace  / Bili: Small / Urobili: 1 mg/dL   Blood: x / Protein: 100 mg/dL / Nitrite: Negative   Leuk Esterase: Moderate / RBC: 3-5 /HPF / WBC 0-2   Sq Epi: x / Non Sq Epi: Occasional / Bacteria: Few        Culture:    TTE:    RADIOLOGY  < from: US Kidney and Bladder (10.13.18 @ 12:03) >   EXAM:  US KIDNEYS AND BLADDER                          PROCEDURE DATE:  10/13/2018          INTERPRETATION:  Clinical history: Acute kidney injury.     Findings:  The kidneys are normal in size and echotexture measuring up to   9 and 10.8 cm. in longest sagittal dimension on the right and left   respectively.        There is no evidence of renal stones, perirenal abnormality or   hydronephrosis.      Heterogeneous prostate enlargement. Incompletely distended urinary   bladder.    Incidental bilateral pleural effusions.    IMPRESSION:  Negative Renal Ultrasound.                FEMI MEJIA M.D., ATTENDING RADIOLOGIST  This document has been electronically signed. Oct 13 2018  2:02PM        < end of copied text >      MEDICATIONS  (STANDING):  ALBUTerol    90 MICROgram(s) HFA Inhaler 1 Puff(s) Inhalation every 4 hours  ALBUTerol/ipratropium for Nebulization 3 milliLiter(s) Nebulizer every 6 hours  aspirin enteric coated 81 milliGRAM(s) Oral daily  atorvastatin 40 milliGRAM(s) Oral at bedtime  buDESOnide   0.5 milliGRAM(s) Respule 0.5 milliGRAM(s) Inhalation two times a day  cefTRIAXone   IVPB      dextrose 5%. 1000 milliLiter(s) (50 mL/Hr) IV Continuous <Continuous>  dextrose 50% Injectable 12.5 Gram(s) IV Push once  dextrose 50% Injectable 25 Gram(s) IV Push once  dextrose 50% Injectable 25 Gram(s) IV Push once  insulin lispro (HumaLOG) corrective regimen sliding scale   SubCutaneous three times a day before meals  mirtazapine 7.5 milliGRAM(s) Oral at bedtime  multivitamin/minerals 1 Tablet(s) Oral daily  pantoprazole    Tablet 40 milliGRAM(s) Oral before breakfast  sodium chloride 0.9%. 1000 milliLiter(s) (83 mL/Hr) IV Continuous <Continuous>  tiotropium 18 MICROgram(s) Capsule 1 Capsule(s) Inhalation daily      MEDICATIONS  (PRN):  dextrose 40% Gel 15 Gram(s) Oral once PRN Blood Glucose LESS THAN 70 milliGRAM(s)/deciLiter  glucagon  Injectable 1 milliGRAM(s) IntraMuscular once PRN Glucose <70 milliGRAM(s)/deciLiter  guaiFENesin    Syrup 100 milliGRAM(s) Oral every 6 hours PRN Cough  ondansetron    Tablet 4 milliGRAM(s) Oral every 6 hours PRN Nausea and/or Vomiting

## 2018-10-14 NOTE — PROGRESS NOTE ADULT - PROBLEM SELECTOR PLAN 1
Continue to monitor  Supplemental Oxygen  Nebs  Treat underlying CHF Continue to monitor  Supplemental Oxygen  Nebs  Appears to be back to baseline

## 2018-10-14 NOTE — PROGRESS NOTE ADULT - PROBLEM SELECTOR PLAN 8
Continue Statin  No ASA as recent GIB and on Coumadin for MVR

## 2018-10-14 NOTE — PROGRESS NOTE ADULT - PROBLEM SELECTOR PLAN 6
Continue SSI with Accuchecks  Discontinue Lyrica secondary to MARIBEL
Continue SSI with Accuchecks  Continue Lyrica
Continue SSI with Accuchecks  Discontinue Lyrica secondary to MARIBEL

## 2018-10-14 NOTE — PROGRESS NOTE ADULT - ASSESSMENT
MARIBEL/Likely some CKD - now creatinine is worsening cause inclear ; Has low BP which may contribute to MARIBEL  HAs good EF 75% and only mild PAH but now CXR c/w CHF/opacities - difficult to ascribe  may need another ECHO  Recd IVFs, shall give only 1 dose of Albun=mib to improve intravascular volume and if it does n't help then  t/c Dobutrex etc depending on new ECHO findings  No obv sepsis/infection to explain low BP ; may need CT chest NC  shall hold Protonix as may cause MARIBEL sometimes  Outpatient meds reviewed, no obv NTx meds  Labs am

## 2018-10-14 NOTE — CONSULT NOTE ADULT - SUBJECTIVE AND OBJECTIVE BOX
E&M of anemia.      DAVID LUIS  83y  Male  [id number]      Patient is a 83y old  Male who presents with a chief complaint of dyspnea (14 Oct 2018 17:41)    HPI:  83M with a PMHx of Diastolic CHF, DM, HTN, Chronic Anemia, COPD on Home O2, S/P MVR on Coumadin.  Recent hospitalization for CHF and hypoxia. Now presents for increasing weakness and lethargy. recently DC from [facility]. His wife describes a state in over all decline, with poor PO intake/Appetite. He denies Chest pain, No nausea/vomiting, fevers, chills, cough  or diarrhea.   Found to have O2 sats in the 70s upon arrival with mild confusion and dyspnea. He was placed on HF NC OF with improvement in O2 sat to the low 90s.     Of NOte he was recently placed on Salt tablets at the Rehab center without diuretics (11 Oct 2018 14:22)    PAST MEDICAL & SURGICAL HISTORY:  COPD (chronic obstructive pulmonary disease)  CHF (congestive heart failure)  Chronic pain  Insomnia  Diabetes  Mitral valve replaced  Pacemaker  S/P MVR (mitral valve repair)  S/P CABG (coronary artery bypass graft)    FAMILY HISTORY:  No pertinent family history in first degree relatives    REVIEW OF SYSTEMS      General:	    Skin/Breast:  	  Ophthalmologic:  	  ENMT:	    Respiratory and Thorax:  	  Cardiovascular:	    Gastrointestinal:	    Genitourinary:	    Musculoskeletal:	    Neurological:	    Psychiatric:	    Hematology/Lymphatics:	    Endocrine:	    Allergic/Immunologic:	    PHYSICAL EXAM:      Constitutional:    Eyes:    ENMT:    Neck:    Breasts:    Back:    Respiratory:    Cardiovascular:    Gastrointestinal:    Genitourinary:    Rectal:    Extremities:    Vascular:    Neurological:    Skin:    Lymph Nodes:    Musculoskeletal:    Psychiatric:        CBC Full  -  ( 14 Oct 2018 07:19 )  WBC Count : 15.0 K/uL  Hemoglobin : 10.1 g/dL  Hematocrit : 30.2 %  Platelet Count - Automated : 140 K/uL  Mean Cell Volume : 83.7 fl  Mean Cell Hemoglobin : 28.0 pg  Mean Cell Hemoglobin Concentration : 33.4 g/dL  Auto Neutrophil # : x  Auto Lymphocyte # : x  Auto Monocyte # : x  Auto Eosinophil # : x  Auto Basophil # : x  Auto Neutrophil % : x  Auto Lymphocyte % : x  Auto Monocyte % : x  Auto Eosinophil % : x  Auto Basophil % : x    PT/INR - ( 14 Oct 2018 07:19 )   PT: 73.1 sec;   INR: 6.40 ratio           14 Oct 2018 07:19    134    |  95     |  76.0   ----------------------------<  184    5.3     |  18.0   |  2.91     Ca    7.5        14 Oct 2018 07:19        Assessment:  Anemia of chronic disease and CKD  Mild, reactive leukocytosis.    Suggest: treat with Procrit.  Hold procrit for a hemoglobin higher than 11G E&M of anemia.      DAVID LUIS  83y  Male  [id number]      Patient is a 83y old  Male who presents with a chief complaint of dyspnea (14 Oct 2018 17:41)    HPI:  83M with a PMHx of Diastolic CHF, DM, HTN, Chronic Anemia, COPD on Home O2, S/P MVR on Coumadin.  Recent hospitalization for CHF and hypoxia. Now presents for increasing weakness and lethargy. recently DC from [facility]. His wife describes a state in over all decline, with poor PO intake/Appetite. He denies Chest pain, No nausea/vomiting, fevers, chills, cough  or diarrhea.   Found to have O2 sats in the 70s upon arrival with mild confusion and dyspnea. He was placed on HF NC OF with improvement in O2 sat to the low 90s.     Of NOte he was recently placed on Salt tablets at the Rehab center without diuretics (11 Oct 2018 14:22)    PAST MEDICAL & SURGICAL HISTORY:  COPD (chronic obstructive pulmonary disease)  CHF (congestive heart failure)  Chronic pain  Insomnia  Diabetes  Mitral valve replaced  Pacemaker  S/P MVR (mitral valve repair)  S/P CABG (coronary artery bypass graft)    FAMILY HISTORY:  No pertinent family history in first degree relatives    REVIEW OF SYSTEMS      General:	   Weakness    Skin/Breast:  Neg    	  ENMT:	Neg.    Respiratory and Thorax:   Neg.  Mild dyspnea	  Cardiovascular:	  No CP  Gastrointestinal:	  Neg  Genitourinary:	  Neg  Musculoskeletal:	  Neg  Neurological:	  Neg  Psychiatric:	  No anxiety or depression.  Hematology/Lymphatics:	  No bleeding  No LN enlargement.    PHYSICAL EXAM:  Alert and oriented  In NAD  Mild pallor  No jaundice  No LNs in the neck.  Abd: Soft and non-tender  No HSM  Exts: No CCE or evidence of DVT on the bedside evaluation.    CBC Full  -  ( 14 Oct 2018 07:19 )  WBC Count : 15.0 K/uL  Hemoglobin : 10.1 g/dL  Hematocrit : 30.2 %  Platelet Count - Automated : 140 K/uL  Mean Cell Volume : 83.7 fl  Mean Cell Hemoglobin : 28.0 pg  Mean Cell Hemoglobin Concentration : 33.4 g/dL  Auto Neutrophil # : x  Auto Lymphocyte # : x  Auto Monocyte # : x  Auto Eosinophil # : x  Auto Basophil # : x  Auto Neutrophil % : x  Auto Lymphocyte % : x  Auto Monocyte % : x  Auto Eosinophil % : x  Auto Basophil % : x    PT/INR - ( 14 Oct 2018 07:19 )   PT: 73.1 sec;   INR: 6.40 ratio           14 Oct 2018 07:19    134    |  95     |  76.0   ----------------------------<  184    5.3     |  18.0   |  2.91     Ca    7.5        14 Oct 2018 07:19        Assessment:  Anemia of chronic disease and CKD  Mild, reactive leukocytosis.    Suggest: treat with Procrit.  Hold Procrit for a hemoglobin higher than 11G      Thank you.

## 2018-10-14 NOTE — PROGRESS NOTE ADULT - PROBLEM SELECTOR PLAN 3
Daily weights, I/O  Renal Sonogram  Monitor SCr  Hold Lasix, Lyrica.  Avoid Nephrotoxin  Nephrology consult - Dr Jeffrey Daily weights, I/O  Renal Sonogram  Monitor SCr  Hold Lasix, Lyrica.  IVF, bolus  Avoid Nephrotoxin  Nephrology consult - Dr Jeffrey

## 2018-10-14 NOTE — PROGRESS NOTE ADULT - SUBJECTIVE AND OBJECTIVE BOX
Patient seen and examined    I&O's Summary    14 Oct 2018 07:01  -  14 Oct 2018 18:52  --------------------------------------------------------  IN: 701 mL / OUT: 20 mL / NET: 681 mL    doesn't feel any better  ate poorly  wife present    REVIEW OF SYSTEMS:    CONSTITUTIONAL: No F/C  RESPIRATORY: No cough,  No SOB  CARDIOVASCULAR: No CP/palpitations,    GASTROINTESTINAL: No abdominal pain  or NVD   GENITOURINARY: No UTI sx; UO very poor - in ulloa bag  NEUROLOGICAL: No headaches, NO wk/numbness  MUSCULOSKELETAL:   EXTREMITIES : no swelling,    Vital Signs Last 24 Hrs  T(C): 36.4 (14 Oct 2018 15:00), Max: 36.7 (13 Oct 2018 23:18)  T(F): 97.5 (14 Oct 2018 15:00), Max: 98 (13 Oct 2018 23:18)  HR: 61 (14 Oct 2018 16:18) (60 - 82)  BP: 83/55 (14 Oct 2018 15:00) (83/55 - 91/58)  BP(mean): --  RR: 18 (14 Oct 2018 15:00) (18 - 20)  SpO2: 94% (14 Oct 2018 16:18) (88% - 97%)    PHYSICAL EXAM:    GENERAL: NAD, sitting quietly with eyes closed  EYES:  conjunctiva and sclera clear  NECK: Supple, No JVD/Bruit  NERVOUS SYSTEM:  A/O, follows simple VC and moving all extr  CHEST:  No rales or rhonchi  HEART:  RRR, gr 2murmur  ABDOMEN: Soft, NT/ND BS+  EXTREMITIES:  No Edema;  SKIN: No rashes    LABS:                          10.1   15.0  )-----------( 140      ( 14 Oct 2018 07:19 )             30.2     10-14    134<L>  |  95<L>  |  76.0<H>  ----------------------------<  184<H>  5.3   |  18.0<L>  |  2.91<H>    Ca    7.5<L>      14 Oct 2018 07:19        MEDICATIONS  (STANDING):  ALBUTerol    90 MICROgram(s) HFA Inhaler  ALBUTerol/ipratropium for Nebulization  aspirin enteric coated  atorvastatin  buDESOnide   0.5 milliGRAM(s) Respule  cefTRIAXone   IVPB  dextrose 40% Gel PRN  dextrose 5%.  dextrose 50% Injectable  dextrose 50% Injectable  dextrose 50% Injectable  glucagon  Injectable PRN  guaiFENesin    Syrup PRN  insulin lispro (HumaLOG) corrective regimen sliding scale  mirtazapine  multivitamin/minerals  ondansetron    Tablet PRN  pantoprazole    Tablet  sodium chloride 0.9% Bolus  sodium chloride 0.9%.  tiotropium 18 MICROgram(s) Capsule

## 2018-10-14 NOTE — PROGRESS NOTE ADULT - SUBJECTIVE AND OBJECTIVE BOX
[facility], [facility]                                                    [address], [address], [place] [address]                                                         PHONE: [phone]    FAX: [phone] [address], [address], [place] [address]                                                 PHONE: [phone]    FAX: [phone]  *******************************************************************************  cc: confusion. SOB    HPI:    83M with hx of chronic diastolic CHF, DM, HTN, anemia, COPD on home O2 therapy.  chronic anemia.  Extensive cardiac hx with CABG 20 yrs ago.  Mechanical MVR IN 2012.  S/P PPM implant.  Recent hospitalization 2/2 CHF and hypoxia.  Pt presents with confusion and dyspnea.  Volume overloaded.  Denies CP.  No fever, chills or constitutional symptoms    Overnight events/Subjective Assessment: no new complaints. still with cough    No Known Allergies    Patient History:    Past Medical History:  CHF (congestive heart failure)    Chronic pain    COPD (chronic obstructive pulmonary disease)    Diabetes    Insomnia    Mitral valve replaced    Pacemaker.     Past Surgical History:  S/P CABG (coronary artery bypass graft)    S/P MVR (mitral valve repair).     Social History:  Social History (marital status, living situation, occupation, tobacco use, alcohol and drug use, and sexual history): Former Smoker  No ETOH    Family Hx: no pertinent family hx in 1st degree relatives    REVIEW OF SYSTEMS: As above. No other pertinent ROS    MEDICATIONS  (STANDING):  ALBUTerol    90 MICROgram(s) HFA Inhaler 1 Puff(s) Inhalation every 4 hours  ALBUTerol/ipratropium for Nebulization 3 milliLiter(s) Nebulizer every 6 hours  atorvastatin 40 milliGRAM(s) Oral at bedtime  buDESOnide   0.5 milliGRAM(s) Respule 0.5 milliGRAM(s) Inhalation two times a day  dextrose 5%. 1000 milliLiter(s) (50 mL/Hr) IV Continuous <Continuous>  dextrose 50% Injectable 12.5 Gram(s) IV Push once  dextrose 50% Injectable 25 Gram(s) IV Push once  dextrose 50% Injectable 25 Gram(s) IV Push once  insulin lispro (HumaLOG) corrective regimen sliding scale   SubCutaneous three times a day before meals  mirtazapine 7.5 milliGRAM(s) Oral at bedtime  multivitamin/minerals 1 Tablet(s) Oral daily  pantoprazole    Tablet 40 milliGRAM(s) Oral before breakfast  tiotropium 18 MICROgram(s) Capsule 1 Capsule(s) Inhalation daily    MEDICATIONS  (PRN):  dextrose 40% Gel 15 Gram(s) Oral once PRN Blood Glucose LESS THAN 70 milliGRAM(s)/deciLiter  glucagon  Injectable 1 milliGRAM(s) IntraMuscular once PRN Glucose <70 milliGRAM(s)/deciLiter  guaiFENesin    Syrup 100 milliGRAM(s) Oral every 6 hours PRN Cough  ondansetron    Tablet 4 milliGRAM(s) Oral every 6 hours PRN Nausea and/or Vomiting      Vital Signs Last 24 Hrs  T(C): 36.7 (13 Oct 2018 23:18), Max: 36.7 (13 Oct 2018 23:18)  T(F): 98 (13 Oct 2018 23:18), Max: 98 (13 Oct 2018 23:18)  HR: 72 (14 Oct 2018 03:57) (63 - 82)  BP: 91/58 (14 Oct 2018 03:57) (87/53 - 91/58)  BP(mean): --  RR: 18 (14 Oct 2018 01:43) (18 - 19)  SpO2: 93% (14 Oct 2018 03:11) (88% - 97%)    I&O's Detail    I&O's Summary          PHYSICAL EXAM:  General: Appears well developed, well nourished, no acute distress  HEENT: Head: normocephalic, atraumatic  Eyes: Pupils equal and reactive  Neck: Supple, no carotid bruit, no JVD, no HJR  CARDIOVASCULAR: Normal S1 and S2, no murmur, rub, or gallop  LUNGS: Clear to auscultation bilaterally, no rales, rhonchi or wheeze  ABDOMEN: Soft, nontender, non-distended, positive bowel sounds, no mass or bruit  EXTREMITIES: No edema, distal pulses WNL  SKIN: Warm and dry with normal turgor  NEURO: Alert & oriented x 3, grossly intact  PSYCH: normal mood and affect            LABS:                        9.9    12.3  )-----------( 145      ( 13 Oct 2018 07:24 )             31.1     10-14    134<L>  |  95<L>  |  76.0<H>  ----------------------------<  184<H>  5.3   |  18.0<L>  |  2.91<H>    Ca    7.5<L>      14 Oct 2018 07:19          PT/INR - ( 13 Oct 2018 07:24 )   PT: 85.1 sec;   INR: 7.43 ratio         PTT - ( 12 Oct 2018 13:00 )  PTT:43.8 sec  Serum Pro-Brain Natriuretic Peptide: 99321 pg/mL (10-11 @ 12:24)  serum  Lipids:   Hemoglobin A1C, Whole Blood: 5.3 % (10-12 @ 13:00)        RADIOLOGY & ADDITIONAL STUDIES:      ECHO: < from: TTE Echo Complete w/Doppler (08.20.18 @ 11:10) >  Summary:   1. Technically difficult study.   2. Hyperdynamic global left ventricular systolic function. Left   ventricular ejection fraction, by visual estimation, is >75%.   3. Moderately increased LV wall thickness.   4. Normal RV size and systolic function.   5. A mechanical valve is present in the mitral position. Normal function.   6. Mild aortic regurgitation.   7. Moderate tricuspid regurgitation.   8. Mild pulmonic insufficiency.   9. Dilated aorta and pulmonary artery.  10. Estimated pulmonary artery systolic pressure is 42.9 mmHg assuming a   right atrial pressure of 3 mmHg, which is consistent with mild pulmonary   hypertension.  11. There is no evidence of pericardial effusion.  12. ** No prior echocardiograms available for comparison.    < end of copied text >    < from: CT Chest No Cont (10.11.18 @ 13:29) >  IMPRESSION:    Cardiomegaly.  Bilateral moderate pleural effusions.  Diffuse interstitial pulmonary infiltrates with groundglass opacities in   the upper lobeof concerning for a pulmonary edema and effusion of   cardiac origin..    < end of copied text >      ASSESSMENT AND PLAN:  In summary, DAVID LUIS is a 83y Male with past medical history significant for hx of chronic diastolic CHF, DM, HTN, anemia, COPD on home O2 therapy.  chronic anemia.  Extensive cardiac hx with CABG 20 yrs ago.  Mechanical MVR IN 2012.  S/P PPM implant.  Cardio MEMS  Recent hospitalization 2/2 CHF and hypoxia.  Pt presents with confusion and dyspnea.  Volume overloaded. Anasarca  Denies CP.  No fever, chills or constitutional symptoms    - Acute on chronic severe diastolic congestive heart failure.  Pt had been given salt tabs without diuretic.  Severe increase of BNP noted. Responded to diuresis. Parameters on meds due to tendency to low BP's. Diuretic now held due to azotemia and CRI. Resume low dose diuretic when stabilized. Pt with over diuresis. CT of chest with bilat pleural effusions and diffuse interstitial pulmonary infiltrates with ground-glass opacities in the upper lobe. Acute on chronic severe diastolic CHF persists c/b CONNOR/CKI.  Difficult balance between diuresis and renal function.    - Acute renal failure. Chronic renal insufficiency.  Agree likely cardiorenal. Continue to monitor. Renal appreciated.    - CAD.  Past CABG. PPM. No evidence of ACS. Medical management for now. Maintain ASA    - Mechanical MVR.  SBE prophylaxis for procedures that entail bacteremia.  Anticoagulation per medical. INR 3 range    - CAF. Anticoagulation for MVR and AF    - HTN. BP is to low side    - COPD. O2. Pt maintains O2 as an outpt    - PPM. outpt fu with Dr Garcia on DC    - poor po intake. Encourage adequate diet.    - May need PT evaluation/rehab on DC        Sona Ruiz MD [facility], [facility]                                                    [address], [address], [place] [address]                                                         PHONE: [phone]    FAX: [phone] [address], [address], [place] [address]                                                 PHONE: [phone]    FAX: [phone]  *******************************************************************************  cc: confusion. SOB    HPI:    83M with hx of chronic diastolic CHF, DM, HTN, anemia, COPD on home O2 therapy.  chronic anemia.  Extensive cardiac hx with CABG 20 yrs ago.  Mechanical MVR IN 2012.  S/P PPM implant.  Recent hospitalization 2/2 CHF and hypoxia.  Pt presents with confusion and dyspnea.  Volume overloaded.  Denies CP.  No fever, chills or constitutional symptoms    Overnight events/Subjective Assessment: no new complaints. still with cough    No Known Allergies    Patient History:    Past Medical History:  CHF (congestive heart failure)    Chronic pain    COPD (chronic obstructive pulmonary disease)    Diabetes    Insomnia    Mitral valve replaced    Pacemaker.     Past Surgical History:  S/P CABG (coronary artery bypass graft)    S/P MVR (mitral valve repair).     Social History:  Social History (marital status, living situation, occupation, tobacco use, alcohol and drug use, and sexual history): Former Smoker  No ETOH    Family Hx: no pertinent family hx in 1st degree relatives    REVIEW OF SYSTEMS: As above. No other pertinent ROS    MEDICATIONS  (STANDING):  ALBUTerol    90 MICROgram(s) HFA Inhaler 1 Puff(s) Inhalation every 4 hours  ALBUTerol/ipratropium for Nebulization 3 milliLiter(s) Nebulizer every 6 hours  atorvastatin 40 milliGRAM(s) Oral at bedtime  buDESOnide   0.5 milliGRAM(s) Respule 0.5 milliGRAM(s) Inhalation two times a day  dextrose 5%. 1000 milliLiter(s) (50 mL/Hr) IV Continuous <Continuous>  dextrose 50% Injectable 12.5 Gram(s) IV Push once  dextrose 50% Injectable 25 Gram(s) IV Push once  dextrose 50% Injectable 25 Gram(s) IV Push once  insulin lispro (HumaLOG) corrective regimen sliding scale   SubCutaneous three times a day before meals  mirtazapine 7.5 milliGRAM(s) Oral at bedtime  multivitamin/minerals 1 Tablet(s) Oral daily  pantoprazole    Tablet 40 milliGRAM(s) Oral before breakfast  tiotropium 18 MICROgram(s) Capsule 1 Capsule(s) Inhalation daily    MEDICATIONS  (PRN):  dextrose 40% Gel 15 Gram(s) Oral once PRN Blood Glucose LESS THAN 70 milliGRAM(s)/deciLiter  glucagon  Injectable 1 milliGRAM(s) IntraMuscular once PRN Glucose <70 milliGRAM(s)/deciLiter  guaiFENesin    Syrup 100 milliGRAM(s) Oral every 6 hours PRN Cough  ondansetron    Tablet 4 milliGRAM(s) Oral every 6 hours PRN Nausea and/or Vomiting      Vital Signs Last 24 Hrs  T(C): 36.7 (13 Oct 2018 23:18), Max: 36.7 (13 Oct 2018 23:18)  T(F): 98 (13 Oct 2018 23:18), Max: 98 (13 Oct 2018 23:18)  HR: 72 (14 Oct 2018 03:57) (63 - 82)  BP: 91/58 (14 Oct 2018 03:57) (87/53 - 91/58)  BP(mean): --  RR: 18 (14 Oct 2018 01:43) (18 - 19)  SpO2: 93% (14 Oct 2018 03:11) (88% - 97%)    I&O's Detail    I&O's Summary          PHYSICAL EXAM:  General: Appears well developed, well nourished, no acute distress  HEENT: Head: normocephalic, atraumatic  Eyes: Pupils equal and reactive  Neck: Supple, no carotid bruit, no JVD, no HJR  CARDIOVASCULAR: Normal S1 and S2, no murmur, rub, or gallop  LUNGS: Clear to auscultation bilaterally, no rales, rhonchi or wheeze  ABDOMEN: Soft, nontender, non-distended, positive bowel sounds, no mass or bruit  EXTREMITIES: No edema, distal pulses WNL  SKIN: Warm and dry with normal turgor  NEURO: Alert & oriented x 3, grossly intact  PSYCH: normal mood and affect            LABS:                        9.9    12.3  )-----------( 145      ( 13 Oct 2018 07:24 )             31.1     10-14    134<L>  |  95<L>  |  76.0<H>  ----------------------------<  184<H>  5.3   |  18.0<L>  |  2.91<H>    Ca    7.5<L>      14 Oct 2018 07:19          PT/INR - ( 13 Oct 2018 07:24 )   PT: 85.1 sec;   INR: 7.43 ratio         PTT - ( 12 Oct 2018 13:00 )  PTT:43.8 sec  Serum Pro-Brain Natriuretic Peptide: 19315 pg/mL (10-11 @ 12:24)  serum  Lipids:   Hemoglobin A1C, Whole Blood: 5.3 % (10-12 @ 13:00)        RADIOLOGY & ADDITIONAL STUDIES:      ECHO: < from: TTE Echo Complete w/Doppler (08.20.18 @ 11:10) >  Summary:   1. Technically difficult study.   2. Hyperdynamic global left ventricular systolic function. Left   ventricular ejection fraction, by visual estimation, is >75%.   3. Moderately increased LV wall thickness.   4. Normal RV size and systolic function.   5. A mechanical valve is present in the mitral position. Normal function.   6. Mild aortic regurgitation.   7. Moderate tricuspid regurgitation.   8. Mild pulmonic insufficiency.   9. Dilated aorta and pulmonary artery.  10. Estimated pulmonary artery systolic pressure is 42.9 mmHg assuming a   right atrial pressure of 3 mmHg, which is consistent with mild pulmonary   hypertension.  11. There is no evidence of pericardial effusion.  12. ** No prior echocardiograms available for comparison.    < end of copied text >    < from: CT Chest No Cont (10.11.18 @ 13:29) >  IMPRESSION:    Cardiomegaly.  Bilateral moderate pleural effusions.  Diffuse interstitial pulmonary infiltrates with groundglass opacities in   the upper lobeof concerning for a pulmonary edema and effusion of   cardiac origin..    < end of copied text >      ASSESSMENT AND PLAN:  In summary, DAVID LUIS is a 83y Male with past medical history significant for hx of chronic diastolic CHF, DM, HTN, anemia, COPD on home O2 therapy.  chronic anemia.  Extensive cardiac hx with CABG 20 yrs ago.  Mechanical MVR IN 2012.  S/P PPM implant.  Cardio MEMS  Recent hospitalization 2/2 CHF and hypoxia.  Pt presents with confusion and dyspnea.  Volume overloaded. Anasarca  Denies CP.  No fever, chills or constitutional symptoms    - Acute on chronic severe diastolic congestive heart failure.  Pt had been given salt tabs without diuretic.  Severe increase of BNP noted. Responded to diuresis. Parameters on meds due to tendency to low BP's. Diuretic now held due to azotemia and CRI. Resume low dose diuretic when stabilized. Pt with over diuresis. CT of chest with bilat pleural effusions and diffuse interstitial pulmonary infiltrates with ground-glass opacities in the upper lobe. Acute on chronic severe diastolic CHF persists c/b CONNOR/CKI.  Difficult balance between diuresis and renal function.    - Acute renal failure. Chronic renal insufficiency.  Agree likely cardiorenal. Continue to monitor. Renal appreciated.    - CAD.  Past CABG. PPM. No evidence of ACS. Medical management for now. Maintain ASA    - Mechanical MVR.  SBE prophylaxis for procedures that entail bacteremia.  Anticoagulation per medical. INR 3 range. Currently hypercoagulable with INR 6.4. No evidence of bleeding. Continue to monitor.    - CAF. Anticoagulation for MVR and AF    - HTN. BP is to low side    - COPD. O2. Pt maintains O2 as an outpt    - PPM. outpt fu with Dr Garcia on DC    - poor po intake. Encourage adequate diet.    - May need PT evaluation/rehab on DC        Sona Ruiz MD

## 2018-10-14 NOTE — PROVIDER CONTACT NOTE (CRITICAL VALUE NOTIFICATION) - ACTION/TREATMENT ORDERED:
call out to Dr. Keyes     awaiting retn call back call out to Dr. Keyes     awaiting retn call back   [id number]:   Dr. Keyes aware.   no orders at this time

## 2018-10-14 NOTE — PROGRESS NOTE ADULT - PROBLEM SELECTOR PROBLEM 8
Coronary artery disease without angina pectoris, unspecified vessel or lesion type, unspecified whether native or transplanted heart

## 2018-10-14 NOTE — PROGRESS NOTE ADULT - PROBLEM SELECTOR PLAN 2
Fluid restriction  Daily weights I/O  Hold Lasix given bump in SCr  Recent TTE reviewed - intact EF  Cardiology appreciated
Fluid restriction  Daily weights I/O  Hold Lasix given bump in SCr  Recent TTE reviewed - intact EF  Cardiology appreciated
Fluid restriction  Daily weights I/O  Change Lasix to once daily  Recent TTE reveiwed  Cardiology appreciated

## 2018-10-14 NOTE — PROGRESS NOTE ADULT - ASSESSMENT
83 year old male with PMH HTN, T2DM, COPD on home O2, CAD s/p CABG, chronic AF, s/p PPM s/p MVR on Coumadin, Chronic anemia, recent admission for GIB likely CKD3 presented with progressive dyspnea and fatigue and admitted for acute on chronic hypoxic respiratory failure secondary to acutely decompensated CHFpEF secondary to salt tablets in Nursing Home.  Mild leukocytosis, anemia and elevated SCr at admission BNP 22697, Tn 0.05, Chest X-Ray with congestive changes and EKG with AF.    Patient required NIPPV in ER and admitted to MICU, improved overnight with IV Lasix - now no longer on NIPPV. CT scan chest with pleural effusion and edema. Downgraded to medical service for further management.    Respiratory failure has improved though still requiring higher than home O2. Doubt COPD exacerbation.    MARIBEL, likely secondary to diuretics, possible CRS. 83 year old male with PMH HTN, T2DM, COPD on home O2, CAD s/p CABG, chronic AF, s/p PPM s/p MVR on Coumadin, Chronic anemia, recent admission for GIB likely CKD3 presented with progressive dyspnea and fatigue and admitted for acute on chronic hypoxic respiratory failure secondary to acutely decompensated CHFpEF secondary to salt tablets in Nursing Home.  Mild leukocytosis, anemia and elevated SCr at admission BNP 75214, Tn 0.05, Chest X-Ray with congestive changes and EKG with AF.    Patient required NIPPV in ER and admitted to MICU, improved overnight with IV Lasix - now no longer on NIPPV. CT scan chest with pleural effusion and edema. Downgraded to medical service for further management.    Respiratory failure has improved though still requiring higher than home O2. Doubt COPD exacerbation.    MARIBEL, likely secondary to diuretics, possible CRS. Worsening SCr. Borderline hypotension.     Leukocytosis, UA consistent with UTI.    Supratherapeutic INR - no evidence of bleeding.

## 2018-10-15 VITALS — HEART RATE: 31 BPM

## 2018-10-15 DIAGNOSIS — E87.5 HYPERKALEMIA: ICD-10-CM

## 2018-10-15 DIAGNOSIS — E87.2 ACIDOSIS: ICD-10-CM

## 2018-10-15 DIAGNOSIS — I50.1 LEFT VENTRICULAR FAILURE, UNSPECIFIED: ICD-10-CM

## 2018-10-15 DIAGNOSIS — J96.00 ACUTE RESPIRATORY FAILURE, UNSPECIFIED WHETHER WITH HYPOXIA OR HYPERCAPNIA: ICD-10-CM

## 2018-10-15 LAB
-  STREPTOCOCCUS SP. (NOT GRP A, B OR S PNEUMONIAE): SIGNIFICANT CHANGE UP
ALBUMIN SERPL ELPH-MCNC: 2.5 G/DL — LOW (ref 3.3–5.2)
ALP SERPL-CCNC: 114 U/L — SIGNIFICANT CHANGE UP (ref 40–120)
ALT FLD-CCNC: 633 U/L — HIGH
ANION GAP SERPL CALC-SCNC: 32 MMOL/L — HIGH (ref 5–17)
ANISOCYTOSIS BLD QL: SLIGHT — SIGNIFICANT CHANGE UP
APTT BLD: 59.6 SEC — HIGH (ref 27.5–37.4)
AST SERPL-CCNC: 1057 U/L — HIGH
BASOPHILS # BLD AUTO: 0 K/UL — SIGNIFICANT CHANGE UP (ref 0–0.2)
BASOPHILS NFR BLD AUTO: 0 % — SIGNIFICANT CHANGE UP (ref 0–2)
BILIRUB SERPL-MCNC: 4.2 MG/DL — HIGH (ref 0.4–2)
BLD GP AB SCN SERPL QL: SIGNIFICANT CHANGE UP
BUN SERPL-MCNC: 88 MG/DL — HIGH (ref 8–20)
BURR CELLS BLD QL SMEAR: PRESENT — SIGNIFICANT CHANGE UP
CALCIUM SERPL-MCNC: 6.8 MG/DL — LOW (ref 8.6–10.2)
CHLORIDE SERPL-SCNC: 95 MMOL/L — LOW (ref 98–107)
CO2 SERPL-SCNC: 10 MMOL/L — CRITICAL LOW (ref 22–29)
CREAT SERPL-MCNC: 3.75 MG/DL — HIGH (ref 0.5–1.3)
ELLIPTOCYTES BLD QL SMEAR: SLIGHT — SIGNIFICANT CHANGE UP
EOSINOPHIL # BLD AUTO: 0 K/UL — SIGNIFICANT CHANGE UP (ref 0–0.5)
EOSINOPHIL NFR BLD AUTO: 1 % — SIGNIFICANT CHANGE UP (ref 0–6)
GAS PNL BLDA: SIGNIFICANT CHANGE UP
GLUCOSE BLDC GLUCOMTR-MCNC: 93 MG/DL — SIGNIFICANT CHANGE UP (ref 70–99)
GLUCOSE SERPL-MCNC: 151 MG/DL — HIGH (ref 70–115)
HCT VFR BLD CALC: 27.1 % — LOW (ref 42–52)
HGB BLD-MCNC: 8.4 G/DL — LOW (ref 14–18)
HYPOCHROMIA BLD QL: SLIGHT — SIGNIFICANT CHANGE UP
INR BLD: >15 RATIO — CRITICAL HIGH (ref 0.88–1.16)
LYMPHOCYTES # BLD AUTO: 0.6 K/UL — LOW (ref 1–4.8)
LYMPHOCYTES # BLD AUTO: 2 % — LOW (ref 20–55)
MACROCYTES BLD QL: SLIGHT — SIGNIFICANT CHANGE UP
MCHC RBC-ENTMCNC: 27.9 PG — SIGNIFICANT CHANGE UP (ref 27–31)
MCHC RBC-ENTMCNC: 31 G/DL — LOW (ref 32–36)
MCV RBC AUTO: 90 FL — SIGNIFICANT CHANGE UP (ref 80–94)
METHOD TYPE: SIGNIFICANT CHANGE UP
MICROCYTES BLD QL: SLIGHT — SIGNIFICANT CHANGE UP
MONOCYTES # BLD AUTO: 0.8 K/UL — SIGNIFICANT CHANGE UP (ref 0–0.8)
MONOCYTES NFR BLD AUTO: 5 % — SIGNIFICANT CHANGE UP (ref 3–10)
NEUTROPHILS # BLD AUTO: 13 K/UL — HIGH (ref 1.8–8)
NEUTROPHILS NFR BLD AUTO: 91 % — HIGH (ref 37–73)
OVALOCYTES BLD QL SMEAR: SLIGHT — SIGNIFICANT CHANGE UP
PLAT MORPH BLD: NORMAL — SIGNIFICANT CHANGE UP
PLATELET # BLD AUTO: 86 K/UL — LOW (ref 150–400)
POIKILOCYTOSIS BLD QL AUTO: SLIGHT — SIGNIFICANT CHANGE UP
POLYCHROMASIA BLD QL SMEAR: SLIGHT — SIGNIFICANT CHANGE UP
POTASSIUM SERPL-MCNC: 6.3 MMOL/L — CRITICAL HIGH (ref 3.5–5.3)
POTASSIUM SERPL-SCNC: 6.3 MMOL/L — CRITICAL HIGH (ref 3.5–5.3)
PROMYELOCYTES # FLD: 1 % — HIGH (ref 0–0)
PROT SERPL-MCNC: 6 G/DL — LOW (ref 6.6–8.7)
PROTHROM AB SERPL-ACNC: 183.5 SEC — HIGH (ref 9.8–12.7)
RBC # BLD: 3.01 M/UL — LOW (ref 4.6–6.2)
RBC # FLD: 20.3 % — HIGH (ref 11–15.6)
RBC BLD AUTO: ABNORMAL
SODIUM SERPL-SCNC: 137 MMOL/L — SIGNIFICANT CHANGE UP (ref 135–145)
TYPE + AB SCN PNL BLD: SIGNIFICANT CHANGE UP
WBC # BLD: 15 K/UL — HIGH (ref 4.8–10.8)
WBC # FLD AUTO: 15 K/UL — HIGH (ref 4.8–10.8)

## 2018-10-15 PROCEDURE — 71045 X-RAY EXAM CHEST 1 VIEW: CPT | Mod: 26

## 2018-10-15 RX ORDER — NOREPINEPHRINE BITARTRATE/D5W 8 MG/250ML
0.05 PLASTIC BAG, INJECTION (ML) INTRAVENOUS
Qty: 8 | Refills: 0 | Status: DISCONTINUED | OUTPATIENT
Start: 2018-10-15 | End: 2018-10-15

## 2018-10-15 RX ORDER — CALCIUM GLUCONATE 100 MG/ML
1 VIAL (ML) INTRAVENOUS DAILY
Qty: 0 | Refills: 0 | Status: DISCONTINUED | OUTPATIENT
Start: 2018-10-15 | End: 2018-10-15

## 2018-10-15 RX ORDER — LACTULOSE 10 G/15ML
20 SOLUTION ORAL ONCE
Qty: 0 | Refills: 0 | Status: COMPLETED | OUTPATIENT
Start: 2018-10-15 | End: 2018-10-15

## 2018-10-15 RX ORDER — DEXTROSE 50 % IN WATER 50 %
50 SYRINGE (ML) INTRAVENOUS ONCE
Qty: 0 | Refills: 0 | Status: COMPLETED | OUTPATIENT
Start: 2018-10-15 | End: 2018-10-15

## 2018-10-15 RX ORDER — INSULIN HUMAN 100 [IU]/ML
10 INJECTION, SOLUTION SUBCUTANEOUS ONCE
Qty: 0 | Refills: 0 | Status: COMPLETED | OUTPATIENT
Start: 2018-10-15 | End: 2018-10-15

## 2018-10-15 RX ORDER — ONDANSETRON 8 MG/1
4 TABLET, FILM COATED ORAL EVERY 6 HOURS
Qty: 0 | Refills: 0 | Status: DISCONTINUED | OUTPATIENT
Start: 2018-10-15 | End: 2018-10-15

## 2018-10-15 RX ORDER — ACETAMINOPHEN 500 MG
650 TABLET ORAL ONCE
Qty: 0 | Refills: 0 | Status: COMPLETED | OUTPATIENT
Start: 2018-10-15 | End: 2018-10-15

## 2018-10-15 RX ORDER — SODIUM BICARBONATE 1 MEQ/ML
0.22 SYRINGE (ML) INTRAVENOUS
Qty: 150 | Refills: 0 | Status: DISCONTINUED | OUTPATIENT
Start: 2018-10-15 | End: 2018-10-15

## 2018-10-15 RX ADMIN — Medication 100 MILLIGRAM(S): at 01:27

## 2018-10-15 RX ADMIN — Medication 200 GRAM(S): at 05:30

## 2018-10-15 RX ADMIN — Medication 100 MEQ/KG/HR: at 06:00

## 2018-10-15 RX ADMIN — Medication 50 MILLILITER(S): at 06:00

## 2018-10-15 RX ADMIN — INSULIN HUMAN 10 UNIT(S): 100 INJECTION, SOLUTION SUBCUTANEOUS at 06:00

## 2018-10-15 RX ADMIN — Medication 650 MILLIGRAM(S): at 02:20

## 2018-10-15 RX ADMIN — LACTULOSE 20 GRAM(S): 10 SOLUTION ORAL at 02:20

## 2018-10-15 RX ADMIN — Medication 3 MILLILITER(S): at 03:30

## 2018-10-15 RX ADMIN — ONDANSETRON 4 MILLIGRAM(S): 8 TABLET, FILM COATED ORAL at 01:27

## 2018-10-15 NOTE — PROGRESS NOTE ADULT - PROBLEM SELECTOR PROBLEM 3
MARIBEL (acute kidney injury)
Acute renal failure, unspecified acute renal failure type
MARIBEL (acute kidney injury)
MARIBEL (acute kidney injury)

## 2018-10-15 NOTE — PROGRESS NOTE ADULT - PROBLEM SELECTOR PROBLEM 1
Acute on chronic respiratory failure with hypoxia
MARIBEL (acute kidney injury)

## 2018-10-15 NOTE — DISCHARGE NOTE FOR THE EXPIRED PATIENT - HOSPITAL COURSE
83 year old male with PMH HTN, T2DM, COPD on home O2, CAD s/p CABG, chronic AF, s/p PPM s/p MVR on Coumadin, Chronic anemia, recent admission for GIB likely CKD3 presented with progressive dyspnea and fatigue and admitted for acute on chronic hypoxic respiratory failure secondary to acutely decompensated CHFpEF secondary to salt tablets in Nursing Home.  Mild leukocytosis, anemia and elevated SCr at admission BNP 89490, Tn 0.05, Chest X-Ray with congestive changes and EKG with AF.  Patient required NIPPV in ER and admitted to MICU, improved overnight with IV Lasix - now no longer on NIPPV. CT scan chest with pleural effusion and edema. Downgraded to medical service for further management.  WHile on Floor had worsening MARIBEL, Nephro toxic Agents were held,  Supratheraputic INR with bleeding, and borderline low BP's.  RRT was called for respiratory distress and found to have Shock and Hypoxia and profound lactic acidosis and worse ARF with hyperkalemia.  Pt was treated conservatively as patient had advanced directives in place after Medicine team discussed case with wife.   Patient  this am as a result of multi organ system failure.

## 2018-10-15 NOTE — PROGRESS NOTE ADULT - SUBJECTIVE AND OBJECTIVE BOX
Patient is a 83y old  Male who presents with a chief complaint of SOB (15 Oct 2018 08:35)      BRIEF HOSPITAL COURSE: 83 year old male PMHx Diastolic CHF, DM, HTN, Chronic Anemia, COPD (on Home O2), S/P MVR ( on Coumadin) CABG ( 20 years ago).  Recent hospitalization for CHF and hypoxia. Admitted 10/11/18 with increasing weakness and lethargy. recently DC from [facility]. His wife describes a state in over all decline, with poor PO intake/Appetite.  Dx with dCHF exacerbation and volume overload.  Initially admitted to ICU for NIV with Bipap and aggressive diarrhetics and was stepped out to medical floor after an overnight.  While on floor had worsening MARIBEL and diarrhetics were held along with other poss nephro toxic agents.  Renal  and Cardio followed patient.  daughters describe past 2 days with nausea and vomiting off and on.    Events last 24 hours: RRT tonight for Hypoxia, Increased WOB and was found to have Shock and profound metabolic Acidosis / Lactemia in setting of MARIBEL and acute on chronic severe diastolic heart failure     PAST MEDICAL & SURGICAL HISTORY:  COPD (chronic obstructive pulmonary disease)  CHF (congestive heart failure)  Chronic pain  Insomnia  Diabetes  Mitral valve replaced  Pacemaker  S/P MVR (mitral valve repair)  S/P CABG (coronary artery bypass graft)      Review of Systems: UATO      Medications:  cefTRIAXone   IVPB      cefTRIAXone   IVPB 1 Gram(s) IV Intermittent every 24 hours  norepinephrine Infusion 0.05 MICROgram(s)/kG/Min IV Continuous <Continuous>  ALBUTerol    90 MICROgram(s) HFA Inhaler 1 Puff(s) Inhalation every 4 hours  ALBUTerol/ipratropium for Nebulization 3 milliLiter(s) Nebulizer every 6 hours  buDESOnide   0.5 milliGRAM(s) Respule 0.5 milliGRAM(s) Inhalation two times a day  guaiFENesin    Syrup 100 milliGRAM(s) Oral every 6 hours PRN  tiotropium 18 MICROgram(s) Capsule 1 Capsule(s) Inhalation daily  mirtazapine 7.5 milliGRAM(s) Oral at bedtime  ondansetron    Tablet 4 milliGRAM(s) Oral every 6 hours PRN  ondansetron    Tablet 4 milliGRAM(s) Oral every 6 hours PRN  aspirin enteric coated 81 milliGRAM(s) Oral daily  pantoprazole    Tablet 40 milliGRAM(s) Oral before breakfast  atorvastatin 40 milliGRAM(s) Oral at bedtime  dextrose 40% Gel 15 Gram(s) Oral once PRN  dextrose 50% Injectable 12.5 Gram(s) IV Push once  dextrose 50% Injectable 25 Gram(s) IV Push once  dextrose 50% Injectable 25 Gram(s) IV Push once  glucagon  Injectable 1 milliGRAM(s) IntraMuscular once PRN  insulin lispro (HumaLOG) corrective regimen sliding scale   SubCutaneous three times a day before meals  calcium gluconate IVPB 1 Gram(s) IV Intermittent daily  dextrose 5%. 1000 milliLiter(s) IV Continuous <Continuous>  multivitamin/minerals 1 Tablet(s) Oral daily  sodium bicarbonate  Infusion 0.222 mEq/kG/Hr IV Continuous <Continuous>  sodium chloride 0.9%. 1000 milliLiter(s) IV Continuous <Continuous>            ICU Vital Signs Last 24 Hrs  T(C): 36.3 (15 Oct 2018 00:00), Max: 36.4 (14 Oct 2018 15:00)  T(F): 97.4 (15 Oct 2018 00:00), Max: 97.6 (14 Oct 2018 23:00)  HR: 31 (15 Oct 2018 07:55) (31 - 85)  BP: 78/40 (15 Oct 2018 07:15) (78/40 - 111/54)  BP(mean): 52 (15 Oct 2018 07:15) (45 - 77)  RR: 0 (15 Oct 2018 07:55) (0 - 29)  SpO2: 79% (15 Oct 2018 07:00) (79% - 100%)      ABG - ( 15 Oct 2018 03:56 )  pH, Arterial: 7.06  pH, Blood: x     /  pCO2: 25    /  pO2: 92    / HCO3: 9     / Base Excess: -21.6 /  SaO2: 92                  I&O's Detail    14 Oct 2018 07:01  -  15 Oct 2018 07:00  --------------------------------------------------------  IN:    Oral Fluid: 120 mL    sodium bicarbonate  Infusion: 200 mL    sodium chloride 0.9%.: 581 mL    Solution: 100 mL  Total IN: 1001 mL    OUT:    Incontinent per Condom Catheter: 20 mL  Total OUT: 20 mL    Total NET: 981 mL            LABS:                        8.4    15.0  )-----------( 86       ( 15 Oct 2018 04:48 )             27.1     10-15    137  |  95<L>  |  88.0<H>  ----------------------------<  151<H>  6.3<HH>   |  10.0<LL>  |  3.75<H>    Ca    6.8<L>      15 Oct 2018 04:48    TPro  6.0<L>  /  Alb  2.5<L>  /  TBili  4.2<H>  /  DBili  x   /  AST  1057<H>  /  ALT  633<H>  /  AlkPhos  114  10-15          CAPILLARY BLOOD GLUCOSE      POCT Blood Glucose.: 93 mg/dL (15 Oct 2018 04:00)    PT/INR - ( 15 Oct 2018 04:48 )   PT: 183.5 sec;   INR: >15.00 ratio         PTT - ( 15 Oct 2018 04:48 )  PTT:59.6 sec  Urinalysis Basic - ( 13 Oct 2018 21:06 )    Color: Yellow / Appearance: Clear / S.025 / pH: x  Gluc: x / Ketone: Trace  / Bili: Small / Urobili: 1 mg/dL   Blood: x / Protein: 100 mg/dL / Nitrite: Negative   Leuk Esterase: Moderate / RBC: 3-5 /HPF / WBC 0-2   Sq Epi: x / Non Sq Epi: Occasional / Bacteria: Few      CULTURES:          Physical Examination:    General:  Awake. Confused at times, interactive, nonfocal, Cachectic appearing - Tolerating NIV with Bipap     HEENT: Pupils equal, reactive to light.  Symmetric. No JVD.    PULM: Rhonchi / Rales bilaterally, no significant sputum production    CVS: Regular rate and rhythm, no murmurs, rubs, or gallops    ABD: Soft, nondistended, nontender, normoactive bowel sounds, no masses    EXT: No edema, nontender    SKIN: cool mottled LE with Acrocyanosis to Feet / hands , no rashes noted.        RADIOLOGY:   < from: Xray Chest 1 View- PORTABLE-Urgent (10.15.18 @ 05:23) >     EXAM:  XR CHEST PORTABLE URGENT 1V                          PROCEDURE DATE:  10/15/2018          INTERPRETATION:  CHEST AP PORTABLE:    History: SOB.     Date and time of exam: 10/15/2018 4:03 AM.    Technique: A single AP view of the chest was obtained.    Comparison exam: 10/11/2018.    Findings:  Persistent right lung infiltrate, unchanged. Less pronounced left lung   infiltrate, unchanged. No evidence of pneumothorax. No evidence of   pleural effusion..    < end of copied text >      CRITICAL CARE TIME SPENT: ***  (Assessing presenting problems of acute illness, which pose high probability of life threatening deterioration or end organ damage/dysfunction, as well as medical decision making including initiating plan of care, reviewing data, reviewing radiologic exams, discussing with multidisciplinary team,  discussing goals of care with patient/family, and writing this note.  Non-inclusive of procedures performed)

## 2018-10-15 NOTE — CHART NOTE - NSCHARTNOTEFT_GEN_A_CORE
RRT called for hypoxia, tachypnea and pt found to be hypotensive (SBP 50-60s on manual cuff)- Full resident note to follow.  Called and spoke with Ivette Villaseñor about advanced directives.  Per wife, pt would not want mechanical intubation if worsening respiratory failure or CPR in event of cardiac arrest, but she would want IV pressors for hypotension.  Wife advised to come to hospital as pt is critically ill. RRT called for worsening mental status, hypoxia, tachypnea and pt found to be persistently hypotensive (SBP 50-70s on manual cuff)- Full resident note with full details to follow.  Called and spoke with Ivette Villaseñor about advanced directives.  Per wife, pt would not want mechanical intubation if worsening respiratory failure or CPR in event of cardiac arrest, but she would want IV pressors for hypotension.  Wife advised to come to hospital as pt is critically ill. Came to bedside for RRT called for worsening mental status, hypoxia, tachypnea and pt found to be persistently hypotensive (SBP 50-70s on manual cuff) with labs showing significant acidemic (pH 7.06 and lactate of 14)- Full resident note with full details to follow.  Called and spoke with Ivette Villaseñor about advanced directives.  Per wife, pt would not want mechanical intubation if worsening respiratory failure or CPR in event of cardiac arrest, but she would want IV pressors for hypotension.  Wife advised to come to hospital as pt is critically ill.  Called MICU PA who is at bedside.  Pt to be transferred to MICU for further care on levophed gtt.

## 2018-10-15 NOTE — PROGRESS NOTE ADULT - PROBLEM SELECTOR PROBLEM 7
Metabolic acidosis
Protein calorie malnutrition

## 2018-10-15 NOTE — CHART NOTE - NSCHARTNOTEFT_GEN_A_CORE
Critical Care ELIAS Ceron    Called to pronounce Tony Matthews dead.  Patient is unresponsive to verbal and tactile stimuli.  Patient is not breathing.    No air entry heard.  No pulses felt.  No heart sounds heard.  Pupils are fixed and dilated bilaterally.  Patient pronounced dead at 0755.  Daughters were at bedside at time of pronouncement.    Elvis Ceron PAC

## 2018-10-15 NOTE — CHART NOTE - NSCHARTNOTEFT_GEN_A_CORE
Rapid Response Note    83 year old male with PMH HTN, T2DM, COPD on home O2, CAD s/p CABG, chronic AF, s/p PPM s/p MVR on Coumadin, Chronic anemia, recent admission for GIB likely CKD3 presented with progressive dyspnea and fatigue and admitted for acute on chronic hypoxic respiratory failure secondary to acutely decompensated CHFpEF.     Rapid response was called for hypoxia (SpO2 84% on 100% non-rebreather). Patient seen and examined at bedside by rapid response team. Patient noted to have skin pallor and cool extremities with signs of peripheral cyanosis. Acutely dyspneic with neck muscle retractions. Alert and responding to questions, but mentation slightly altered.     Physical Exam:  Gen: acutely dyspneic with increased work of breathing  Resp: neck retractions, fair air entry b/l, coarse crackles in lower lung fields  Cardio: RRR, no murmurs  Abdomen: soft, moderately distended, diffusely tender to palpation, no rigidity  Extremities: peripheral cyanosis with bluish discoloration of fingers and toes, cool extremities  Vascular: thready radial and dorsalis pulses  Skin: pale  Neuro: alert and responding to questions, slightly confused    Labs:                        10.1   15.0  )-----------( 140      ( 14 Oct 2018 07:19 )             30.2   10-14    134<L>  |  95<L>  |  76.0<H>  ----------------------------<  184<H>  5.3   |  18.0<L>  |  2.91<H>    Ca    7.5<L>      14 Oct 2018 07:19    Prothrombin Time and INR, Plasma in AM (10.14.18 @ 07:19)    Prothrombin Time, Plasma: 73.1 sec    INR: 6.40    Blood Gas Arterial, Lactate: 13.9 (10.15.18 @ 03:56)    ABG - ( 15 Oct 2018 03:56 )  pH, Arterial: 7.06  pH, Blood: x     /  pCO2: 25    /  pO2: 92    / HCO3: 9     / Base Excess: -21.6 /  SaO2: 92        Plan:  Pt was placed on bipap, and with saturation in mid-high 80s. ICU PA called and arrived at bedside to evaluate case. STAT abg and labs drawn. ABG showing metabolic acidosis (pH 7.03) with respiratory compensation. Profound lactemia of 14. Pt given bicarb x 2 and 1 amp of dextrose for low glucose of 49. Also noted to have palpable blood pressure, unable to get accurate reading despite multiple attempts with manual cuff.  Patient given IV fluid bolus x 2. Levophed started at bedside. Patient's family was contacted by Attending (Dr Lemos) and DNR/DNI status was confirmed. Patient to be transferred to MICU for further monitoring and management. Rapid Response Note    83 year old male with PMH HTN, T2DM, COPD on home O2, CAD s/p CABG, chronic AF, s/p PPM s/p MVR on Coumadin, Chronic anemia, recent admission for GIB likely CKD3 presented with progressive dyspnea and fatigue and admitted for acute on chronic hypoxic respiratory failure secondary to acutely decompensated CHFpEF.     Rapid response was called for hypoxia (SpO2 84% on 100% non-rebreather). Patient seen and examined at bedside by rapid response team. Patient noted to have skin pallor and cool extremities with signs of peripheral cyanosis. Acutely dyspneic with neck muscle retractions. Alert and responding to questions, but mentation slightly altered.     Physical Exam:  Gen: acutely dyspneic with increased work of breathing  Resp: neck retractions, fair air entry b/l, coarse crackles in lower lung fields  Cardio: RRR, no murmurs  Abdomen: soft, moderately distended, diffusely tender to palpation, no rigidity  Extremities: peripheral cyanosis with bluish discoloration of fingers and toes, cool extremities  Vascular: thready radial and dorsalis pulses  Skin: pale  Neuro: alert and responding to questions, slightly confused    Labs:                        10.1   15.0  )-----------( 140      ( 14 Oct 2018 07:19 )             30.2   10-14    134<L>  |  95<L>  |  76.0<H>  ----------------------------<  184<H>  5.3   |  18.0<L>  |  2.91<H>    Ca    7.5<L>      14 Oct 2018 07:19    Prothrombin Time and INR, Plasma in AM (10.14.18 @ 07:19)    Prothrombin Time, Plasma: 73.1 sec    INR: 6.40    Blood Gas Arterial, Lactate: 13.9 (10.15.18 @ 03:56)    ABG - ( 15 Oct 2018 03:56 )  pH, Arterial: 7.06  pH, Blood: x     /  pCO2: 25    /  pO2: 92    / HCO3: 9     / Base Excess: -21.6 /  SaO2: 92        Plan:  Pt was placed on bipap, and with saturation in mid-high 80s. ICU PA called and arrived at bedside to evaluate case. STAT abg and labs drawn. ABG showing metabolic acidosis (pH 7.03) with respiratory compensation. Profound lactemia of 14. Pt given bicarb x 2 and 1 amp of dextrose for low glucose of 49. Also noted to have palpable blood pressure, unable to get accurate reading despite multiple attempts with manual cuff.  Patient given IV fluid bolus x 2. Levophed started at bedside. Patient's family was contacted by Attending (Dr Lemos) and DNR/DNI status was confirmed. Patient to be transferred to MICU for further monitoring and management.    Attending addendum:  Agree with above, briefly came to bedside to assist residents for RRT called for hypoxia, respiratory distress.  On arrival, pt found to be hypotensive (SBP 50-70s on manual), tachypneic with bilateral crackles and with cool extremities concerning for cardiogenic shock as pt was recently in MICU for acute decompensated failure.  Labs remarkable for worsening acidemia and renal failure and significantly elevated lactate of 14.  Spoke with wife who confirmed pt DNR/DNI as above (see separate chart note for documentation of conversation), but wanted IV pressors for hypotension.  Pt placed on BiPAP and levophed gtt while on floor. Pt transferred to MICU for shock state requiring pressor support. Rapid Response Note    83 year old male with PMH HTN, T2DM, COPD on home O2, CAD s/p CABG, chronic AF, s/p PPM s/p MVR on Coumadin, Chronic anemia, recent admission for GIB likely CKD3 presented with progressive dyspnea and fatigue and admitted for acute on chronic hypoxic respiratory failure secondary to acutely decompensated CHFpEF.     Rapid response was called for hypoxia (SpO2 84% on 100% non-rebreather). Patient seen and examined at bedside by rapid response team. Patient noted to have skin pallor and cool extremities with signs of peripheral cyanosis. Acutely dyspneic with neck muscle retractions. Alert and responding to questions, but mentation slightly altered.     Physical Exam:  Gen: acutely dyspneic with increased work of breathing  Resp: neck retractions, fair air entry b/l, coarse crackles in lower lung fields  Cardio: RRR, no murmurs  Abdomen: soft, moderately distended, diffusely tender to palpation, no rigidity  Extremities: peripheral cyanosis with bluish discoloration of fingers and toes, cool extremities  Vascular: thready radial and dorsalis pulses  Skin: pale  Neuro: alert and responding to questions, slightly confused    Labs:                        10.1   15.0  )-----------( 140      ( 14 Oct 2018 07:19 )             30.2   10-14    134<L>  |  95<L>  |  76.0<H>  ----------------------------<  184<H>  5.3   |  18.0<L>  |  2.91<H>    Ca    7.5<L>      14 Oct 2018 07:19    Prothrombin Time and INR, Plasma in AM (10.14.18 @ 07:19)    Prothrombin Time, Plasma: 73.1 sec    INR: 6.40    Blood Gas Arterial, Lactate: 13.9 (10.15.18 @ 03:56)    ABG - ( 15 Oct 2018 03:56 )  pH, Arterial: 7.06  pH, Blood: x     /  pCO2: 25    /  pO2: 92    / HCO3: 9     / Base Excess: -21.6 /  SaO2: 92        Plan:  Pt was placed on bipap, and with saturation in mid-high 80s. ICU PA called and arrived at bedside to evaluate case. STAT abg and labs drawn. ABG showing metabolic acidosis (pH 7.03) with respiratory compensation. Profound lactemia of 14. Pt given bicarb x 2 and 1 amp of dextrose for low glucose of 49. Also noted to have palpable blood pressure, unable to get accurate reading despite multiple attempts with manual cuff.  Patient given IV fluid bolus x 2. Levophed started at bedside. Patient's family was contacted by Attending (Dr Lemos) and DNR/DNI status was confirmed. Patient to be transferred to MICU for further monitoring and management.    Attending addendum:  Agree with above, briefly came to bedside to assist residents for RRT called for hypoxia, respiratory distress.  On arrival, pt found to be hypotensive (SBP 50-70s on manual), tachypneic with bilateral crackles and with cool extremities concerning for cardiogenic shock as pt was recently in MICU for acute decompensated failure.  Labs remarkable for worsening acidemia and renal failure and significantly elevated lactate of 14.  Spoke with wife who confirmed pt DNR/DNI as above (see separate chart note for documentation of conversation), but wanted IV pressors for hypotension.   MICU PA called who came to bedside.  Pt placed on BiPAP and levophed gtt while on floor. Pt transferred to MICU for shock state requiring pressor support.

## 2018-10-15 NOTE — PROGRESS NOTE ADULT - PROBLEM SELECTOR PROBLEM 6
Pulmonary edema cardiac cause
Type 2 diabetes mellitus with stage 3 chronic kidney disease, unspecified whether long term insulin use

## 2018-10-15 NOTE — PROGRESS NOTE ADULT - PROBLEM SELECTOR PROBLEM 2
Acute on chronic diastolic (congestive) heart failure

## 2018-10-15 NOTE — PROGRESS NOTE ADULT - ASSESSMENT
83 year old male PMHx Diastolic CHF, DM, HTN, Chronic Anemia, COPD (on Home O2), S/P MVR ( on Coumadin) CABG ( 20 years ago).  Recent hospitalization for CHF and hypoxia. Admitted 10/11/18 with increasing weakness and lethargy. recently DC from [facility]. His wife describes a state in over all decline, with poor PO intake/Appetite.  Dx with dCHF exacerbation and volume overload.    RRT tonight for Hypoxia, Increased WOB and was found to have Shock and profound metabolic Acidosis / Lactemia in setting of MARIBEL and acute on chronic severe diastolic heart failure.    NEURO - Confused but close to BS per daughters     PULM - Bipap for WOB / Hypoxia     CV - Likely decompensated diastolic HF / Cardiogenic Shock          Levophed for BP support          CVP line deferred as Coagulopathic from AC for MVR          No role for inotrope with dHF     GI - NPO / PPI - Shock liver noted          1Lrt NS given during RRT will hold further IVF given Respiratory status and DNI     RENAL - Bicarb Push and Bicarb Gtt                Insulin / Dextrose / calcium for Hyperkalemia               may require HD daughters are amendable at this time                Appears Cardio Renal                  ID has been on Rocephin but do not think this is sepsis    DVT - AC on HOLD - may need reversal with FFP or PCC if Lines are needed    Discussed with Daughters severity of the nights findings and that the patient may  this am.  They understand and wish to continue aggressive measures up to DNR / DNI at this time.

## 2018-10-15 NOTE — RESPIRATORY CARE EMERGENCY NOTE - CAC RESPIRATORY COMMENTS
Pt found in bed SOB, labored breathing, retractions noted, nailbeds blue, saturating 84% on 5L N/C. Rapid reposne called. stat ABG done, pt placed on BIPAP 14/7 @ 100% and tranpsorted to CTICU.

## 2018-10-15 NOTE — PROGRESS NOTE ADULT - PROBLEM SELECTOR PROBLEM 5
Acute respiratory failure, unspecified whether with hypoxia or hypercapnia
Chronic atrial fibrillation

## 2018-10-17 LAB
CULTURE RESULTS: SIGNIFICANT CHANGE UP
CULTURE RESULTS: SIGNIFICANT CHANGE UP
ORGANISM # SPEC MICROSCOPIC CNT: SIGNIFICANT CHANGE UP
ORGANISM # SPEC MICROSCOPIC CNT: SIGNIFICANT CHANGE UP
SPECIMEN SOURCE: SIGNIFICANT CHANGE UP
SPECIMEN SOURCE: SIGNIFICANT CHANGE UP

## 2018-11-11 PROCEDURE — 83605 ASSAY OF LACTIC ACID: CPT

## 2018-11-11 PROCEDURE — 85730 THROMBOPLASTIN TIME PARTIAL: CPT

## 2018-11-11 PROCEDURE — 82435 ASSAY OF BLOOD CHLORIDE: CPT

## 2018-11-11 PROCEDURE — 85027 COMPLETE CBC AUTOMATED: CPT

## 2018-11-11 PROCEDURE — 94660 CPAP INITIATION&MGMT: CPT

## 2018-11-11 PROCEDURE — 81001 URINALYSIS AUTO W/SCOPE: CPT

## 2018-11-11 PROCEDURE — 93005 ELECTROCARDIOGRAM TRACING: CPT

## 2018-11-11 PROCEDURE — 83880 ASSAY OF NATRIURETIC PEPTIDE: CPT

## 2018-11-11 PROCEDURE — 99291 CRITICAL CARE FIRST HOUR: CPT | Mod: 25

## 2018-11-11 PROCEDURE — 83550 IRON BINDING TEST: CPT

## 2018-11-11 PROCEDURE — 84466 ASSAY OF TRANSFERRIN: CPT

## 2018-11-11 PROCEDURE — 83735 ASSAY OF MAGNESIUM: CPT

## 2018-11-11 PROCEDURE — 80053 COMPREHEN METABOLIC PANEL: CPT

## 2018-11-11 PROCEDURE — 76770 US EXAM ABDO BACK WALL COMP: CPT

## 2018-11-11 PROCEDURE — 80048 BASIC METABOLIC PNL TOTAL CA: CPT

## 2018-11-11 PROCEDURE — 84484 ASSAY OF TROPONIN QUANT: CPT

## 2018-11-11 PROCEDURE — 84100 ASSAY OF PHOSPHORUS: CPT

## 2018-11-11 PROCEDURE — 86901 BLOOD TYPING SEROLOGIC RH(D): CPT

## 2018-11-11 PROCEDURE — 87186 SC STD MICRODIL/AGAR DIL: CPT

## 2018-11-11 PROCEDURE — 85014 HEMATOCRIT: CPT

## 2018-11-11 PROCEDURE — 82803 BLOOD GASES ANY COMBINATION: CPT

## 2018-11-11 PROCEDURE — 94640 AIRWAY INHALATION TREATMENT: CPT

## 2018-11-11 PROCEDURE — 71045 X-RAY EXAM CHEST 1 VIEW: CPT

## 2018-11-11 PROCEDURE — 82330 ASSAY OF CALCIUM: CPT

## 2018-11-11 PROCEDURE — 84295 ASSAY OF SERUM SODIUM: CPT

## 2018-11-11 PROCEDURE — 87150 DNA/RNA AMPLIFIED PROBE: CPT

## 2018-11-11 PROCEDURE — 82962 GLUCOSE BLOOD TEST: CPT

## 2018-11-11 PROCEDURE — 87086 URINE CULTURE/COLONY COUNT: CPT

## 2018-11-11 PROCEDURE — 82550 ASSAY OF CK (CPK): CPT

## 2018-11-11 PROCEDURE — 36600 WITHDRAWAL OF ARTERIAL BLOOD: CPT

## 2018-11-11 PROCEDURE — 83036 HEMOGLOBIN GLYCOSYLATED A1C: CPT

## 2018-11-11 PROCEDURE — 82947 ASSAY GLUCOSE BLOOD QUANT: CPT

## 2018-11-11 PROCEDURE — 86850 RBC ANTIBODY SCREEN: CPT

## 2018-11-11 PROCEDURE — 71250 CT THORAX DX C-: CPT

## 2018-11-11 PROCEDURE — 84132 ASSAY OF SERUM POTASSIUM: CPT

## 2018-11-11 PROCEDURE — P9047: CPT

## 2018-11-11 PROCEDURE — 86900 BLOOD TYPING SEROLOGIC ABO: CPT

## 2018-11-11 PROCEDURE — 87040 BLOOD CULTURE FOR BACTERIA: CPT

## 2018-11-11 PROCEDURE — 36415 COLL VENOUS BLD VENIPUNCTURE: CPT

## 2018-11-11 PROCEDURE — 82728 ASSAY OF FERRITIN: CPT

## 2018-11-11 PROCEDURE — 96374 THER/PROPH/DIAG INJ IV PUSH: CPT

## 2018-11-11 PROCEDURE — 85610 PROTHROMBIN TIME: CPT

## 2018-11-11 PROCEDURE — 94760 N-INVAS EAR/PLS OXIMETRY 1: CPT
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
## 2020-08-13 NOTE — ED PROVIDER NOTE - PROGRESS NOTE
· Hemoglobin A1c 6 2  · Educated on weight loss/dietary modification  · Not currently on any medication Stable.

## 2020-10-13 NOTE — PHYSICAL THERAPY INITIAL EVALUATION ADULT - ASSISTIVE DEVICE, REHAB EVAL
HR=66 bpm, NPQB=559/65 mmhg, SpO2=99 %, Resp=15 B/min, EtCO2=32 mmHg, Pain=0, Дмитрий=10, Puente=1 bed rails

## 2021-04-12 NOTE — DIETITIAN INITIAL EVALUATION ADULT. - PROBLEM/PLAN-1
Daily Note     Today's date: 2021  Patient name: Huang Majano  : 1938  MRN: [id number]  Referring provider: Iam White DO  Dx:   Encounter Diagnosis     ICD-10-CM    1  Lumbar radiculopathy, chronic  M54 16    2  Left foot drop  M21 372                   Subjective: Pt reports that he has been consistent with exercises at home with no complaints  Objective: See treatment diary below  Tandem Stance: 5" R foot forward; 8" L foot forward  SLS: unable to maintain 3" B  Assessment: Pt tolerates exercises well with no complaints  Cues provided to maximize exercises especially with core based exercises  Pt will be monitored and benefit from continued skilled PT  Plan: Continue per plan of care  Precautions: lumbar fusion, hx laminectomy, on warfarin  Manuals            TCJ AP joint mob  DL Gr II-III L           Laser peroneal nn  L  5'                                      Neuro Re-Ed                                                                                                        Ther Ex             3 way ankle DF, INV/EV HEP demo (grn) Grn 20x each direction  HR/TR standing HEP demo 30x ea              Bridge             PPT HEP 5"x30           Isometric abdominal crunch Cue to not hold breath NV 15x 5"; cue to breathe           Hip Adduction sq    20x 5"           Calf stretch  NV                        Ther Activity             Bike   8'           SLS  attempted Difficulty maintaining           Tandem Stance  2x30" each           Marching with Ankle DF   Red @ midfoot 2x10 alternating           Gait Training                                       Modalities
DISPLAY PLAN FREE TEXT

## 2021-04-13 NOTE — INPATIENT CERTIFICATION FOR MEDICARE PATIENTS - THE SEVERITY OF SIGNS/SYMPTOMS. (SEE ED/ADMIT DOCUMENTS)
1. The severity of signs/symptoms.(See ED/admit documents) Implemented All Universal Safety Interventions:  [place] to call system. Call bell, personal items and telephone within reach. Instruct patient to call for assistance. Room bathroom lighting operational. Non-slip footwear when patient is off stretcher. Physically safe environment: no spills, clutter or unnecessary equipment. Stretcher in lowest position, wheels locked, appropriate side rails in place.

## 2021-05-10 NOTE — ED PROVIDER NOTE - CPE EDP CARDIAC NORM
normal... Oxybutynin Counseling:  I discussed with the patient the risks of oxybutynin including but not limited to skin rash, drowsiness, dry mouth, difficulty urinating, and blurred vision.

## 2021-06-12 NOTE — ED ADULT NURSE NOTE - CAS TRG GEN SKIN COLOR
[Dear  ___] : Dear  [unfilled], [Courtesy Letter:] : I had the pleasure of seeing your patient, [unfilled], in my office today. [Please see my note below.] : Please see my note below. [Sincerely,] : Sincerely, [FreeTextEntry3] : Marques Max MD, FACP, AGAF, FAASLD\par  of Medicine\par [facility] [employer] [facility]\par  Normal for race

## 2021-12-09 NOTE — PROGRESS NOTE ADULT - PROBLEM SELECTOR PROBLEM 5
Ongoing SW/CM Assessment/Plan of Care Note     See SW/CM flowsheets for goals and other objective data. Patient/Family discharge goal (s):   hoem w/ sps     PT Recommendation:     Recommendation for Discharge: PT IL: Patient requires  intermittent assistance to perform mobility and/or ADLs safely,Patient is appropriate for Physical Therapy 1-3 times per week    OT Recommendation:     Recommendations for Discharge: OT IL: Patient does not require assistance to perform mobility and/or ADLs safely        Disposition:   home w/ sps    Progress note:   Chart reviewed. Case discussed in AM Transition rounds w/ primary RN and Charge RN. Met w/ pt to discuss POC and d/c goals. Plan: home w/ sps, offered HH but declined, assigned RN aware, if pt tolerates diet, anticipated dc today. Sps will provide traspo upon dc.        Flor Galvez BSN, RN-C/CM  Care Management Department  Office:  [phone] (internal:  )  Pager:  [phone]  Fax:  [phone] Diabetes

## 2022-06-14 NOTE — PATIENT PROFILE ADULT. - NSTOBACCO TYPE_GEN_A_CORE_RD
Cigarettes No Repair - Repaired With Adjacent Surgical Defect Text (Leave Blank If You Do Not Want): After obtaining clear surgical margins the defect was repaired concurrently with another surgical defect which was in close approximation.

## 2022-09-27 NOTE — H&P ADULT - PROBLEM SELECTOR PLAN 1
Check iron studies   Transfuse PRBC   Cardiac monitoring  Monitor H/H  Check stool for occult blood Xray Chest 2 Views PA/Lat

## 2022-12-15 NOTE — PROGRESS NOTE ADULT - SUBJECTIVE AND OBJECTIVE BOX
I left voice message for the patient to call. RX sent   INTERVAL HISTORY: Feels well, denies CP,SOB.  	  MEDICATIONS:  furosemide   Injectable 40 milliGRAM(s) IV Push daily  ampicillin  IVPB 2 Gram(s) IV Intermittent once  gentamicin   IVPB 80 milliGRAM(s) IV Intermittent once  ALBUTerol/ipratropium for Nebulization 3 milliLiter(s) Nebulizer every 6 hours  acetaminophen   Tablet. 650 milliGRAM(s) Oral every 6 hours PRN  mirtazapine 7.5 milliGRAM(s) Oral at bedtime  ondansetron Injectable 4 milliGRAM(s) IV Push every 6 hours PRN  pregabalin 75 milliGRAM(s) Oral three times a day  bisacodyl 5 milliGRAM(s) Oral at bedtime  docusate sodium 100 milliGRAM(s) Oral three times a day  pantoprazole  Injectable 40 milliGRAM(s) IV Push daily  allopurinol 150 milliGRAM(s) Oral daily  atorvastatin 40 milliGRAM(s) Oral at bedtime  dextrose 40% Gel 15 Gram(s) Oral once PRN  dextrose 50% Injectable 12.5 Gram(s) IV Push once  dextrose 50% Injectable 25 Gram(s) IV Push once  dextrose 50% Injectable 25 Gram(s) IV Push once  glucagon  Injectable 1 milliGRAM(s) IntraMuscular once PRN  insulin lispro (HumaLOG) corrective regimen sliding scale   SubCutaneous three times a day before meals  benzocaine 15 mG/menthol 3.6 mG Lozenge 1 Lozenge Oral two times a day PRN  dextrose 5%. 1000 milliLiter(s) IV Continuous <Continuous>  nystatin Powder 1 Application(s) Topical two times a day  sodium chloride 0.9% lock flush 3 milliLiter(s) IV Push every 8 hours        PHYSICAL EXAM:  T(C): 36.4 (08-23-18 @ 05:16), Max: 36.7 (08-22-18 @ 11:35)  HR: 61 (08-23-18 @ 05:16) (60 - 71)  BP: 125/48 (08-23-18 @ 05:16) (114/55 - 125/48)  RR: 18 (08-23-18 @ 05:16) (18 - 18)  SpO2: 96% (08-23-18 @ 05:16) (95% - 98%)  Wt(kg): --  I&O's Summary        Appearance: Normal	  HEENT:   Normal oral mucosa  Cardiovascular: Normal S1 S2, No JVD, No murmurs, No edema  Respiratory: Lungs clear to auscultation	  Psychiatry: A & O x 3, Mood & affect appropriate  Gastrointestinal:  Soft, Non-tender, + BS	  Skin: No rashes, No ecchymoses, No cyanosis  Neurologic: Non-focal  Extremities: Normal range of motion, No clubbing, cyanosis or edema  Vascular: Peripheral pulses palpable 2+ bilaterally    TELEMETRY: Paced, underlying afib	        LABS:	 	                          8.5    6.7   )-----------( 164      ( 23 Aug 2018 07:29 )             26.8     08-23    141  |  98  |  20.0  ----------------------------<  112  3.9   |  31.0<H>  |  1.19    Ca    7.9<L>      23 Aug 2018 07:29  Mg     2.0     08-22      ASSESSMENT/PLAN: 	DAVID LUIS is a 83y man with significant history of HTN, DMII, HLD, CAD s/p remote CABG, mechanical MVR (2012), SSS s/p PPM (2009), moderate b/l carotid disease, and recent acute blood loss anemia with recent admission (GIB), sent to the ER for evaluation for anemia.    The patient has been chest pain free since admission.  Mildly elevated flat-trending troponin is nonspecific and moreover unchanged from recent admission; doubt acute MI.           Echocardiogram from 08/20/2018 reviewed as above.    No evidence of ischemia or CHF clinically, eventual ischemic evaluation (likely as outpatient).  The patient received a dose of lasix earlier with significant clinical and symptomatic  improvement.  Would give additional Lasix If additional transfusion required.     Acute anemia (?blood loss) with recent GIB The patient is s/p mechanical mitral valve replacement, maintained on coumadin with goal INR of 2.5-3.5.  This should be continued.  GI following regarding BRBPR.  There is no cardiac contraindication to proceeding with colonoscopy which may be performed as scheduled.

## 2024-10-09 NOTE — ED ADULT NURSE REASSESSMENT NOTE - NS ED NURSE REASSESS COMMENT FT1
[facility] - [address]    pt started on 1st unit of PRBC's . diego it well. unit # [id number] A POS. no signs and symptoms of reaction noted. v.s.s.

## 2024-10-21 NOTE — ED ADULT NURSE NOTE - HARM RISK FACTORS
Megan Deng  : 1974  Primary: Cristiana Dennis (Commercial)  Secondary:  [facility] @ [address]  [facility] [address]  Phone: [phone]  Fax: [phone] Plan Frequency: 2 times per week for 90 days  Plan of Care/Certification Expiration Date: 25        Plan of Care/Certification Expiration Date:  Plan of Care/Certification Expiration Date: 25    Frequency/Duration: Plan Frequency: 2 times per week for 90 days      Time In/Out:   Time In: 1645  Time Out: 1735      PT Visit Info:    Progress Note Due Date: 24  Total # of Visits to Date: 2  Progress Note Counter: 2      Visit Count:  2    OUTPATIENT PHYSICAL THERAPY:   Treatment Note 10/21/2024       Episode  (PT: R hip pain)               Treatment Diagnosis:    Pain in right hip  Medical/Referring Diagnosis:    Right hip pain [M25.551]    Referring Physician:  Teodoro Ace APRN - CNP MD Orders:  PT Eval and Treat   Return MD Appt:  TBD   Date of Onset:  Onset Date:  (Chronic)  Allergies:   Meloxicam  Restrictions/Precautions:   None      Interventions Planned (Treatment may consist of any combination of the following):     See Assessment Note    Subjective Comments:   Patient states her hip pain comes and goes depending on what she is doing.  She states she would like to avoid BEBETO surgery.  Initial Pain Level::     2/10  Post Session Pain Level:       2/10  Medications Last Reviewed:  10/21/2024  Updated Objective Findings:  None Today  Treatment   THERAPEUTIC EXERCISE: (40 minutes):    Exercises per grid below to improve mobility and strength.  Required minimal visual, verbal, and manual cues to promote proper body alignment, promote proper body posture, and promote proper body mechanics.  Progressed resistance, range, repetitions, and complexity of movement as indicated.   Date:  10/9/2024 Date:  10-21-24   Activity/Exercise Parameters    Modified Richi stretch off edge of bed 10 
no